# Patient Record
Sex: FEMALE | Race: AMERICAN INDIAN OR ALASKA NATIVE
[De-identification: names, ages, dates, MRNs, and addresses within clinical notes are randomized per-mention and may not be internally consistent; named-entity substitution may affect disease eponyms.]

---

## 2017-09-17 ENCOUNTER — HOSPITAL ENCOUNTER (INPATIENT)
Dept: HOSPITAL 42 - ED | Age: 37
LOS: 8 days | Discharge: HOME | DRG: 430 | End: 2017-09-25
Attending: PSYCHIATRY & NEUROLOGY | Admitting: PSYCHIATRY & NEUROLOGY
Payer: MEDICAID

## 2017-09-17 VITALS — BODY MASS INDEX: 52.9 KG/M2

## 2017-09-17 DIAGNOSIS — E66.01: ICD-10-CM

## 2017-09-17 DIAGNOSIS — I11.9: ICD-10-CM

## 2017-09-17 DIAGNOSIS — D72.820: ICD-10-CM

## 2017-09-17 DIAGNOSIS — J06.9: ICD-10-CM

## 2017-09-17 DIAGNOSIS — R82.71: ICD-10-CM

## 2017-09-17 DIAGNOSIS — I87.8: ICD-10-CM

## 2017-09-17 DIAGNOSIS — Z91.19: ICD-10-CM

## 2017-09-17 DIAGNOSIS — D64.9: ICD-10-CM

## 2017-09-17 DIAGNOSIS — F16.10: ICD-10-CM

## 2017-09-17 DIAGNOSIS — E55.9: ICD-10-CM

## 2017-09-17 DIAGNOSIS — J30.9: ICD-10-CM

## 2017-09-17 DIAGNOSIS — G47.00: ICD-10-CM

## 2017-09-17 DIAGNOSIS — J45.909: ICD-10-CM

## 2017-09-17 DIAGNOSIS — F17.200: ICD-10-CM

## 2017-09-17 DIAGNOSIS — F20.9: Primary | ICD-10-CM

## 2017-09-17 DIAGNOSIS — F31.9: ICD-10-CM

## 2017-09-17 DIAGNOSIS — F41.9: ICD-10-CM

## 2017-09-17 LAB
APPEARANCE UR: (no result)
BACTERIA #/AREA URNS HPF: (no result) /[HPF]
BILIRUB UR-MCNC: (no result) MG/DL
COLOR UR: YELLOW
GLUCOSE UR STRIP-MCNC: NEGATIVE MG/DL
KETONES UR STRIP-MCNC: (no result) MG/DL
LEUKOCYTE ESTERASE UR-ACNC: NEGATIVE LEU/UL
PH UR STRIP: 6 [PH] (ref 4.7–8)
PROT UR STRIP-MCNC: 30 MG/DL
RBC # UR STRIP: NEGATIVE /UL
RBC #/AREA URNS HPF: NEGATIVE /HPF (ref 0–2)
SP GR UR STRIP: >= 1.03 (ref 1–1.03)
UROBILINOGEN UR STRIP-ACNC: 1 E.U./DL

## 2017-09-18 LAB
ALBUMIN/GLOB SERPL: 1.1 {RATIO} (ref 1.1–1.8)
ALBUMIN/GLOB SERPL: 1.1 {RATIO} (ref 1.1–1.8)
ALP SERPL-CCNC: 81 U/L (ref 38–126)
ALP SERPL-CCNC: 94 U/L (ref 38–126)
ALT SERPL-CCNC: 23 U/L (ref 7–56)
ALT SERPL-CCNC: 31 U/L (ref 7–56)
AST SERPL-CCNC: 24 U/L (ref 14–36)
AST SERPL-CCNC: 27 U/L (ref 14–36)
BASOPHILS # BLD AUTO: 0.03 K/MM3 (ref 0–2)
BASOPHILS # BLD AUTO: 0.03 K/MM3 (ref 0–2)
BASOPHILS NFR BLD: 0.5 % (ref 0–3)
BASOPHILS NFR BLD: 0.5 % (ref 0–3)
BILIRUB SERPL-MCNC: 0.2 MG/DL (ref 0.2–1.3)
BILIRUB SERPL-MCNC: 0.2 MG/DL (ref 0.2–1.3)
BUN SERPL-MCNC: 11 MG/DL (ref 7–21)
BUN SERPL-MCNC: 9 MG/DL (ref 7–21)
CALCIUM SERPL-MCNC: 8.7 MG/DL (ref 8.4–10.5)
CALCIUM SERPL-MCNC: 9 MG/DL (ref 8.4–10.5)
CHLORIDE SERPL-SCNC: 106 MMOL/L (ref 98–107)
CHLORIDE SERPL-SCNC: 109 MMOL/L (ref 98–107)
CHOLEST SERPL-MCNC: 155 MG/DL (ref 130–200)
CO2 SERPL-SCNC: 26 MMOL/L (ref 21–33)
CO2 SERPL-SCNC: 26 MMOL/L (ref 21–33)
EOSINOPHIL # BLD: 0.3 10*3/UL (ref 0–0.7)
EOSINOPHIL # BLD: 0.3 10*3/UL (ref 0–0.7)
EOSINOPHIL NFR BLD: 4.8 % (ref 1.5–5)
EOSINOPHIL NFR BLD: 5.3 % (ref 1.5–5)
ERYTHROCYTE [DISTWIDTH] IN BLOOD BY AUTOMATED COUNT: 15 % (ref 11.5–14.5)
ERYTHROCYTE [DISTWIDTH] IN BLOOD BY AUTOMATED COUNT: 15 % (ref 11.5–14.5)
GLOBULIN SER-MCNC: 2.9 GM/DL
GLOBULIN SER-MCNC: 3.3 GM/DL
GLUCOSE SERPL-MCNC: 83 MG/DL (ref 70–110)
GLUCOSE SERPL-MCNC: 93 MG/DL (ref 70–110)
GRANULOCYTES # BLD: 2.51 10*3/UL (ref 1.4–6.5)
GRANULOCYTES # BLD: 3.25 10*3/UL (ref 1.4–6.5)
GRANULOCYTES NFR BLD: 41.4 % (ref 50–68)
GRANULOCYTES NFR BLD: 51.8 % (ref 50–68)
HCT VFR BLD CALC: 36.2 % (ref 36–48)
HCT VFR BLD CALC: 36.5 % (ref 36–48)
LYMPHOCYTES # BLD: 2.2 10*3/UL (ref 1.2–3.4)
LYMPHOCYTES # BLD: 2.8 10*3/UL (ref 1.2–3.4)
LYMPHOCYTES NFR BLD AUTO: 34.3 % (ref 22–35)
LYMPHOCYTES NFR BLD AUTO: 45.5 % (ref 22–35)
MCH RBC QN AUTO: 27.3 PG (ref 25–35)
MCH RBC QN AUTO: 27.9 PG (ref 25–35)
MCHC RBC AUTO-ENTMCNC: 32.6 G/DL (ref 31–37)
MCHC RBC AUTO-ENTMCNC: 33.4 G/DL (ref 31–37)
MCV RBC AUTO: 83.6 FL (ref 80–105)
MCV RBC AUTO: 83.7 FL (ref 80–105)
MONOCYTES # BLD AUTO: 0.4 10*3/UL (ref 0.1–0.6)
MONOCYTES # BLD AUTO: 0.5 10*3/UL (ref 0.1–0.6)
MONOCYTES NFR BLD: 7.3 % (ref 1–6)
MONOCYTES NFR BLD: 8.6 % (ref 1–6)
PLATELET # BLD: 300 10^3/UL (ref 120–450)
PLATELET # BLD: 316 10^3/UL (ref 120–450)
PMV BLD AUTO: 9.6 FL (ref 7–11)
PMV BLD AUTO: 9.8 FL (ref 7–11)
POTASSIUM SERPL-SCNC: 3.6 MMOL/L (ref 3.6–5)
POTASSIUM SERPL-SCNC: 3.8 MMOL/L (ref 3.6–5)
PROT SERPL-MCNC: 6.1 G/DL (ref 5.8–8.3)
PROT SERPL-MCNC: 6.9 G/DL (ref 5.8–8.3)
SODIUM SERPL-SCNC: 140 MMOL/L (ref 132–148)
SODIUM SERPL-SCNC: 140 MMOL/L (ref 132–148)
T4 FREE SERPL-MCNC: 0.92 NG/DL (ref 0.78–2.19)
T4 SERPL-MCNC: 5.1 UG/DL (ref 5.5–11)
TSH SERPL-ACNC: 1.61 MIU/ML (ref 0.46–4.68)
WBC # BLD AUTO: 6.1 10^3/UL (ref 4.5–11)
WBC # BLD AUTO: 6.3 10^3/UL (ref 4.5–11)

## 2017-09-18 RX ADMIN — IPRATROPIUM BROMIDE AND ALBUTEROL SULFATE SCH ML: .5; 3 SOLUTION RESPIRATORY (INHALATION) at 11:30

## 2017-09-18 RX ADMIN — FLUTICASONE PROPIONATE SCH ACTUATION: 50 SPRAY, METERED NASAL at 21:27

## 2017-09-18 RX ADMIN — FUROSEMIDE SCH MG: 40 SOLUTION ORAL at 12:07

## 2017-09-18 RX ADMIN — IPRATROPIUM BROMIDE AND ALBUTEROL SULFATE SCH ML: .5; 3 SOLUTION RESPIRATORY (INHALATION) at 14:30

## 2017-09-18 NOTE — RAD
HISTORY:

 cp 



COMPARISON:

No prior. 



FINDINGS:



LUNGS:

No active pulmonary disease.



PLEURA:

No significant pleural effusion identified, no pneumothorax apparent.



CARDIOVASCULAR:

Normal.



OSSEOUS STRUCTURES:

No significant abnormalities.



VISUALIZED UPPER ABDOMEN:

Normal.



OTHER FINDINGS:

None.



IMPRESSION:

No active disease.

## 2017-09-18 NOTE — PCM.BM
<Flavio Hogan - Last Filed: 09/18/17 04:23>





Treatment Plan Problems





- Problems identified on initial assessmt


  ** Altered Thought Process


Date Initiated: 09/18/17


Time Initiated: 04:23


Assessment reference: NA


Status: Active





  ** Medication nonadherance


Date Initiated: 09/18/17


Time Initiated: 04:23


Assessment reference: NA


Status: Active





  ** Altered Sleep Patterns


Date Initiated: 09/18/17


Time Initiated: 04:24


Assessment reference: NA


Status: Active





  ** Anxiety


Date Initiated: 09/18/17


Time Initiated: 04:24


Assessment reference: NA


Status: Active





Treatment assets and liabiliti


Patient Assests: adapts well, cooperative, ADL independent, physically healthy, 

good interpersonal skills


Patient Liabilities: live alone, poor support system, relationship conflicts





- Milieu Protocol


Maintain good personal hygiene: daily Encourage regular showers, daily Remind 

patient to perform daily oral care, daily Assist patient to perform ADL's


Maintain personal safety: every shift Educate patient to report safety concerns 

to staff, every shift Monitor environment for contraband/sharps


Medication safety: Monitor for expected outcome, potential side effects: every 

shift, Assess barriers to learning: every shift, Assess readiness for 

medication education: every shift





Discharge/Continuing Care





- Education Needs


Education Needs: Patient Medication, Patient Diagnosis/Disease Process, Patient 

Coping Skills, Patient Community resources, Patient Health Practices/Safety





- Discharge


Discharge Criteria: Tolerates medication w/o severe side effects, Free of 

paranoid thoughts, Free of agitation, Normal sleep pattern, Ability to care for 

self





<Eda Calixto - Last Filed: 09/18/17 18:05>





- Diagnosis


(1) Schizophrenia


Status: Acute   


Interventions: 





09/18/17 18:05


Psychoeducation/psychotherapy


Psychopharmacology/adjustment of medications as needed/ monitoring possible 

side effects


Evaluate pt on daily basis


Compliance with medications and follow up appointments


Long acting medication if pt is noncompliant with pill form


Suicide and homicide risk assessment and prevention, coping strategies, safety 

plan


Relapse prevention


Reduction of symptoms


Improve functional status


Possible assertive community treatment


Cognitive behavioral therapy


Family involvement


Possible social skill training as outpatient








(2) PCP (phencyclidine) abuse


Status: Acute   


Interventions: 





09/18/17 18:05


Maintaining sobriety


Relapse prevention


Possible rehabilitation


Motivational interviewing


12-step programs: AA meetings








<Kindra Mohr - Last Filed: 09/19/17 08:52>
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
## 2017-09-18 NOTE — CARD
--------------- APPROVED REPORT --------------





EKG Measurement

Heart [id number]

[id number]

[id number]

[id number]

[id number]



<Conclusion>

Normal sinus rhythm

Minimal voltage criteria for LVH, may be normal variant

Borderline ECG

## 2017-09-19 LAB
ALBUMIN/GLOB SERPL: 1.1 {RATIO} (ref 1.1–1.8)
ALP SERPL-CCNC: 95 U/L (ref 38–126)
ALT SERPL-CCNC: 28 U/L (ref 7–56)
AST SERPL-CCNC: 24 U/L (ref 14–36)
BILIRUB SERPL-MCNC: 0.2 MG/DL (ref 0.2–1.3)
BUN SERPL-MCNC: 12 MG/DL (ref 7–21)
CALCIUM SERPL-MCNC: 8.8 MG/DL (ref 8.4–10.5)
CHLORIDE SERPL-SCNC: 105 MMOL/L (ref 98–107)
CO2 SERPL-SCNC: 27 MMOL/L (ref 21–33)
GLOBULIN SER-MCNC: 3.2 GM/DL
GLUCOSE SERPL-MCNC: 86 MG/DL (ref 70–110)
POTASSIUM SERPL-SCNC: 4.2 MMOL/L (ref 3.6–5)
PROT SERPL-MCNC: 6.8 G/DL (ref 5.8–8.3)
SODIUM SERPL-SCNC: 140 MMOL/L (ref 132–148)

## 2017-09-19 RX ADMIN — FLUTICASONE PROPIONATE SCH ACTUATION: 50 SPRAY, METERED NASAL at 09:15

## 2017-09-19 RX ADMIN — IPRATROPIUM BROMIDE AND ALBUTEROL SULFATE SCH ML: .5; 3 SOLUTION RESPIRATORY (INHALATION) at 15:53

## 2017-09-19 RX ADMIN — FUROSEMIDE SCH MG: 40 SOLUTION ORAL at 09:14

## 2017-09-19 RX ADMIN — IPRATROPIUM BROMIDE AND ALBUTEROL SULFATE SCH ML: .5; 3 SOLUTION RESPIRATORY (INHALATION) at 11:10

## 2017-09-19 RX ADMIN — FLUTICASONE PROPIONATE SCH ACTUATION: 50 SPRAY, METERED NASAL at 17:40

## 2017-09-19 RX ADMIN — IPRATROPIUM BROMIDE AND ALBUTEROL SULFATE SCH ML: .5; 3 SOLUTION RESPIRATORY (INHALATION) at 11:15

## 2017-09-19 RX ADMIN — IPRATROPIUM BROMIDE AND ALBUTEROL SULFATE SCH ML: .5; 3 SOLUTION RESPIRATORY (INHALATION) at 21:41
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
## 2017-09-19 NOTE — US
HISTORY:

Leg pain and swelling. Evaluate for DVT



PHYSICIAN(S):  Segundo Butler MD.



TECHNIQUE:

Duplex sonography and color-flow Doppler with graded compression were 

used to evaluate the deep venous systems of both lower extremities. 



FINDINGS:

The visualized deep venous systems of both lower extremities are 

sonographically normal and compressible. Normal wave forms and 

augmentation are seen. There is no sonographic evidence for deep 

venous thrombosis in the visualized segments of both lower 

extremities.



IMPRESSION:

No sonographic evidence for deep venous thrombosis in the visualized 

segments of both lower extremities.

## 2017-09-19 NOTE — CON
DATE:  09/17/2017



REQUESTING PHYSICIAN:  Eda Calixto MD



HISTORY OF PRESENT ILLNESS:  The patient is a 37-year-old morbidly obese

female who came to the emergency room on September 17th.  The patient was

brought to the emergency room by [facility] for bizarre behavior. 

The patient denied any suicidal or homicidal ideation.  According to the ER

physician evaluation, the patient has past medical history of schizophrenia

and bipolar disorder, was brought to the [facility] Emergency

Room by the EMS for bizarre behavior.  The patient states that she has been

stressed and depressed.



The patient's 13-system review:  Also, complained of cough and nasal

congestion.



Code status:  Full code.



Living Will Advance Directive:  None.



ALLERGIES:  NONE.



Weight is 318 pounds.  Next BMI is 53.



HOME MEDICATIONS:  Topamax 200 mg a.m. and at bedtime, nicotine patch 7 mg

daily, Cozaar 50 mg daily, Haldol 10 mg daily, Haldol Decanoate 200 mg IM,

Cogentin 2 mg daily.



SOCIAL HISTORY:  Positive for active smoker.  Positive for alcohol use. 

Positive for substance abuse.



MENSTRUAL HISTORY:  The patient denies being pregnant.



OCCUPATIONAL HISTORY:  The patient disabled, does not work.



The patient's past medical history significant for hypertension, on Cozaar

50 mg daily; history of morbid obesity; history of nicotine dependence;

history of alcohol and substance abuse.  The patient's past medical history

is significant for insomnia, history of anxiety disorder, bipolar disorder,

schizophrenia.  Past medical history significant for alcohol use and

substance abuse.  The patient's past medical history is also significant

for multiple hospitalization to psychiatry inpatient unit in [facility].  History of psychotic behavior, history of disorganized

thoughts, history of poor hygiene, history of hypertension, history of

obesity.



The patient is seen in the day room in the psychiatry floor.  The patient

is sitting up in the bed complaining of coughing and nasal congestion.



PHYSICAL EXAMINATION:

GENERAL:  The patient is alert, awake, oriented x3.

Vital Signs:  T-max 98; heart rate 88-99; blood pressure 112/76, 134/84,

126/84; respiration 16; O2 sat 96%.

Head Examination:  Normocephalic, atraumatic.

HEENT Examination:  Shows pink conjunctivae.  Anicteric sclerae.  Decreased

oropharynx noted.  Narrow oropharynx noted.  The patient has macroglossia.

NECK:  Short and supple.

CHEST:  Kyphosis.

LUNGS:  Examination shows occasional rhonchi bilaterally.

CARDIOVASCULAR:  S1, S2, regular rhythm.

ABDOMEN:  Morbidly obese.

GENITALIA:  Female.

RECTAL:  Examination is deferred.

EXTREMITIES:  Shows nonpitting edema.  No swelling of the lower extremity.

MUSCULOSKELETAL:  Examination shows a body mass index of 53.  Cranial

nerves II-XII intact.  Ambulation is intact.  Motor strength is 5/5 in

upper and lower extremity.



DIAGNOSTICS

CBC was reviewed.  Hemoglobin/hematocrit 11.9/36.5 to 12.1/36.2.  There is

slight lymphocytosis.  Chemistry is significant for normal LFTs and normal

chemistry.  Cholesterol 155, LDL 71, HDL 58.  Vitamin D 25-hydroxy 22. 

Thyroid panel, TSH is normal.  Urinalysis shows protein 30, trace ketones,

moderate bacteria.  Drug screen is positive for phencyclidine, negative for

alcohol, negative for Tylenol, negative for salicylates.  RPR is

nonreactive.  The patient had a chest x-ray done in the emergency room

which shows no active disease.  EKG done in the emergency room shows sinus

rhythm, hypertensive cardiovascular disease noted.



The patient was seen in the emergency room by Dr. Bladimir Hayward.  The

patient was evaluated.  The patient's case was referred to PDS..  The

patient was cleared for admission to psychiatry unit.



IMPRESSION AND PLAN

1.  Acute exacerbation of schizophrenia with bizarre behavior and

psychosis.

2.  Super morbid obesity with elevated body mass index of 53.

3.  History of hypertension.

4.  Questionable sinusitis.

5.  Nicotine dependence and addiction.

6.  Post nasal drip.

7.  Possible allergic rhinitis.

8.  Mild lymphocytosis.

9.  Hypovitaminosis D.

10.  History of hypertension, proteinuria.

11.  History of substance abuse, nicotine dependence and alcohol use.

12.  Morbid obesity.

13.  Hypertensive cardiovascular disease with left ventricular hypertrophy.

14.  History of PCP marijuana use.

15.  Active nicotine addiction and dependence.

16.  Spectrum disorder, schizophrenia, possible substance abuse, psychosis,

history of PCP abuse.

17.  Bacteriuria.



PLAN:  Plan at this time; the patient has been ordered urine culture.  The

patient's current medications as per psychiatry and as per the MAR.  The

patient is on Cogentin 1 mg twice a day a.m. and at bedtime.  The patient's

Cozaar 50 mg daily has been ordered, Drisdol 50,000 weekly.  The patient is

started on DuoNeb nebulizer every 6 hours, Flonase nasal spray 1 puff each

nostril twice a day, Haldol was started at 5 mg in a.m. and at bedtime. 

The patient is started on Lasix 40 mg daily for venous stasis of the lower

extremity.  The patient is ordered nicotine patch 21 mg daily.  The patient

is ordered Sonata 5 mg at bedtime p.r.n.  The patient is started on

Tessalon Perles 200 three times a day, Topamax 50 mg a.m. and at bedtime,

Tylenol 650 q. 4 p.r.n.  The patient has been ordered RAYMOND stockings.  In

addition, the patient will be ordered venous Doppler of the lower extremity

for bilateral lower extremity nonpitting edema to rule out any underlying

pathology.  The patient's further management will be dependent upon the

patient's clinical condition, hemodynamic status and as per the patient

response with therapeutic intervention as per the patient's diagnostic test

results and as per recommendation by all the physician involved in the care

of the patient.







__________________________________________

George Davis MD





DD:  09/18/2017 20:18:37

DT:  09/18/2017 20:25:02

Job # [id number]

## 2017-09-20 LAB
ALBUMIN/GLOB SERPL: 1.1 {RATIO} (ref 1.1–1.8)
ALP SERPL-CCNC: 91 U/L (ref 38–126)
ALT SERPL-CCNC: 28 U/L (ref 7–56)
AST SERPL-CCNC: 19 U/L (ref 14–36)
BILIRUB SERPL-MCNC: 0.3 MG/DL (ref 0.2–1.3)
BUN SERPL-MCNC: 14 MG/DL (ref 7–21)
CALCIUM SERPL-MCNC: 8.9 MG/DL (ref 8.4–10.5)
CHLORIDE SERPL-SCNC: 104 MMOL/L (ref 98–107)
CO2 SERPL-SCNC: 27 MMOL/L (ref 21–33)
GLOBULIN SER-MCNC: 3.3 GM/DL
GLUCOSE SERPL-MCNC: 85 MG/DL (ref 70–110)
POTASSIUM SERPL-SCNC: 4.2 MMOL/L (ref 3.6–5)
PROT SERPL-MCNC: 7 G/DL (ref 5.8–8.3)
SODIUM SERPL-SCNC: 139 MMOL/L (ref 132–148)

## 2017-09-20 RX ADMIN — FUROSEMIDE SCH MG: 40 SOLUTION ORAL at 09:09

## 2017-09-20 RX ADMIN — FLUTICASONE PROPIONATE SCH ACTUATION: 50 SPRAY, METERED NASAL at 09:09

## 2017-09-20 RX ADMIN — IPRATROPIUM BROMIDE AND ALBUTEROL SULFATE SCH ML: .5; 3 SOLUTION RESPIRATORY (INHALATION) at 10:18

## 2017-09-20 RX ADMIN — IPRATROPIUM BROMIDE AND ALBUTEROL SULFATE SCH: .5; 3 SOLUTION RESPIRATORY (INHALATION) at 01:28

## 2017-09-20 RX ADMIN — FLUTICASONE PROPIONATE SCH ACTUATION: 50 SPRAY, METERED NASAL at 15:37

## 2017-09-20 RX ADMIN — IPRATROPIUM BROMIDE AND ALBUTEROL SULFATE SCH ML: .5; 3 SOLUTION RESPIRATORY (INHALATION) at 22:02

## 2017-09-20 RX ADMIN — IPRATROPIUM BROMIDE AND ALBUTEROL SULFATE SCH ML: .5; 3 SOLUTION RESPIRATORY (INHALATION) at 15:56
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
## 2017-09-20 NOTE — PN
DATE:  09/19/2017



SUBJECTIVE:  The patient is a 37-year-old morbidly obese 

female who was seen and examined in room 518, bed 1.  The patient is out of

bed to chair and bed bathroom privileges.  Overnight nurses notes were

reviewed.  According to the patient's nurses notes, no adverse events were

documented.  The patient states that she slept well.  The patient denies

any suicidal, homicidal ideation.  Reports decreased anxiety and decreased

depression.  The patient also reports decreased coughing, decreased

congestion since the patient was started on bronchodilators, and

expectorant.  The patient's states significant improvement in her symptoms.

The patient appeared very cheerful today.  The patient has been found

ambulating in the room.



OBJECTIVE:

VITAL SIGNS:  T-max afebrile.  Heart rate 84, blood pressure 120/90 and O2

sat is in mid to high 90s.

HEAD:  Normocephalic and atraumatic.

HEENT:  Shows pink conjunctivae.  Anicteric sclerae.  No oropharyngeal

lesion.

NECK:  Short and supple.

CHEST:  Kyphosis.  No wheezing noted.  Decreasing rhonchi noted.

CARDIOVASCULAR:  S1 and S2, regular rhythm.

ABDOMEN:  Morbidly obese.

GENITALIA:  Female.

RECTAL:  Deferred.

EXTREMITIES:  Shows nonpitting edema of the lower extremity.  No calf

tenderness noted.

MUSCULOSKELETAL:  Shows elevated body mass index.  Gait examination is

independent.

PSYCHIATRIC:  As per psychiatrist progress notes.



LABORATORY DATA:  The patient's venous Doppler of the lower extremities

were done which are negative for any DVT.  As per the preliminary report.



The patient's lab data and chemistries were reviewed.  The patient's

imaging studies, EKG were reviewed..  All of the above details the

patient's diagnostic details and therapeutic intervention from medical

perspective was explained to the patient in layman's language which she

acknowledged understood.



IMPRESSION:

1.  Acute exacerbation of anxiety and depression.

2.  Acute exacerbation of schizophrenia with psychosis.

3.  Nicotine addiction and dependence.

4.  Polysubstance abuse and dependence.

5.  Morbid obesity.

6.  Asthmatic bronchitis.

7.  Postnasal drip.

8.  Questionable and possible clinical sinusitis.

9.  Active nicotine addiction.

10.  Hypertension.

11.  Bilateral lower extremity venous stasis.

12.  Hypovitaminosis D.



PLAN:  At this time, the patient will be continued on the medications as

per the MAR.  The patient's further management will be dependent upon the

patient's clinical condition, hemodynamic status and as per the patient

response to therapeutic intervention.  The patient has been counseled about

cessation of smoking and counseled against the use of recreational drug

use.  The patient is also advised weight loss.  The patient has been

advised compliance with recommendation from psychiatrist.  The patient has

been advised cessation of smoking.  The patient has also advised outpatient

to follow up with the medical physician.  The patient states that she has

been following up with _____.  At present, the patient will be continued on

the therapeutic intervention as per the MAR, which was reviewed.  This

morning the patient's case was discussed with the psychiatrist.  I have

informed the psychiatrist that from medical perspective, the patient is

stable and can be managed as an outpatient with continuation of the

medications, which are instituted and ordered in the hospital.  If the

patient wishes to followup upon discharge with me, I have advised the

patient to contact the office to make an appointment for verification of

acceptance of the patient's insurance.  The patient was extensively

explained about the details of her medical condition, diagnosis, diagnostic

test results and therapeutic intervention.  Planning was discussed and

explained to the patient, which she acknowledged and understand.  All

questions concerned answered.



Dictated and electronically signed, not read.





__________________________________________

George Davis MD





DD:  09/19/2017 17:35:46

DT:  09/19/2017 17:41:50

Job # [id number]

## 2017-09-21 LAB
ALBUMIN/GLOB SERPL: 1.1 {RATIO} (ref 1.1–1.8)
ALP SERPL-CCNC: 84 U/L (ref 38–126)
ALT SERPL-CCNC: 28 U/L (ref 7–56)
AST SERPL-CCNC: 19 U/L (ref 14–36)
BILIRUB SERPL-MCNC: 0.2 MG/DL (ref 0.2–1.3)
BUN SERPL-MCNC: 12 MG/DL (ref 7–21)
CALCIUM SERPL-MCNC: 8.7 MG/DL (ref 8.4–10.5)
CHLORIDE SERPL-SCNC: 105 MMOL/L (ref 98–107)
CO2 SERPL-SCNC: 26 MMOL/L (ref 21–33)
GLOBULIN SER-MCNC: 3.1 GM/DL
GLUCOSE SERPL-MCNC: 86 MG/DL (ref 70–110)
POTASSIUM SERPL-SCNC: 4.1 MMOL/L (ref 3.6–5)
PROT SERPL-MCNC: 6.5 G/DL (ref 5.8–8.3)
SODIUM SERPL-SCNC: 139 MMOL/L (ref 132–148)

## 2017-09-21 RX ADMIN — IPRATROPIUM BROMIDE AND ALBUTEROL SULFATE SCH ML: .5; 3 SOLUTION RESPIRATORY (INHALATION) at 20:59

## 2017-09-21 RX ADMIN — FLUTICASONE PROPIONATE SCH ACTUATION: 50 SPRAY, METERED NASAL at 09:24

## 2017-09-21 RX ADMIN — FLUTICASONE PROPIONATE SCH ACTUATION: 50 SPRAY, METERED NASAL at 17:00

## 2017-09-21 RX ADMIN — IPRATROPIUM BROMIDE AND ALBUTEROL SULFATE SCH ML: .5; 3 SOLUTION RESPIRATORY (INHALATION) at 14:22

## 2017-09-21 RX ADMIN — FUROSEMIDE SCH MG: 40 SOLUTION ORAL at 09:08

## 2017-09-21 RX ADMIN — IPRATROPIUM BROMIDE AND ALBUTEROL SULFATE SCH ML: .5; 3 SOLUTION RESPIRATORY (INHALATION) at 09:03

## 2017-09-21 RX ADMIN — IPRATROPIUM BROMIDE AND ALBUTEROL SULFATE SCH: .5; 3 SOLUTION RESPIRATORY (INHALATION) at 01:16
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
## 2017-09-21 NOTE — PN
DATE:  09/20/2017



SUBJECTIVE:  The patient is seen in room 518 bed 1.  The patient is out of

bed-to-chair.  The patient is ambulating.  The patient is a 37-year-old

 female who states that she is feeling extremely well since

the time of arrival.  The patient is seen on her room and also the patient

was examined privately in a dinning room on the floor in front of the

nursing station on psychiatry floor.  The patient's overnight nurse's notes

were reviewed.  No adverse events documented.  The patient denies any

anxiety, depression.  Denies any suicidal, homicidal ideation at present.



PHYSICAL EXAMINATION:

VITAL SIGNS:  T-max 98.5, pulse 86-92, blood pressure 127/83.

HEAD:  Normocephalic, atraumatic.

HEENT:  Shows pink conjunctivae.  Anicteric sclerae.  No oropharyngeal

lesion.  No neck rigidity.

CHEST:  Kyphosis.

LUNG:  Shows no audible wheezing.  No audible rhonchi.  No crackles, no

rales, no wheezing.

CARDIOVASCULAR:  S1, S2, regular rhythm.

ABDOMEN:  Morbidly obese.

GENITALIA:  Female.

RECTAL:  Deferred.

EXTREMITY:  Shows positive chronic nonpitting swelling of the lower

extremity.

MUSCULOSKELETAL:  Shows an elevated body mass index and obesity.

NEUROLOGIC:  The patient is alert, awake, oriented x3.  Cranial nerves

II-XII intact.  Gait examination is independent.

VASCULAR:  Palpable pulses.  Plantars are downward.  DTR is plus.



DIAGNOSTICS:  Sodium 139, potassium 4.2, chloride 104, CO2 of 27, BUN 14,

creatinine 0.8.  LFTs are within normal limits.  Glucose 85.



The patient's psychiatric evaluation was noted.



IMPRESSION:

1.  Acute exacerbation of schizophrenia.

2.  Acute exacerbation of anxiety, depression.

3.  Questionable psychosis.

4.  Morbid obesity.

5.  History of hypertension.

6.  Active nicotine, alcohol and polysubstance abuse and dependence.

7.  History of poor compliance.

8.  Sinusitis, rhinitis and rhinosinusitis.

9.  Possible acute bronchitis.

10.  Possible chronic obstructive pulmonary disease with nicotine

dependence.

11.  Bilateral lower extremity venous stasis.

12.  Obesity.

13.  Hypovitaminosis D



PLAN:  At this time, the patient is to be treated and continued on

medications as per MAR and the patient has to be actively and aggressively

managed as per psychiatrist's recommendation and psychiatry floor treatment

plan.  The patient will be continued on all the medications as per the MAR

which was reviewed.  The patient's electrolytes are stable while the

patient is taking diuretics and antihypertensive.



Dictated and electronically signed, not read.







__________________________________________

George Davis MD





DD:  09/20/2017 21:43:19

DT:  09/20/2017 21:49:44

Job # [id number]

## 2017-09-21 NOTE — PN
DATE:  09/21/2017



SUBJECTIVE:  The patient is seen in the patient's room.  The patient is

being participating in the group therapy.  The patient is ambulating

without assistance.  The patient denies anxiety.  Denies depression. 

Denies suicidal, homicidal ideation.  Denies any chest pain.  Denies any

cough.  The patient states that her cough and congestion and breathing

issue have resolved since the day of admission.  The patient also feels

that the nicotine patch is helping her significantly.  Overnight nurse's

notes were reviewed.  No adverse events documented.



OBJECTIVE:

VITAL SIGNS:  T-max is 98.8, heart rate is 78, 84, 90, blood pressure is

ranging between 120-130 systolic to diastolic in 70s and 80s.  O2 sat is

mid to high 90s.

HEENT:  Head examination is normocephalic, atraumatic.  HEENT examination

shows pink conjunctivae.  Anicteric sclerae.  No oropharyngeal lesion.  No

neck rigidity.

CHEST:  Examination symmetrical.

LUNGS:  Examination shows no rales, no crackles, no wheezing, no rhonchi

today anterior posteriorly.

CARDIOVASCULAR:  Shows S1, S2, regular rhythm.

ABDOMEN:  Morbidly obese.

GENITALIA:  Female.

RECTAL:  Examination is deferred.

EXTREMITIES:  Show chronic nonpitting swelling of the lower extremity.  No

calf tenderness.  No Homans' sign.

MUSCULOSKELETAL:  Examination shows an elevated body mass index which is

consistent with obesity.  Gait examination is independent.

NEUROLOGIC:  The patient is alert, awake, oriented x3.  Cranial nerves II

through XII grossly intact.  Gait examination is independent.  Motor

strength is 5/5 in upper and lower extremity.

PSYCHIATRIC:  Examination is as per the psychiatrist's note.



DIAGNOSTICS:  Chemistries and CMP were reviewed which were within normal

limit.



IMPRESSION AND PLAN

1.  Acute exacerbation of anxiety, depression.

2.  Acute exacerbation of schizophrenia.

3.  Smoker's bronchitis with possible underlying emphysema.

4.  Questionable rhinosinusitis.

5.  Postnasal drip.

6.  Nicotine, polysubstance abuse, and alcohol dependence and abuse.

7.  Morbid obesity.

8.  Hypertension.

9.  Hypovitaminosis D.

10.  History of poor compliance.



PLAN:  At this time, the patient's medications will be continued as per the

MAR, which is reviewed.  At present, the patient is otherwise medically

stable from our perspective.  The patient was advised to follow up medical

physician and psychiatrist upon discharge and continue medication as

prescribed which the patient acknowledged understood.







__________________________________________

George Davis MD





DD:  09/21/2017 18:08:06

DT:  09/21/2017 18:12:38

Job # [id number]

## 2017-09-22 LAB
ALBUMIN/GLOB SERPL: 1.1 {RATIO} (ref 1.1–1.8)
ALP SERPL-CCNC: 78 U/L (ref 38–126)
ALT SERPL-CCNC: 22 U/L (ref 7–56)
AST SERPL-CCNC: 19 U/L (ref 14–36)
BILIRUB SERPL-MCNC: 0.3 MG/DL (ref 0.2–1.3)
BUN SERPL-MCNC: 16 MG/DL (ref 7–21)
CALCIUM SERPL-MCNC: 8.8 MG/DL (ref 8.4–10.5)
CHLORIDE SERPL-SCNC: 105 MMOL/L (ref 98–107)
CO2 SERPL-SCNC: 25 MMOL/L (ref 21–33)
GLOBULIN SER-MCNC: 3.2 GM/DL
GLUCOSE SERPL-MCNC: 85 MG/DL (ref 70–110)
POTASSIUM SERPL-SCNC: 4.5 MMOL/L (ref 3.6–5)
PROT SERPL-MCNC: 6.6 G/DL (ref 5.8–8.3)
SODIUM SERPL-SCNC: 138 MMOL/L (ref 132–148)

## 2017-09-22 RX ADMIN — IPRATROPIUM BROMIDE AND ALBUTEROL SULFATE SCH: .5; 3 SOLUTION RESPIRATORY (INHALATION) at 01:09

## 2017-09-22 RX ADMIN — IPRATROPIUM BROMIDE AND ALBUTEROL SULFATE SCH ML: .5; 3 SOLUTION RESPIRATORY (INHALATION) at 08:47

## 2017-09-22 RX ADMIN — FLUTICASONE PROPIONATE SCH ACTUATION: 50 SPRAY, METERED NASAL at 17:28

## 2017-09-22 RX ADMIN — IPRATROPIUM BROMIDE AND ALBUTEROL SULFATE SCH ML: .5; 3 SOLUTION RESPIRATORY (INHALATION) at 14:19

## 2017-09-22 RX ADMIN — IPRATROPIUM BROMIDE AND ALBUTEROL SULFATE SCH ML: .5; 3 SOLUTION RESPIRATORY (INHALATION) at 21:20

## 2017-09-22 RX ADMIN — FLUTICASONE PROPIONATE SCH ACTUATION: 50 SPRAY, METERED NASAL at 09:05

## 2017-09-22 RX ADMIN — FUROSEMIDE SCH MG: 40 SOLUTION ORAL at 09:05

## 2017-09-22 NOTE — PN
LOCATION:  The patient is in the behavioral care unit, [facility] in [place].



SUBJECTIVE:  The patient is admitted to the [facility],

Behavioral care unit for addiction disorder and also depression, psychosis

and the patient had medical condition hypertension, chronic lung disease,

cough, and headache.



PHYSICAL EXAMINATION:

VITAL SIGNS:  This morning, the pulse is 90, blood pressure 112/73,

respirations are 20, O2 sat is 99% on room air.

HEENT:  The patient's head is normocephalic.

LUNGS:  Clear.

HEART:  Normal sinus rhythm.

ABDOMEN:  Soft.  Liver and spleen not palpable.

CENTRAL NERVOUS SYSTEM:  No focal deficits are noted.



MEDICATIONS:  The patient's list of medication consists of Cogentin,

losartan, vitamin D, albuterol, Flonase, Haldol, Lasix, and nicotine patch

for nicotine withdrawal.  The patient is a smoker.  The patient is on

Sonata 5 mg at bedtime for sleep.



Diet is heart-healthy diet.  The patient will continue the current

management.  The patient's clinical condition is stable.







__________________________________________

Radha Baum MD





DD:  09/22/2017 8:48:38

DT:  09/22/2017 10:44:40

Job # [id number]
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
## 2017-09-23 LAB
ALBUMIN/GLOB SERPL: 1.1 {RATIO} (ref 1.1–1.8)
ALP SERPL-CCNC: 82 U/L (ref 38–126)
ALT SERPL-CCNC: 27 U/L (ref 7–56)
AST SERPL-CCNC: 19 U/L (ref 14–36)
BILIRUB SERPL-MCNC: 0.2 MG/DL (ref 0.2–1.3)
BUN SERPL-MCNC: 16 MG/DL (ref 7–21)
CALCIUM SERPL-MCNC: 8.6 MG/DL (ref 8.4–10.5)
CHLORIDE SERPL-SCNC: 105 MMOL/L (ref 98–107)
CO2 SERPL-SCNC: 26 MMOL/L (ref 21–33)
GLOBULIN SER-MCNC: 3.1 GM/DL
GLUCOSE SERPL-MCNC: 83 MG/DL (ref 70–110)
POTASSIUM SERPL-SCNC: 4.4 MMOL/L (ref 3.6–5)
PROT SERPL-MCNC: 6.5 G/DL (ref 5.8–8.3)
SODIUM SERPL-SCNC: 139 MMOL/L (ref 132–148)

## 2017-09-23 RX ADMIN — FUROSEMIDE SCH MG: 40 SOLUTION ORAL at 09:25

## 2017-09-23 RX ADMIN — IPRATROPIUM BROMIDE AND ALBUTEROL SULFATE SCH: .5; 3 SOLUTION RESPIRATORY (INHALATION) at 02:00

## 2017-09-23 RX ADMIN — IPRATROPIUM BROMIDE AND ALBUTEROL SULFATE SCH ML: .5; 3 SOLUTION RESPIRATORY (INHALATION) at 08:32

## 2017-09-23 RX ADMIN — FLUTICASONE PROPIONATE SCH ACTUATION: 50 SPRAY, METERED NASAL at 17:33

## 2017-09-23 RX ADMIN — IPRATROPIUM BROMIDE AND ALBUTEROL SULFATE SCH ML: .5; 3 SOLUTION RESPIRATORY (INHALATION) at 14:39

## 2017-09-23 RX ADMIN — IPRATROPIUM BROMIDE AND ALBUTEROL SULFATE SCH: .5; 3 SOLUTION RESPIRATORY (INHALATION) at 22:58

## 2017-09-23 RX ADMIN — FLUTICASONE PROPIONATE SCH ACTUATION: 50 SPRAY, METERED NASAL at 09:28

## 2017-09-23 NOTE — PCM.PYCHPN
Psychiatric Progress Note





- Psychiatric Progress Note


Patient seen today, length of contact: 25 min


Patient Chief Complaint: 


"doing okay"


Problems Identified/Issues Discussed: 


I reviewed recent notes and met with patient at bedside. Patient is 

superficially pleasant and cooperative. Grooming is fair and patient is 

oriented to location, month, year and circumstances. Reports that she is 

improving "doing okay" and slept well last night. Affect is a little labile 

however she is in good control. Denies AVH, SI or HI. Patient denies any new 

discomfort or pain. She has been tolerating medications. 


Nursing notes indicate that patient has been cheerful and bright with rapid 

speech. She has been visible and attending groups. There were no behavioral 

issues overnight. 











Diagnostic Results: 


Schizophrenia as per h/o


PCP abuse


r/o substance induced psychosis, better








Medication Change: No


Medical Record Reviewed: Yes





Mental Status Examination





- Cognitive Function


Orientation: Person, Place





- Mood


Mood: Anxious, Other ("doing okay")





- Affect


Affect: Flat





- Speech


Speech: Appropriate





- Formal Thought Process


Formal Thought Process: Hallucinations (denied), Delusions, Paranoia, 

Circumstantial





- Homicidal Ideation


Homicidal Ideation: No





Goal/Treatment Plan





- Goal/Treatment Plan


Need for Continued Stay: Remain at risks for inpatient hospitalization, Severe 

depression anxiety, Severe functional impairment


Progress Toward Problem(s) and Goals/Treatment Plan: 





* c/w current tx and plan


* Vitals reviewed and noted below:


 Selected Entries











  09/22/17 09/22/17 09/22/17





  06:51 09:05 16:00


 


Temperature 98.2 F  


 


Pulse Rate 90  94 H


 


Respiratory 20  





Rate   


 


Blood Pressure 112/73 112/73 109/61


 


O2 Sat by Pulse 99  





Oximetry   





* New weekend labs noted below:





 Laboratory Results - last 24 hr











  09/22/17 09/23/17





  08:18 07:20


 


Sodium  138  139


 


Potassium  4.5  4.4


 


Chloride  105  105


 


Carbon Dioxide  25  26


 


Anion Gap  13  12


 


BUN  16  16


 


Creatinine  0.9  0.9


 


Est GFR ( Amer)  > 60  > 60


 


Est GFR (Non-Af Amer)  > 60  > 60


 


Random Glucose  85  83


 


Calcium  8.8  8.6


 


Total Bilirubin  0.3  0.2


 


AST  19  19


 


ALT  22  27


 


Alkaline Phosphatase  78  82


 


Total Protein  6.6  6.5


 


Albumin  3.4  3.4


 


Globulin  3.2  3.1


 


Albumin/Globulin Ratio  1.1  1.1











Estimated Date of D/C: 09/25/17

## 2017-09-24 VITALS — OXYGEN SATURATION: 98 % | RESPIRATION RATE: 20 BRPM

## 2017-09-24 RX ADMIN — FUROSEMIDE SCH MG: 40 SOLUTION ORAL at 09:20

## 2017-09-24 RX ADMIN — FLUTICASONE PROPIONATE SCH ACTUATION: 50 SPRAY, METERED NASAL at 17:40

## 2017-09-24 RX ADMIN — IPRATROPIUM BROMIDE AND ALBUTEROL SULFATE SCH ML: .5; 3 SOLUTION RESPIRATORY (INHALATION) at 13:32

## 2017-09-24 RX ADMIN — IPRATROPIUM BROMIDE AND ALBUTEROL SULFATE SCH ML: .5; 3 SOLUTION RESPIRATORY (INHALATION) at 21:58

## 2017-09-24 RX ADMIN — IPRATROPIUM BROMIDE AND ALBUTEROL SULFATE SCH ML: .5; 3 SOLUTION RESPIRATORY (INHALATION) at 08:14

## 2017-09-24 RX ADMIN — FLUTICASONE PROPIONATE SCH ACTUATION: 50 SPRAY, METERED NASAL at 09:18

## 2017-09-24 RX ADMIN — IPRATROPIUM BROMIDE AND ALBUTEROL SULFATE SCH: .5; 3 SOLUTION RESPIRATORY (INHALATION) at 01:35

## 2017-09-24 NOTE — PN
DATE:  09/24/2017



LOCATION:  The patient is seen in room 518 bed 1.



SUBJECTIVE:  The patient has been ambulating out of bed to chair.  The

patient is alert, awake, oriented x3.  The patient denies any suicidal,

homicidal ideation.  Denies any auditory hallucinations.  Denies any chest

pain.  Denies coughing, denies congestion.  Denies fever, denies chills. 

Denies nausea, denies vomiting.



PHYSICAL EXAMINATION:

VITAL SIGNS:  In the last 24 hours were reviewed.  The patient's T-max is

98.4, heart rate 84 to 92 to 96, blood pressure 123/81, 112/63, 102/61,

112/73, respirations 20, O2 sat 98%.

HEAD:  Normocephalic, atraumatic.

EENT:  Shows pink conjunctivae.  Anicteric sclerae.  No oropharyngeal

lesion.

NECK:  No neck rigidity.

CHEST:  Kyphosis.

LUNGS:  Shows no rhonchi.  No wheezing, no crackles, no rales.

CARDIOVASCULAR:  S1, S2, regular rhythm.  Questionable soft systolic

murmur, left sternal border, right second intercostal space.

ABDOMEN:  Obese.  Positive bowel sounds.

GENITALIA:  Female.

RECTAL:  Deferred.

EXTREMITIES:  Shows nonpitting swelling of the lower extremities.  No calf

tenderness.  No Homans' sign.

NEUROLOGIC:  The patient is alert, awake and oriented x3.  Ambulating

independently.

MUSCULOSKELETAL:  Shows a body mass index of 50.



DIAGNOSTICS:  CBC none.  Chemistry from 09/23/2017, sodium 139, potassium

4.4, chloride 105, CO2 of 26, anion gap 12, BUN 16, creatinine 0.9, GFR has

been steady with greater than 60, glucose 83, calcium 8.6.  LFTs are

normal.  RPR is nonreactive.  Venous Doppler of the lower extremity was

negative.



IMPRESSION:

1.  Acute exacerbation of anxiety, depression.

2.  History of longstanding multiple illness and multiple inpatient

psychiatric hospitalizations.

3.  History of nicotine, alcohol and substance abuse.

4.  Psychotic behavior with disorganized thought process.

5.  Morbid obesity.

6.  History of hypertension.

7.  History of schizophrenia.

8.  History of PCP use.

9.  Bilateral lower extremity, nonpitting venous stasis.

10.  Normocytic anemia.

11.  Hypovitaminosis D.

12.  Proteinuria.

13.  Trace bacteriuria, asymptomatic.



CURRENT MEDICATIONS:

1.  Cogentin 1 mg a.m. and at bedtime.

2.  Cozaar 50 mg daily.

3.  Drisdol 50,000 units weekly.

4.  DuoNeb nebulizer every 6 hours.

5.  Flonase 1 puff each nostril twice a day.

6.  Haldol 5 mg a.m. at bedtime and Haldol 5 mg at 4:00 p.m.

7.  Lasix 40 mg daily.

8.  Nicotine patch 21 mg daily.

9.  Sonata 5 mg at bedtime p.r.n.

10.  Tessalon Perles 200 three times a day.

11.  Topamax 50 mg a.m. and at bedtime.

12.  The patient is being given Zithromax 500 mg p.o. daily by Dr. Park.



PLAN:  At this time, the patient has been seen by psychiatrist.  The

patient's echocardiogram report is pending.  The patient was seen by Dr. Park on Friday on 09/22/2017.  Dr. Park has been covering me on

09/22/2017 and 09/23/2017.





__________________________________________

George Davis MD





DD:  09/24/2017 14:52:31

DT:  09/24/2017 14:59:15

Job # [id number]

## 2017-09-24 NOTE — PCM.PYCHPN
Psychiatric Progress Note





- Psychiatric Progress Note


Patient seen today, length of contact: 25 min


Patient Chief Complaint: 


"everything is good"


Problems Identified/Issues Discussed: 


 I reviewed recent notes and met with patient at bedside. Patient is 

superficially pleasant and cooperative. Grooming is fair and patient is 

oriented to location, month, year and circumstances. Reports that "everything 

is good" and slept well again last night. Affect is a little labile however she 

is in good control and pleasant. Denies AVH, SI or HI. Complains of a little 

cough otherwise she denies any new discomfort, pain or side effects. 


Nursing notes indicate that patient has generally been friendly and bright. 

Demonstrates rapid speech. She has been visible and attending groups. There 

were no behavioral issues over the weekend. 

















Diagnostic Results: 


Schizophrenia as per h/o


PCP abuse


r/o substance induced psychosis, better








Medication Change: No


Medical Record Reviewed: Yes





Mental Status Examination





- Cognitive Function


Orientation: Person, Place





- Mood


Mood: Anxious ("everything is good"), Other ( )





- Affect


Affect: Other (a little labile)





- Speech


Speech: Appropriate





- Formal Thought Process


Formal Thought Process: Hallucinations (denied all weekend), Delusions, Paranoia

, Circumstantial





- Suicidal Ideation


Suicidal Ideation: No





- Homicidal Ideation


Homicidal Ideation: No





Goal/Treatment Plan





- Goal/Treatment Plan


Need for Continued Stay: Remain at risks for inpatient hospitalization, Severe 

depression anxiety, Severe functional impairment


Progress Toward Problem(s) and Goals/Treatment Plan: 





* c/w current tx and plan


* Vitals reviewed and noted below:


 Selected Entries











  09/23/17 09/23/17 09/23/17





  07:10 09:25 09:27


 


Temperature 98.4 F  


 


Pulse Rate 84  84


 


Respiratory 18  





Rate   


 


Blood Pressure 102/61 102/61 102/61














  09/23/17





  16:28


 


Temperature 


 


Pulse Rate 84


 


Respiratory 





Rate 


 


Blood Pressure 112/73








 


* New weekend labs noted below:





 Laboratory Results - last 24 hr











  09/22/17 09/23/17





  08:18 07:20


 


Sodium  138  139


 


Potassium  4.5  4.4


 


Chloride  105  105


 


Carbon Dioxide  25  26


 


Anion Gap  13  12


 


BUN  16  16


 


Creatinine  0.9  0.9


 


Est GFR ( Amer)  > 60  > 60


 


Est GFR (Non-Af Amer)  > 60  > 60


 


Random Glucose  85  83


 


Calcium  8.8  8.6


 


Total Bilirubin  0.3  0.2


 


AST  19  19


 


ALT  22  27


 


Alkaline Phosphatase  78  82


 


Total Protein  6.6  6.5


 


Albumin  3.4  3.4


 


Globulin  3.2  3.1


 


Albumin/Globulin Ratio  1.1  1.1











Estimated Date of D/C: 09/25/17

## 2017-09-25 VITALS — DIASTOLIC BLOOD PRESSURE: 67 MMHG | SYSTOLIC BLOOD PRESSURE: 117 MMHG

## 2017-09-25 VITALS — HEART RATE: 91 BPM | TEMPERATURE: 98.7 F

## 2017-09-25 RX ADMIN — FUROSEMIDE SCH MG: 40 SOLUTION ORAL at 09:49

## 2017-09-25 RX ADMIN — IPRATROPIUM BROMIDE AND ALBUTEROL SULFATE SCH ML: .5; 3 SOLUTION RESPIRATORY (INHALATION) at 14:05

## 2017-09-25 RX ADMIN — FLUTICASONE PROPIONATE SCH ACTUATION: 50 SPRAY, METERED NASAL at 09:49

## 2017-09-25 RX ADMIN — IPRATROPIUM BROMIDE AND ALBUTEROL SULFATE SCH: .5; 3 SOLUTION RESPIRATORY (INHALATION) at 01:56

## 2017-09-25 RX ADMIN — IPRATROPIUM BROMIDE AND ALBUTEROL SULFATE SCH ML: .5; 3 SOLUTION RESPIRATORY (INHALATION) at 10:28
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
## 2017-09-25 NOTE — PCM.BM
Treatment Plan Problems





- Problems identified on initial assessmt


  ** Altered Thought Process


Date Initiated: 09/18/17


Time Initiated: 04:23


Date resolved: 09/25/17


Assessment reference: NA


Status: Active





  ** Medication nonadherance


Date Initiated: 09/18/17


Time Initiated: 04:23


Date resolved: 09/25/17


Assessment reference: NA


Status: Active





  ** Altered Sleep Patterns


Date Initiated: 09/18/17


Time Initiated: 04:24


Date resolved: 09/25/17


Assessment reference: NA


Status: Active





  ** Anxiety


Date Initiated: 09/18/17


Time Initiated: 04:24


Date resolved: 09/25/17


Assessment reference: NA


Status: Active





Treatment assets and liabiliti


Patient Assests: adapts well, cooperative, ADL independent, physically healthy, 

good interpersonal skills


Patient Liabilities: live alone, poor support system, relationship conflicts





- Milieu Protocol


Maintain good personal hygiene: daily Encourage regular showers, daily Remind 

patient to perform daily oral care, daily Assist patient to perform ADL's


Maintain personal safety: every shift Educate patient to report safety concerns 

to staff, every shift Monitor environment for contraband/sharps


Medication safety: Monitor for expected outcome, potential side effects: every 

shift, Assess barriers to learning: every shift, Assess readiness for 

medication education: every shift


Milieu Narrative: 





* c/w current tx and plan


* Vitals reviewed and noted below:


 Selected Entries











  09/23/17 09/23/17 09/23/17





  07:10 09:25 09:27


 


Temperature 98.4 F  


 


Pulse Rate 84  84


 


Respiratory 18  





Rate   


 


Blood Pressure 102/61 102/61 102/61














  09/23/17





  16:28


 


Temperature 


 


Pulse Rate 84


 


Respiratory 





Rate 


 


Blood Pressure 112/73








 


* New weekend labs noted below:





 Laboratory Results - last 24 hr











  09/22/17 09/23/17





  08:18 07:20


 


Sodium  138  139


 


Potassium  4.5  4.4


 


Chloride  105  105


 


Carbon Dioxide  25  26


 


Anion Gap  13  12


 


BUN  16  16


 


Creatinine  0.9  0.9


 


Est GFR ( Amer)  > 60  > 60


 


Est GFR (Non-Af Amer)  > 60  > 60


 


Random Glucose  85  83


 


Calcium  8.8  8.6


 


Total Bilirubin  0.3  0.2


 


AST  19  19


 


ALT  22  27


 


Alkaline Phosphatase  78  82


 


Total Protein  6.6  6.5


 


Albumin  3.4  3.4


 


Globulin  3.2  3.1


 


Albumin/Globulin Ratio  1.1  1.1














Family Contact


Family involvement: Family/SO is involved





Discharge/Continuing Care





- Education Needs


Education Needs: Patient Medication, Patient Diagnosis/Disease Process, Patient 

Coping Skills, Patient Community resources, Patient Health Practices/Safety





- Discharge


Discharge Criteria: Tolerates medication w/o severe side effects, Free of 

paranoid thoughts, Free of agitation, Normal sleep pattern, Ability to care for 

self





- Treatment Team Participation


Patient/Family/SO Statement: 





* c/w current tx and plan


* Vitals reviewed and noted below:


 Selected Entries











  09/23/17 09/23/17 09/23/17





  07:10 09:25 09:27


 


Temperature 98.4 F  


 


Pulse Rate 84  84


 


Respiratory 18  





Rate   


 


Blood Pressure 102/61 102/61 102/61














  09/23/17





  16:28


 


Temperature 


 


Pulse Rate 84


 


Respiratory 





Rate 


 


Blood Pressure 112/73








 


* New weekend labs noted below:





 Laboratory Results - last 24 hr











  09/22/17 09/23/17





  08:18 07:20


 


Sodium  138  139


 


Potassium  4.5  4.4


 


Chloride  105  105


 


Carbon Dioxide  25  26


 


Anion Gap  13  12


 


BUN  16  16


 


Creatinine  0.9  0.9


 


Est GFR ( Amer)  > 60  > 60


 


Est GFR (Non-Af Amer)  > 60  > 60


 


Random Glucose  85  83


 


Calcium  8.8  8.6


 


Total Bilirubin  0.3  0.2


 


AST  19  19


 


ALT  22  27


 


Alkaline Phosphatase  78  82


 


Total Protein  6.6  6.5


 


Albumin  3.4  3.4


 


Globulin  3.2  3.1


 


Albumin/Globulin Ratio  1.1  1.1














Treatment Plan Review





- Problem


  ** Altered Thought Process


Time Initiated: 04:23





  ** Medication nonadherance


Time Initiated: 04:23





  ** Altered Sleep Patterns


Time Initiated: 04:24





  ** Anxiety


Time Initiated: 04:24

## 2017-09-26 NOTE — CARD
--------------- APPROVED REPORT --------------





EXAM: Two-dimensional and M-mode echocardiogram with Doppler and 

color Doppler.



Other Information 

Quality : AverageRhythm : 



INDICATION

Hypertension/HCVD



2D DIMENSIONS 

Left Atrium (2D)4.0   (1.6-4.0cm)IVSd1.1   (0.7-1.1cm)

LVDd5.0   (3.9-5.9cm)PWd1.2   (0.7-1.1cm)

LVDs3.5   (2.5-4.0cm)FS (%) 30.0   %

LVEF (%)57.0   (>50%)



M-Mode DIMENSIONS 

Aortic Root3.40   (2.2-3.7cm)Aortic Cusp Exc.2.00   (1.5-2.0cm)



Aortic Valve

AoV Peak Qybwtdif532.0cm/s



Mitral Valve

MV E Uqwcjthb62.6cm/sMV A Cesrchzi35.6cm/sE/A ratio0.9



TDI

Lateral E' Peak V9.36cm/sMedial E' Peak V6.53cm/sE/Lateral E'7.2

E/Medial E'10.4



Pulmonary Valve

PV Peak Ovfgipbv52.3cm/sPV Peak Grad.1mmHg



Tricuspid Valve

TR Peak Twqsimlo840er/sRAP [id number] Peak Gr.23mmHg

[id number]



 LEFT VENTRICLE 

The left ventricle is normal size. There is normal left ventricular 

wall thickness. The left ventricular function is normal. The left 

ventricular ejection fraction is within the normal range. There is 

normal LV segmental wall motion.



 RIGHT VENTRICLE 

The right ventricle is normal size.



 ATRIA 

The left atrium size is normal. The right atrium size is normal. The 

interatrial septum is intact with no evidence for an atrial septal 

defect.



 AORTIC VALVE 

The aortic valve is normal in structure.



 MITRAL VALVE 

The mitral valve is normal in structure.



 TRICUSPID VALVE 

The tricuspid valve is normal in structure. There is trace tricuspid 

regurgitation.



 PULMONIC VALVE 

The pulmonic valve is not well visualized.



<Conclusion>

The left ventricle is normal size.

There is normal left ventricular wall thickness.

The left ventricular function is normal.

## 2017-09-26 NOTE — PN
DATE:  09/25/2017



SUBJECTIVE:  The patient is seen in the day room today.  The patient is out

of bed to chair.  The patient is alert, awake, oriented x3.  Overnight

nurse's notes were reviewed.  The patient has improved insight and

judgment.  The patient participated in the group therapy.  The patient was

seen by the  today.  The patient is for discharge today.  The

patient is to be discharged home to be picked up by the patient's mother. 

The patient is referred for appointment with psychiatrist at the adult

psychiatric outpatient program at [facility].



OBJECTIVE:

VITAL SIGNS:  T-max afebrile 98.7; heart rate 91, 96, 92, 84; blood

pressure 117/67; 123/81; respirations 20; O2 sat 98%.

GENERAL:  The patient is seen sitting up in the chair.

HEAD:  Examination normocephalic, atraumatic.

EENT:  Examination shows pinkish conjunctivae.  Anicteric sclerae.  No

oropharyngeal lesion.  No neck rigidity.

CHEST:  Examination kyphosis.

LUNGS:  Examination shows no rales, crackles or wheezing.

CARDIOVASCULAR:  S1, S2, regular rhythm.  Questionable soft systolic

murmur, right second intercostal space, left second intercostal space, left

sternal border.

ABDOMEN:  Morbidly obese.

GENITALIS:  Female.

RECTAL:  Examination is deferred.

EXTREMITIES:  Nonpitting swelling and edema of the lower extremity which is

significantly decreased since hospitalization.

MUSCULOSKELETAL:  Examination shows a body mass index of greater than 50.

NEUROLOGIC:  Cranial nerves II-XII intact.  Gait examination is

independent.

VASCULAR:  Examination palpable pulses.

PSYCHIATRIC:  Examination as per psychiatrist evaluation.



DIAGNOSTIC DATA:  From 09/25/2017, none.



The patient was evaluated by the psychiatrist, Dr. Calixto.





IMPRESSION AND PLAN

1.  Acute exacerbation of schizophrenia with psychosis and disorganized and

psychotic behavior.

2.  History of noncompliance.

3.  History of multiple inpatient psychiatric hospitalization and

treatment.

4.  History of nicotine, alcohol and PCP abuse.

5.  Schizophrenia.

6.  PCP phencyclidine use.

7.  Morbid obesity.

8.  Hypertension.

9.  Hypovitaminosis D.

10.  Nicotine addiction.

11.  Insomnia.

12.  Bilateral lower extremity venous stasis of the lower extremity.

13.  Hypertensive cardiovascular disease.



PLAN:  At this time, the patient has been ordered an echocardiogram, the

results of which are pending.  The patient has been discharged on Cogentin

1 mg a.m. and at bedtime, Haldol 10 mg a.m. and at bedtime, nicotine patch

21 mg daily, Topamax 50 mg a.m. and at bedtime.  Today, the patient was

discharged.  The patient was taking following medications in the hospital;

Cogentin 1 mg a.m. and at bedtime, Cozaar 50 mg daily, Drisdol 50,000

weekly, Flonase nasal spray twice a day, Haldol 5 mg a.m. and at bedtime

and 5 mg at 04:00 p.m., Lasix 40 mg daily, nicotine patch 21 mg daily,

Sonata 5 mg at bedtime p.r.n., Tessalon Perles 200 three times a day,

Topamax 50 mg a.m. and at bedtime.  The patient was offered followup in the

office if the patient prefers to.  The patient was counseled about

cessation of smoking, weight loss, exercise and compliance with medication

and diet and cessation of smoking, alcohol and PCP use.





__________________________________________

George Davis MD





DD:  09/25/2017 21:35:32

DT:  09/25/2017 21:39:51

Job # [id number]

## 2017-10-05 ENCOUNTER — HOSPITAL ENCOUNTER (EMERGENCY)
Dept: HOSPITAL 42 - ED | Age: 37
Discharge: HOME | End: 2017-10-05
Payer: COMMERCIAL

## 2017-10-05 VITALS
DIASTOLIC BLOOD PRESSURE: 93 MMHG | HEART RATE: 91 BPM | RESPIRATION RATE: 16 BRPM | OXYGEN SATURATION: 99 % | SYSTOLIC BLOOD PRESSURE: 153 MMHG

## 2017-10-05 VITALS — TEMPERATURE: 98.7 F

## 2017-10-05 VITALS — BODY MASS INDEX: 52.9 KG/M2

## 2017-10-05 DIAGNOSIS — F16.10: Primary | ICD-10-CM

## 2017-10-05 DIAGNOSIS — L03.115: ICD-10-CM

## 2017-10-05 DIAGNOSIS — K08.89: ICD-10-CM

## 2017-10-05 DIAGNOSIS — F17.210: ICD-10-CM

## 2017-10-05 DIAGNOSIS — I10: ICD-10-CM

## 2017-10-05 LAB
ALBUMIN/GLOB SERPL: 1.1 {RATIO} (ref 1.1–1.8)
ALP SERPL-CCNC: 121 U/L (ref 38–126)
ALT SERPL-CCNC: 30 U/L (ref 7–56)
APPEARANCE UR: (no result)
AST SERPL-CCNC: 24 U/L (ref 14–36)
BACTERIA #/AREA URNS HPF: (no result) /[HPF]
BASOPHILS # BLD AUTO: 0.02 K/MM3 (ref 0–2)
BASOPHILS NFR BLD: 0.3 % (ref 0–3)
BILIRUB SERPL-MCNC: 0.3 MG/DL (ref 0.2–1.3)
BILIRUB UR-MCNC: NEGATIVE MG/DL
BUN SERPL-MCNC: 8 MG/DL (ref 7–21)
CALCIUM SERPL-MCNC: 9 MG/DL (ref 8.4–10.5)
CHLORIDE SERPL-SCNC: 107 MMOL/L (ref 98–107)
CO2 SERPL-SCNC: 25 MMOL/L (ref 21–33)
COLOR UR: YELLOW
EOSINOPHIL # BLD: 0.3 10*3/UL (ref 0–0.7)
EOSINOPHIL NFR BLD: 3.6 % (ref 1.5–5)
EPI CELLS #/AREA URNS HPF: (no result) /HPF (ref 0–5)
ERYTHROCYTE [DISTWIDTH] IN BLOOD BY AUTOMATED COUNT: 14.7 % (ref 11.5–14.5)
GLOBULIN SER-MCNC: 3.5 GM/DL
GLUCOSE SERPL-MCNC: 101 MG/DL (ref 70–110)
GLUCOSE UR STRIP-MCNC: NEGATIVE MG/DL
GRANULOCYTES # BLD: 4.29 10*3/UL (ref 1.4–6.5)
GRANULOCYTES NFR BLD: 57.9 % (ref 50–68)
HCT VFR BLD CALC: 36.5 % (ref 36–48)
KETONES UR STRIP-MCNC: NEGATIVE MG/DL
LEUKOCYTE ESTERASE UR-ACNC: NEGATIVE LEU/UL
LYMPHOCYTES # BLD: 2.3 10*3/UL (ref 1.2–3.4)
LYMPHOCYTES NFR BLD AUTO: 31.5 % (ref 22–35)
MCH RBC QN AUTO: 27.9 PG (ref 25–35)
MCHC RBC AUTO-ENTMCNC: 33.7 G/DL (ref 31–37)
MCV RBC AUTO: 82.8 FL (ref 80–105)
MONOCYTES # BLD AUTO: 0.5 10*3/UL (ref 0.1–0.6)
MONOCYTES NFR BLD: 6.7 % (ref 1–6)
PH UR STRIP: 8.5 [PH] (ref 4.7–8)
PLATELET # BLD: 323 10^3/UL (ref 120–450)
PMV BLD AUTO: 9.6 FL (ref 7–11)
POTASSIUM SERPL-SCNC: 3.9 MMOL/L (ref 3.6–5)
PROT SERPL-MCNC: 7.4 G/DL (ref 5.8–8.3)
PROT UR STRIP-MCNC: (no result) MG/DL
RBC # UR STRIP: NEGATIVE /UL
RBC #/AREA URNS HPF: (no result) /HPF (ref 0–2)
SODIUM SERPL-SCNC: 141 MMOL/L (ref 132–148)
SP GR UR STRIP: 1.01 (ref 1–1.03)
UROBILINOGEN UR STRIP-ACNC: 1 E.U./DL
WBC # BLD AUTO: 7.4 10^3/UL (ref 4.5–11)
WBC #/AREA URNS HPF: NEGATIVE /HPF (ref 0–6)

## 2017-10-05 PROCEDURE — 93971 EXTREMITY STUDY: CPT

## 2017-10-05 PROCEDURE — 80346 BENZODIAZEPINES1-12: CPT

## 2017-10-05 PROCEDURE — 80349 CANNABINOIDS NATURAL: CPT

## 2017-10-05 PROCEDURE — 80353 DRUG SCREENING COCAINE: CPT

## 2017-10-05 PROCEDURE — 80329 ANALGESICS NON-OPIOID 1 OR 2: CPT

## 2017-10-05 PROCEDURE — 83992 ASSAY FOR PHENCYCLIDINE: CPT

## 2017-10-05 PROCEDURE — 81001 URINALYSIS AUTO W/SCOPE: CPT

## 2017-10-05 PROCEDURE — 80358 DRUG SCREENING METHADONE: CPT

## 2017-10-05 PROCEDURE — 80361 OPIATES 1 OR MORE: CPT

## 2017-10-05 PROCEDURE — 82550 ASSAY OF CK (CPK): CPT

## 2017-10-05 PROCEDURE — 90791 PSYCH DIAGNOSTIC EVALUATION: CPT

## 2017-10-05 PROCEDURE — 80320 DRUG SCREEN QUANTALCOHOLS: CPT

## 2017-10-05 PROCEDURE — 93005 ELECTROCARDIOGRAM TRACING: CPT

## 2017-10-05 PROCEDURE — 82553 CREATINE MB FRACTION: CPT

## 2017-10-05 PROCEDURE — 80053 COMPREHEN METABOLIC PANEL: CPT

## 2017-10-05 PROCEDURE — 96360 HYDRATION IV INFUSION INIT: CPT

## 2017-10-05 PROCEDURE — 80345 DRUG SCREENING BARBITURATES: CPT

## 2017-10-05 PROCEDURE — 99283 EMERGENCY DEPT VISIT LOW MDM: CPT

## 2017-10-05 PROCEDURE — 85025 COMPLETE CBC W/AUTO DIFF WBC: CPT

## 2017-10-05 PROCEDURE — 80324 DRUG SCREEN AMPHETAMINES 1/2: CPT

## 2017-10-05 PROCEDURE — 71010: CPT

## 2017-10-05 NOTE — CARD
--------------- APPROVED REPORT --------------





EKG Measurement

Heart [id number]

TN [id number]

[id number]

[id number]

[id number]



<Conclusion>

Normal sinus rhythm

Normal ECG

## 2017-10-05 NOTE — US
PROCEDURE:  Right lower extremity venous US 



HISTORY:

Leg pain and swelling. Evaluate for DVT.



PHYSICIAN(S):  Segundo Bulter M.D.



TECHNIQUE:

Duplex sonography and color-flow Doppler with graded compression were 

used to evaluate the deep venous system of the right lower extremity. 



FINDINGS:

The visualized deep venous system of the right lower extremity is 

sonographically normal and compressible. Normal waveforms and 

augmentation are seen. There is no sonographic evidence for deep 

venous thrombosis in the visualized segments of the right lower 

extremity.



IMPRESSION:

1. No sonographic evidence for deep venous thrombosis in the 

visualized segments of the right lower extremity.

## 2017-10-05 NOTE — ED PDOC
Arrival/HPI





- General


Historian: Patient, Family





- History of Present Illness


Symptom Onset: Sudden


Symptom Course: Worsening





- General


Chief Complaint: Psychiatric Evaluation


Time Seen by Provider: 10/05/17 16:21





- History of Present Illness


Narrative History of Present Illness (Text): 





10/05/17 16:50


37-year-old female with a history of bipolar disorder and schizophrenia PCP 

usage presents today brought in by family for depression. Patient states she's 

been feeling very depressed lately states she hasn't been sleeping. Patient 

admits to using PCP 2 nights ago. Patient denies chest pain or shortness of 

breath. Denies fevers or chills. She is complaining of rash to the right leg. 

Patient complaining of cough. Admits to drinking alcohol. Denies abdominal 

pain. No vomiting or diarrhea. Patient has not been taking her medications per 

family. Family states that the patient has been out wandering around town and 

they have had difficulty locating her. Patient has a history of doing this in 

the past. (Jennifer Tyler)





Past Medical History





- Provider Review


Nursing Documentation Reviewed: Yes





- Travel History


Have you recently traveled outside [place] w/in the past 3 mons?: No





- Infectious Disease


Hx of Infectious Diseases: None





- Tetanus Immunization


Tetanus Immunization: Unknown





- Cardiac


Hx Cardiac Disorders: Yes


Hx Hypertension: Yes





- Pulmonary


Hx Respiratory Disorders: No


Hx Tuberculosis: No





- Neurological


Hx Neurological Disorder: Yes


HX Cerebrovascular Accident: No


Hx Seizures: No


Other/Comment: Insomnia





- HEENT


Hx HEENT Disorder: No





- Renal


Hx Renal Disorder: No





- Endocrine/Metabolic


Hx Endocrine Disorders: No





- Hematological/Oncological


Hx Blood Disorders: No


Hx Cancer: No





- Integumentary


Hx Dermatological Disorder: No





- Musculoskeletal/Rheumatological


Hx Musculoskeletal Disorders: No





- Gastrointestinal


Hx Gastrointestinal Disorders: No





- Genitourinary/Gynecological


Hx Genitourinary Disorders: No


Hx Sexually Transmitted Diseases: No





- Psychiatric


Hx Psychophysiologic Disorder: Yes


Hx Anxiety: Yes


Hx Bipolar Disorder: Yes


Hx Schizophrenia: Yes


Hx Substance Use: Yes





- Anesthesia


Hx Anesthesia: No





Family/Social History





- Physician Review


Nursing Documentation Reviewed: Yes


Family/Social History: Unknown Family HX


Smoking Status: Light Smoker < 10 Cigarettes Daily


Hx Alcohol Use: Yes


Hx Substance Use: Yes


Substance used: PCP; Marijuana





Allergies/Home Meds


Allergies/Adverse Reactions: 


Allergies





No Known Allergies Allergy (Verified 10/05/17 16:06)


 








Home Medications: 


 Home Meds











 Medication  Instructions  Recorded  Confirmed


 


Losartan [Cozaar] 0 mg PO DAILY 10/05/17 10/05/17


 


carBAMazepine [Tegretol] 0 mg PO BID 10/05/17 10/05/17














Review of Systems





- Review of Systems


Constitutional: absent: Fatigue, Fevers


ENT: absent: Sinus Congestion


Respiratory: Cough.  absent: SOB


Cardiovascular: absent: Chest Pain, Palpitations


Gastrointestinal: absent: Abdominal Pain, Nausea, Vomiting


Genitourinary Female: absent: Dysuria, Frequency


Musculoskeletal: Arthralgias (right calf pain).  absent: Back Pain, Neck Pain


Skin: Rash


Neurological: absent: Headache, Dizziness


Psychiatric: Depression.  absent: Anxiety, Suicidal Ideation





Physical Exam


Vital Signs Reviewed: Yes


Temperature: Afebrile


Blood Pressure: Normal


Pulse: Tachycardic


Respiratory Rate: Normal


Appearance: Positive for: Well-Appearing, Non-Toxic, Comfortable


Pain Distress: None


Mental Status: Positive for: Alert and Oriented X 3





- Systems Exam


Head: Present: Atraumatic


Pupils: Present: PERRL


Extroacular Muscles: Present: EOMI


Conjunctiva: Present: Normal


Mouth: Present: Moist Mucous Membranes.  No: Normal Teeth (poor dentition; + 

dental fracture, + left lower molar tenderness)


Neck: Present: Normal Range of Motion


Respiratory/Chest: Present: Clear to Auscultation, Good Air Exchange.  No: 

Respiratory Distress, Accessory Muscle Use


Cardiovascular: Present: Peripheal Pulses Present, Tachycardic


Abdomen: No: Tenderness, Distention, Rebound, Guarding


Back: Present: Normal Inspection


Upper Extremity: Present: Normal ROM.  No: Tenderness


Lower Extremity: Present: CALF TENDERNESS, Normal ROM, Tenderness, Erythema (

right leg; there is a small area of erythema noted to right posterior calf; ), 

Neurovascularly Intact, Capillary Refill < 2 s, Other (maceration noted 

betweentoes; )


Neurological: Present: GCS=15, Speech Normal


Skin: Present: Warm, Dry


Psychiatric: Present: Alert, Oriented x 3, Depressed Mood.  No: Normal Affect, 

Suicidal Ideation


Vital Signs











  Temp Pulse Resp BP Pulse Ox


 


 10/05/17 20:00   91 H  16  153/93 H  99


 


 10/05/17 16:12  98.7 F  102 H  18  123/89  95














Medical Decision Making


ED Course and Treatment: 








10/05/17 17:19


I was available for consultation during PA evaluation. The chart reviewed by me

, and I agree with disposition. The documented history was done by the 

physician extender. The documented physical exam was done by physician 

extender. The documented procedures were done by physician extender.  (Aric Green)





10/05/17 16:57


Patient is nontoxic well-appearing in no distress vital signs are stable.





CBC WNL


CMP WNL





Tylenol WNL


Salicylate WNL


Alcohol level WNL





Urine drug screen : +PCP





UA; wnl





cxr: wnl








duplex right leg; no dvt 








ekg NSR at 84b/m no st elevations normal axis, normal intervals. 








pt is medically cleared for PES evaluation


Patient was seen and evaluated by PES screener:  Talon





pt cleared psychiatrically for discharge





Patient reassessment: Patient alert oriented in no distress stable vital signs.








I will place the patient on Keflex for cellulitis and clindamycin for dental 

infection. Advised follow-up with a PMD and dentist within the next 2 days. 

Advised me to return if symptoms worsen persist or if new concerning symptoms 

develop





Patient verbalizes understanding of discharge instructions and need for 

immediate followup.








Impression; toothache, PCP USe, Cellulitis, leg, fungal infection,foot


keflex; 1 capsule 4 times daily x 7 days


clindamycin; 3 times daily x 7 days


lotrimin twice daily


follow up with the Primary care physician tomorrow


Follow up with the dentist within the next 2 days. 


Return immediately if symptoms worsen,persist or if new symptoms develop. 





10/05/17 20:46


 (Jennifer Tyler)





- Lab Interpretations


Lab Results: 








 10/05/17 16:34 





 10/05/17 16:34 





 Lab Results





10/05/17 18:55: Urine Opiates Screen Negative, Urine Methadone Screen Negative, 

Ur Barbiturates Screen Negative, Ur Phencyclidine Scrn Positive H, Ur 

Amphetamines Screen Negative, U Benzodiazepines Scrn Negative, U Oth Cocaine 

Metabols Negative, U Cannabinoids Screen Negative


10/05/17 18:55: Urine Color Yellow, Urine Appearance Cloudy, Urine pH 8.5, Ur 

Specific Gravity 1.010, Urine Protein Trace H, Urine Glucose (UA) Negative, 

Urine Ketones Negative, Urine Blood Negative, Urine Nitrate Negative, Urine 

Bilirubin Negative, Urine Urobilinogen 1.0 H, Ur Leukocyte Esterase Negative, 

Urine RBC 0 - 2, Urine WBC Negative, Ur Epithelial Cells 0 - 2, Urine Bacteria 

Few


10/05/17 16:34: Alcohol, Quantitative < 10


10/05/17 16:34: Salicylates < 1 L, Acetaminophen < 10.0 L


10/05/17 16:34: Sodium 141, Potassium 3.9, Chloride 107, Carbon Dioxide 25, 

Anion Gap 13, BUN 8, Creatinine 1.0, Est GFR (African Amer) > 60, Est GFR (Non-

Af Amer) > 60, Random Glucose 101, Calcium 9.0, Total Bilirubin 0.3, AST 24, 

ALT 30, Alkaline Phosphatase 121, Total Creatine Kinase 268 H, CK-MB (CK-2) 0.5

, CK-MB (CK-2) % Cancelled, Total Protein 7.4, Albumin 3.9, Globulin 3.5, 

Albumin/Globulin Ratio 1.1


10/05/17 16:34: WBC 7.4  D, RBC 4.41, Hgb 12.3, Hct 36.5, MCV 82.8, MCH 27.9, 

MCHC 33.7, RDW 14.7 H, Plt Count 323, MPV 9.6, Gran % 57.9, Lymph % (Auto) 31.5

, Mono % (Auto) 6.7 H, Eos % (Auto) 3.6, Baso % (Auto) 0.3, Gran # 4.29, Lymph 

# 2.3, Mono # 0.5, Eos # 0.3, Baso # 0.02











- RAD Interpretation


Radiology Orders: 








10/05/17 16:23


CHEST PORTABLE [RAD] Stat 





10/05/17 16:56


DUPLEX LOWER EXTRM VEIN RIGHT [US] Stat 














- Medication Orders


Current Medication Orders: 











Discontinued Medications





Cephalexin Monohydrate (Keflex)  500 mg PO STAT STA


   PRN Reason: Protocol


   Stop: 10/05/17 20:10


Clindamycin HCl (Cleocin)  150 mg PO STAT STA


   PRN Reason: Protocol


   Stop: 10/05/17 20:13


Sodium Chloride (Sodium Chloride 0.9%)  1,000 mls @ 999 mls/hr IV .Q1H1M STA


   Stop: 10/05/17 17:34


   Last Admin: 10/05/17 16:38  Dose: 999 mls/hr





eMAR Start Stop


 Document     10/05/17 16:38  SE  (Rec: 10/05/17 16:38  SE  [facility])


     Intravenous Solution


      Start Date                                 10/05/17


      Start Time                                 16:38


      End Date                                   10/05/17


      End time                                   17:38


      Total Infusion Time                        60














Disposition/Present on Arrival





- Present on Arrival


Any Indicators Present on Arrival: No


History of DVT/PE: No


History of Uncontrolled Diabetes: No


Urinary Catheter: No


History of Decub. Ulcer: No


History Surgical Site Infection Following: None





- Disposition


Have Diagnosis and Disposition been Completed?: Yes


Disposition Time: 20:16


Patient Plan: Discharge





- Disposition


Diagnosis: 


 PCP (phencyclidine) abuse, Cellulitis, leg, Toothache





Disposition: HOME/ ROUTINE


Patient Problems: 


 Current Active Problems











Problem Status Onset


 


Cellulitis, leg Acute  


 


PCP (phencyclidine) abuse Acute  


 


Toothache Acute  











Condition: GOOD


Discharge Instructions (ExitCare):  Cellulitis (ED), Toothache (ED)


Additional Instructions: 


keflex; 1 capsule 4 times daily x 7 days


clindamycin; 3 times daily x 7 days


follow up with the Primary care physician tomorrow


Follow up with the dentist within the next 2 days. 


Return immediately if symptoms worsen,persist or if new symptoms develop. 


Prescriptions: 


Cephalexin [Keflex] 500 mg PO QID #28 capsule


Clindamycin [Cleocin] 150 mg PO TID #21 cap


Clotrimazole 1% Cream [Lotrimin 1%] 1 appl TP BID #1 tube


Referrals: 


[facility] [place] - Follow up with primary


Ryan Candelario DMD [Non-Staff] - Follow up with primary


Nafisa Ybarra MD [Staff Provider] - Follow up with primary


Forms:  [employer] (English)

## 2017-10-06 NOTE — RAD
HISTORY:

PES eval  



COMPARISON:

Portable chest 09/17/2017. 



FINDINGS:



LUNGS:

No active pulmonary disease.



PLEURA:

No significant pleural effusion identified, no pneumothorax apparent.



CARDIOVASCULAR:

Normal.



OSSEOUS STRUCTURES:

No significant abnormalities.



VISUALIZED UPPER ABDOMEN:

Normal.



OTHER FINDINGS:

None.



IMPRESSION:

No interval acute cardiopulmonary disease appreciated.

## 2017-10-07 ENCOUNTER — HOSPITAL ENCOUNTER (INPATIENT)
Dept: HOSPITAL 42 - ED | Age: 37
LOS: 17 days | Discharge: HOME | DRG: 430 | End: 2017-10-24
Attending: PSYCHIATRY & NEUROLOGY | Admitting: PSYCHIATRY & NEUROLOGY
Payer: COMMERCIAL

## 2017-10-07 VITALS — BODY MASS INDEX: 41.9 KG/M2

## 2017-10-07 DIAGNOSIS — J45.909: ICD-10-CM

## 2017-10-07 DIAGNOSIS — E66.01: ICD-10-CM

## 2017-10-07 DIAGNOSIS — F25.9: Primary | ICD-10-CM

## 2017-10-07 DIAGNOSIS — Z91.14: ICD-10-CM

## 2017-10-07 DIAGNOSIS — K02.9: ICD-10-CM

## 2017-10-07 DIAGNOSIS — F16.14: ICD-10-CM

## 2017-10-07 DIAGNOSIS — F41.9: ICD-10-CM

## 2017-10-07 DIAGNOSIS — E55.9: ICD-10-CM

## 2017-10-07 DIAGNOSIS — I87.2: ICD-10-CM

## 2017-10-07 DIAGNOSIS — I11.9: ICD-10-CM

## 2017-10-07 DIAGNOSIS — Z91.19: ICD-10-CM

## 2017-10-07 DIAGNOSIS — F16.159: ICD-10-CM

## 2017-10-07 DIAGNOSIS — F17.210: ICD-10-CM

## 2017-10-07 DIAGNOSIS — G47.00: ICD-10-CM

## 2017-10-07 DIAGNOSIS — F10.10: ICD-10-CM

## 2017-10-07 LAB
ALBUMIN/GLOB SERPL: 1.1 {RATIO} (ref 1.1–1.8)
ALP SERPL-CCNC: 106 U/L (ref 38–126)
ALT SERPL-CCNC: 23 U/L (ref 7–56)
APPEARANCE UR: (no result)
AST SERPL-CCNC: 25 U/L (ref 14–36)
BASOPHILS # BLD AUTO: 0.02 K/MM3 (ref 0–2)
BASOPHILS NFR BLD: 0.3 % (ref 0–3)
BILIRUB SERPL-MCNC: 0.3 MG/DL (ref 0.2–1.3)
BILIRUB UR-MCNC: NEGATIVE MG/DL
BUN SERPL-MCNC: 8 MG/DL (ref 7–21)
CALCIUM SERPL-MCNC: 9 MG/DL (ref 8.4–10.5)
CHLORIDE SERPL-SCNC: 108 MMOL/L (ref 98–107)
CO2 SERPL-SCNC: 26 MMOL/L (ref 21–33)
COLOR UR: (no result)
EOSINOPHIL # BLD: 0.2 10*3/UL (ref 0–0.7)
EOSINOPHIL NFR BLD: 3.3 % (ref 1.5–5)
ERYTHROCYTE [DISTWIDTH] IN BLOOD BY AUTOMATED COUNT: 14.6 % (ref 11.5–14.5)
GLOBULIN SER-MCNC: 3.4 GM/DL
GLUCOSE SERPL-MCNC: 102 MG/DL (ref 70–110)
GLUCOSE UR STRIP-MCNC: NEGATIVE MG/DL
GRANULOCYTES # BLD: 4.17 10*3/UL (ref 1.4–6.5)
GRANULOCYTES NFR BLD: 60.6 % (ref 50–68)
HCT VFR BLD CALC: 37 % (ref 36–48)
KETONES UR STRIP-MCNC: NEGATIVE MG/DL
LEUKOCYTE ESTERASE UR-ACNC: NEGATIVE LEU/UL
LYMPHOCYTES # BLD: 2 10*3/UL (ref 1.2–3.4)
LYMPHOCYTES NFR BLD AUTO: 29.5 % (ref 22–35)
MCH RBC QN AUTO: 28 PG (ref 25–35)
MCHC RBC AUTO-ENTMCNC: 33.8 G/DL (ref 31–37)
MCV RBC AUTO: 83 FL (ref 80–105)
MONOCYTES # BLD AUTO: 0.4 10*3/UL (ref 0.1–0.6)
MONOCYTES NFR BLD: 6.3 % (ref 1–6)
PH UR STRIP: 7 [PH] (ref 4.7–8)
PLATELET # BLD: 310 10^3/UL (ref 120–450)
PMV BLD AUTO: 9.2 FL (ref 7–11)
POTASSIUM SERPL-SCNC: 3.8 MMOL/L (ref 3.6–5)
PROT SERPL-MCNC: 7.2 G/DL (ref 5.8–8.3)
PROT UR STRIP-MCNC: NEGATIVE MG/DL
RBC # UR STRIP: NEGATIVE /UL
SODIUM SERPL-SCNC: 142 MMOL/L (ref 132–148)
SP GR UR STRIP: <= 1.005 (ref 1–1.03)
UROBILINOGEN UR STRIP-ACNC: 0.2 E.U./DL
WBC # BLD AUTO: 6.9 10^3/UL (ref 4.5–11)

## 2017-10-07 NOTE — CARD
--------------- APPROVED REPORT --------------





EKG Measurement

Heart [id number]

AR [id number]

[id number]

[id number]

[id number]



<Conclusion>

Normal sinus rhythm

Normal ECG

## 2017-10-07 NOTE — RAD
HISTORY:

psych  



COMPARISON:

Chest x-ray performed 10/5/17 



TECHNIQUE:

Chest, one view.



FINDINGS:

The patient's chin obscures evaluation of the lung apices. 

Examination limited by habitus.



LUNGS:

No focal consolidation.



PLEURA:

No significant pleural effusion identified. No definite pneumothorax .



CARDIOVASCULAR:

Heart size appears top normal.



OSSEOUS STRUCTURES:

 Partially imaged fixation of the mid right humerus.



VISUALIZED UPPER ABDOMEN:

Unremarkable.



OTHER FINDINGS:

None.



IMPRESSION:

No focal consolidation, significant pleural effusion, or definite 

pneumothorax identified.
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
## 2017-10-08 PROCEDURE — GZ3ZZZZ MEDICATION MANAGEMENT: ICD-10-PCS | Performed by: PSYCHIATRY & NEUROLOGY

## 2017-10-08 RX ADMIN — DIPHENHYDRAMINE HYDROCHLORIDE PRN MG: 50 INJECTION INTRAMUSCULAR; INTRAVENOUS at 17:37

## 2017-10-08 RX ADMIN — DIPHENHYDRAMINE HYDROCHLORIDE PRN MG: 50 INJECTION INTRAMUSCULAR; INTRAVENOUS at 06:37

## 2017-10-08 RX ADMIN — ERGOCALCIFEROL SCH CAP: 1.25 CAPSULE ORAL at 11:36
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
## 2017-10-08 NOTE — CT
PROCEDURE:  CT HEAD WITHOUT CONTRAST.



HISTORY:

AMS



COMPARISON:

05/12/2016 



TECHNIQUE:

Axial computed tomography images were obtained through the head/brain 

without intravenous contrast.  



Radiation dose:



Total exam DLP = 775.01 mGy-cm.



This CT exam was performed using one or more of the following dose 

reduction techniques: Automated exposure control, adjustment of the 

mA and/or kV according to patient size, and/or use of iterative 

reconstruction technique.



FINDINGS:



HEMORRHAGE:

No intracranial hemorrhage. 



BRAIN:

No mass effect or edema.  No atrophy or chronic microvascular 

ischemic changes.



VENTRICLES:

Unremarkable. No hydrocephalus. 



CALVARIUM:

Unremarkable.



PARANASAL SINUSES:

Unremarkable as visualized. No significant inflammatory changes.



MASTOID AIR CELLS:

Unremarkable as visualized. No inflammatory changes.



OTHER FINDINGS:

None.



IMPRESSION:

No acute intracranial abnormalities. No significant findings to 

account for the clinical presentation.  No significant interval 

change compared to the prior examination(s).

## 2017-10-09 LAB
ALBUMIN/GLOB SERPL: 1.1 {RATIO} (ref 1.1–1.8)
ALP SERPL-CCNC: 79 U/L (ref 38–126)
ALT SERPL-CCNC: 25 U/L (ref 7–56)
AST SERPL-CCNC: 20 U/L (ref 14–36)
BASOPHILS # BLD AUTO: 0.01 K/MM3 (ref 0–2)
BASOPHILS NFR BLD: 0.2 % (ref 0–3)
BILIRUB DIRECT SERPL-MCNC: 0.3 MG/DL (ref 0–0.4)
BILIRUB SERPL-MCNC: 0.3 MG/DL (ref 0.2–1.3)
BUN SERPL-MCNC: 12 MG/DL (ref 7–21)
CALCIUM SERPL-MCNC: 9.2 MG/DL (ref 8.4–10.5)
CHLORIDE SERPL-SCNC: 106 MMOL/L (ref 98–107)
CO2 SERPL-SCNC: 24 MMOL/L (ref 21–33)
EOSINOPHIL # BLD: 0.3 10*3/UL (ref 0–0.7)
EOSINOPHIL NFR BLD: 4.5 % (ref 1.5–5)
ERYTHROCYTE [DISTWIDTH] IN BLOOD BY AUTOMATED COUNT: 14.6 % (ref 11.5–14.5)
GLOBULIN SER-MCNC: 3.4 GM/DL
GLUCOSE SERPL-MCNC: 117 MG/DL (ref 70–110)
GRANULOCYTES # BLD: 3.19 10*3/UL (ref 1.4–6.5)
GRANULOCYTES NFR BLD: 51.1 % (ref 50–68)
HCT VFR BLD CALC: 39.9 % (ref 36–48)
LYMPHOCYTES # BLD: 2.3 10*3/UL (ref 1.2–3.4)
LYMPHOCYTES NFR BLD AUTO: 36.3 % (ref 22–35)
MAGNESIUM SERPL-MCNC: 2 MG/DL (ref 1.7–2.2)
MCH RBC QN AUTO: 27.8 PG (ref 25–35)
MCHC RBC AUTO-ENTMCNC: 33.6 G/DL (ref 31–37)
MCV RBC AUTO: 82.8 FL (ref 80–105)
MONOCYTES # BLD AUTO: 0.5 10*3/UL (ref 0.1–0.6)
MONOCYTES NFR BLD: 7.9 % (ref 1–6)
PLATELET # BLD: 315 10^3/UL (ref 120–450)
PMV BLD AUTO: 9.5 FL (ref 7–11)
POTASSIUM SERPL-SCNC: 4 MMOL/L (ref 3.6–5)
PROT SERPL-MCNC: 7.3 G/DL (ref 5.8–8.3)
SODIUM SERPL-SCNC: 140 MMOL/L (ref 132–148)
WBC # BLD AUTO: 6.2 10^3/UL (ref 4.5–11)

## 2017-10-09 RX ADMIN — PANTOPRAZOLE SODIUM SCH MG: 40 TABLET, DELAYED RELEASE ORAL at 09:36
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
## 2017-10-09 NOTE — PCM.BM
Treatment assets and liabiliti


Patient Assests: adapts well, cooperative, ADL independent, physically healthy, 

good interpersonal skills





- Diagnosis


(1) Schizophrenia


Status: Chronic   


Interventions: 





10/08/17 08:34


Psychoeducation/psychotherapy


Psychopharmacology/adjustment of medications as needed/ monitoring possible 

side effects


Evaluate pt on daily basis


Compliance with medications and follow up appointments


Long acting medication Haldol if pt is noncompliant with pill form


Suicide and homicide risk assessment and prevention, coping strategies, safety 

plan


Relapse prevention


Reduction of symptoms


Improve functional status


Possible assertive community treatment


Cognitive behavioral therapy


Family involvement


Possible social skill training as outpatient








(2) Phencyclidine-induced psychosis


Status: Acute   


Interventions: 





10/08/17 08:35





Maintaining sobriety


Relapse prevention


Possible rehabilitation


Motivational interviewing


12-step programs: AA meetings


10/08/17 08:35
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
## 2017-10-09 NOTE — PN
DATE:  10/09/2017



SUBJECTIVE:  The patient is seen lying in the bed in room 519, bed 1.  The

patient appears to be awake and responsive.  The patient has been sedated

with Benadryl, Haldol, and Ativan.  The patient according to the nurses'

note remained actively psychotic, mumbling, and ranting and bizarre voices.

The patient is on 1:1 at present.  Overnight nurses' notes were noted.  The

patient had flat affect, disorganized speech, and unclear mumbled speech. 

Responses were tangential, irrelevant to question, and disoriented.  The

patient slept well.  The patient was found to be psychotic.  The patient

was hallucinating.  The patient was placed on placed on 1:1.



OBJECTIVE:

VITAL SIGNS:  T-max 97.7, pulse 78 to 85 to 95, blood pressure ranging from

154/94, 149/85, 156/99, respirations 19, and O2 sat 96%.

HEENT:  Head examination normocephalic and atraumatic.  HEENT examination

shows pinkish conjunctivae.  Anicteric sclerae.  No oropharyngeal lesion.

NECK:  No neck rigidity.

CHEST:  Kyphosis.

CARDIOVASCULAR:  S1 and S2, regular rhythm.

ABDOMEN:  Soft and obese.  Positive bowel sounds.

GENITALIA:  Female.

RECTAL:  Deferred.

EXTREMITIES:  Shows no pitting edema.  No calf tenderness.  No Homans'

sign.

MUSCULOSKELETAL:  Shows an elevated body mass index of 42.

NEUROLOGICAL:  The patient is alert, awake, responsive, and sluggish.  The

patient is seen lying in the bed.  Motor strength is 5/5.  The patient is

able to move upper and lower extremity without assistance.



DIAGNOSTIC DATA:  None from today.  The patient's CT of the head, which was

done yesterday is reviewed for evaluation of the patient's mental status. 

CT of the head was negative.



IMPRESSION:

1.  Active psychosis with behavioral disorder.

2.  Active psychosis with behavioral disorder with flat affect,

disorganized speech, and tangential responses.

3.  Morbid obesity.

4.  Hypertension.

5.  Tachycardia.

6.  History of chronic noncompliance.

7.  Active PCP use.

8.  Active alcohol, tobacco abuse, and dependence.

9.  Insomnia.

1.  Questionable acute exacerbation of schizoaffective disorder with

bizarre behavior and behavioral disorder.

2.  Chronic noncompliance and poor compliance with medication.

3.  Active PCP use.

4.  Substance-induced mood disorder.

5.  Alcohol and tobacco use and abuse disorder.

6.  Morbid obesity.

7.  Transient uncontrolled hypertension.

8.  Questionable altered mental status secondary to underlying psychiatric

problem versus medications.

9.  Active nicotine, alcohol, and substance abuse disorder.

10.  Insomnia.

11.  Hypertension.

12.  Hypovitaminosis D.

13.  Active nicotine addiction.



PLAN:  At this time, the patient has been ordered following medications;

Ativan 2 mg IV q.6 hours p.r.n., Benadryl 50 IM q.6 hours p.r.n., Cogentin

2 mg a.m. and at bedtime, Cozaar 25 mg daily, which will be considered for

increasing if the patient's blood pressure remains elevated, Drisdol 50,000

weekly, Haldol 5 mg IM q.6 hours p.r.n. and Haldol 10 mg a.m. and at

bedtime, heparin 5000 subcutaneously q.8 hours, nicotine patch 7 mg daily,

Protonix 40 mg daily, Tylenol p.r.n., and 1:1 observation.  At present, the

patient is to be continued with above therapeutic intervention.  The

patient will be ordered repeat labs today for monitoring of the patient's

clinical condition and objective data.  The patient at present is to be

continued on above therapeutic intervention as psychiatry.  At present, the

patient is otherwise medically and relatively stable, but the patient needs

acute psychiatric intervention for her management of acute psychosis and

psychiatric status.



Dictated and electronically signed, not read.





__________________________________________

George Davis MD





DD:  10/09/2017 9:05:29

DT:  10/09/2017 9:12:30

Job # [id number]





MTDD

## 2017-10-09 NOTE — PCM.BM
Treatment Plan Problems





- Problems identified on initial assessmt


  ** Delusions


Date Initiated: 10/09/17


Time Initiated: 11:24


Assessment reference: NA


Priority: 1





  ** Thought Process


Date Initiated: 10/09/17


Time Initiated: 11:25


Assessment reference: NA


Status: Active


Priority: 2





  ** Agitated Behavior


Date Initiated: 10/09/17


Time Initiated: 11:25


Assessment reference: NA


Status: Active


Priority: 3





Treatment assets and liabiliti


Patient Assests: adapts well, cooperative, ADL independent, physically healthy, 

good interpersonal skills


Patient Liabilities: relationship conflicts, substance abuse





- Milieu Protocol


Maintain good personal hygiene: daily Encourage regular showers, daily Remind 

patient to perform daily oral care, daily Assist patient to perform ADL's


Maintain personal safety: every shift Educate patient to report safety concerns 

to staff, every shift Monitor environment for contraband/sharps


Medication safety: Monitor for expected outcome, potential side effects: every 

shift, Assess barriers to learning: every shift, Assess readiness for 

medication education: every shift





Family Contact





- Goals for Treatment


Patient goals for treatment: "To watch [employer]"





Discharge/Continuing Care





- Education Needs


Education Needs: Patient Medication, Patient Diagnosis/Disease Process, Patient 

Coping Skills, Patient Health Practices/Safety, Patient Personal Hygiene/

Grooming, Patient Aftercare Safety Plan





- Discharge


Discharge Criteria: Tolerates medication w/o severe side effects, Reduction of 

target symptoms


Discharge to:: Home

## 2017-10-10 RX ADMIN — DIPHENHYDRAMINE HYDROCHLORIDE PRN MG: 50 INJECTION INTRAMUSCULAR; INTRAVENOUS at 23:46

## 2017-10-10 RX ADMIN — DIPHENHYDRAMINE HYDROCHLORIDE PRN MG: 50 INJECTION INTRAMUSCULAR; INTRAVENOUS at 01:28

## 2017-10-10 RX ADMIN — PANTOPRAZOLE SODIUM SCH MG: 40 TABLET, DELAYED RELEASE ORAL at 06:20

## 2017-10-10 RX ADMIN — LEVALBUTEROL SCH MG: 0.63 SOLUTION RESPIRATORY (INHALATION) at 22:30

## 2017-10-10 RX ADMIN — LEVALBUTEROL SCH: 0.63 SOLUTION RESPIRATORY (INHALATION) at 19:41

## 2017-10-10 NOTE — PN
DATE:  10/10/2017



SUBJECTIVE:  The patient is seen in day room.  The patient is today sitting

up in the wheelchair.  The patient is much more alert, awake, responsive,

cheerful, but the patient's speech is still the patient's affect is still

constricted.  The patient complaining of insomnia and poor sleep.  The

patient has been found to be anxious, restless.  The patient has been on

1:1.  According to the his psychiatric nursing note, the patient had

blunted affect and psychotic, disorganized speech and thoughts were noted. 

The patient had mumbled speech.  The patient was sitting up in the

wheelchair.  The patient today is much calmer and cooperative in the

morning.  Smiling.



OBJECTIVE:

VITAL SIGNS:  T-max 98.4.  Pulse 80.  Blood pressure 113/72, 139/77,

154/95, 149/85.  Respiration 16-19.  O2 sat 96%.

HEAD:  Examination normocephalic, atraumatic.

HEENT:  Examination shows pink conjunctivae.  Anicteric sclerae.  No

oropharyngeal lesion.  No neck rigidity.

CHEST:  Examination kyphosis.

LUNGS:  Examination shows no rales, crackles or wheezing.

CARDIOVASCULAR:  Examination S1, S2, regular rhythm.

ABDOMEN:  Obese.  Positive bowel sound.

GENITALIA:  Female.

RECTAL:  Examination is deferred.

EXTREMITY:  Shows chronic nonpitting swelling of the lower extremity.

MUSCULOSKELETAL:  Examination shows an elevated body mass index of 42. 

Cranial nerve II through XII limited.  Gait examination is not tested.



IMPRESSION:

1.  Acute psychosis with disorganized, tangential speech and thoughts with

gait dysfunction.

2.  Morbid obesity.

3.  Insomnia.

4.  Anxious, restless and agitated state with anxiety.

5.  History of hypertension.

6.  Morbid obesity with elevated body mass index of 42.

7.  Urine drug screen positive for PCP.

8.  Active nicotine, alcohol and drug abuse and dependence.

9.  Paranoia and auditory, visual hallucination.

10.  Poor personal hygiene.

11.  Psychomotor agitation and retardation.

12.  Substance-induced mood disorder.

13.  Schizoaffective disorder.

14.  Nicotine, alcohol PCP use abuse disorder.

15.  History of questionable asthma.

1.  Active psychosis with behavioral disorder.

2.  Active psychosis with behavioral disorder with flat affect,

disorganized speech, and tangential responses.

3.  Morbid obesity.

4.  Hypertension.

5.  Tachycardia.

6.  History of chronic noncompliance.

7.  Active PCP use.

8.  Active alcohol, tobacco abuse, and dependence.

9.  Insomnia.

1.  Questionable acute exacerbation of schizoaffective disorder with

bizarre behavior and behavioral disorder.

2.  Chronic noncompliance and poor compliance with medication.

3.  Active PCP use.

4.  Substance-induced mood disorder.

5.  Alcohol and tobacco use and abuse disorder.

6.  Morbid obesity.

7.  Transient uncontrolled hypertension.

8.  Questionable altered mental status secondary to underlying psychiatric

problem versus medications.

9.  Active nicotine, alcohol, and substance abuse disorder.

10.  Insomnia.

11.  Hypertension.

12.  Hypovitaminosis D.

13.  Active nicotine addiction.





PLAN:  At this time, the patient is on following medications; Ativan 2 mg

IM q. 6 hours p.r.n., Benadryl 50 mg IM q. 6 hours p.r.n., Cogentin 2 mg

a.m. and at bedtime, Cozaar 25 mg daily, Drisdol 50,000 weekly, Haldol 5 mg

IM q. 6 hours p.r.n., Haldol 10 mg a.m. and at bedtime, heparin 5000 subcu

q. 8 for DVT prophylaxis, nicotine patch 7 mg daily, Protonix 40 mg daily,

Tylenol 650 q. 4 p.r.n., Xopenex nebulizer 0.63 mg every 6 hours.  Regular

diet ordered, 1:1 observation ordered.  At present, the patient is to be

continued on above therapeutic and diagnostic intervention as per

psychiatry.  The patient's diagnostic data was reviewed which is relatively

stable at this time.  The patient has been advised about continuation of

the medical therapy.  The patient was advised strict compliance.  The

patient is advised to be out of bed to chair.  The patient will be

continued on above therapeutic intervention.  The patient will be ordered

physical therapy, occupational therapy, ambulation therapy for gait

training, ambulation.



Dictated and electronically signed, not read.



__________________________________________

George Davis MD





DD:  10/10/2017 10:30:55

DT:  10/10/2017 10:41:22

Job # [id number]



MTDD

## 2017-10-10 NOTE — PCM.PYCHPN
Psychiatric Progress Note





- Psychiatric Progress Note


Patient seen today, length of contact: 30min


Patient Chief Complaint: 





"I am better, I am better, I am better, do you want to see me? do you want to 

see me? how do I feel? how do I feel? how do I feel?"


Medical Problems: 





asthma, obesity








Diagnostic Results: 














 10/09/17 09:40 





 10/09/17 09:40 





 Lab Results





10/09/17 09:40: Sodium 140, Potassium 4.0, Chloride 106, Carbon Dioxide 24, 

Anion Gap 14, BUN 12, Creatinine 1.0, Est GFR (African Amer) > 60, Est GFR (Non-

Af Amer) > 60, Random Glucose 117 H, Calcium 9.2, Magnesium 2.0, Total 

Bilirubin 0.3, Direct Bilirubin 0.3, AST 20, ALT 25, Alkaline Phosphatase 79, 

Total Protein 7.3, Albumin 3.9, Globulin 3.4, Albumin/Globulin Ratio 1.1


10/09/17 09:40: WBC 6.2, RBC 4.82, Hgb 13.4, Hct 39.9, MCV 82.8, MCH 27.8, MCHC 

33.6, RDW 14.6 H, Plt Count 315, MPV 9.5, Gran % 51.1, Lymph % (Auto) 36.3 H, 

Mono % (Auto) 7.9 H, Eos % (Auto) 4.5, Baso % (Auto) 0.2, Gran # 3.19, Lymph # 

2.3, Mono # 0.5, Eos # 0.3, Baso # 0.01


10/07/17 11:05: Urine Opiates Screen Negative, Urine Methadone Screen Negative, 

Ur Barbiturates Screen Negative, Ur Phencyclidine Scrn Positive H, Ur 

Amphetamines Screen Negative, U Benzodiazepines Scrn Negative, U Oth Cocaine 

Metabols Negative, U Cannabinoids Screen Negative


10/07/17 11:05: Urine Color Straw, Urine Appearance Slight-cloudy, Urine pH 7.0

, Ur Specific Gravity <= 1.005, Urine Protein Negative, Urine Glucose (UA) 

Negative, Urine Ketones Negative, Urine Blood Negative, Urine Nitrate Negative, 

Urine Bilirubin Negative, Urine Urobilinogen 0.2, Ur Leukocyte Esterase Negative


10/07/17 10:53: Alcohol, Quantitative < 10


10/07/17 10:53: Salicylates < 1 L, Acetaminophen < 10.0 L


10/07/17 10:53: Sodium 142, Potassium 3.8, Chloride 108 H, Carbon Dioxide 26, 

Anion Gap 12, BUN 8, Creatinine 1.0, Est GFR (African Amer) > 60, Est GFR (Non-

Af Amer) > 60, Random Glucose 102, Calcium 9.0, Total Bilirubin 0.3, AST 25, 

ALT 23, Alkaline Phosphatase 106, Total Creatine Kinase 289 H, CK-MB (CK-2) 0.9

, CK-MB (CK-2) % Cancelled, Total Protein 7.2, Albumin 3.8, Globulin 3.4, 

Albumin/Globulin Ratio 1.1


10/07/17 10:53: WBC 6.9, RBC 4.46, Hgb 12.5, Hct 37.0, MCV 83.0, MCH 28.0, MCHC 

33.8, RDW 14.6 H, Plt Count 310, MPV 9.2, Gran % 60.6, Lymph % (Auto) 29.5, 

Mono % (Auto) 6.3 H, Eos % (Auto) 3.3, Baso % (Auto) 0.3, Gran # 4.17, Lymph # 

2.0, Mono # 0.4, Eos # 0.2, Baso # 0.02











Vital Signs











  Temp Pulse Resp BP Pulse Ox


 


 10/09/17 07:00  97.6 F  95 H  19  154/95 H  96


 


 10/08/17 03:00   85   149/85 


 


 10/07/17 22:00  97.7 F  78  20  148/68  99


 


 10/07/17 17:48   78  14  156/99 H  100


 


 10/07/17 16:41   80   174/113 H 


 


 10/07/17 15:59   83  16  174/113 H  98


 


 10/07/17 14:00   83  18  151/101 H  98


 


 10/07/17 12:52   64  18  128/73  95


 


 10/07/17 10:05  98.2 F  86  18  142/87  100














 











Temp Pulse Resp BP Pulse Ox


 


 98.4 F   80   16   113/72   96 


 


 10/10/17 07:03  10/10/17 07:03  10/10/17 07:03  10/10/17 07:03  10/09/17 07:00








DSM 5 Symptoms Update: 





Shortly pt is 38yo AAF with long h/o mental illness, h/o multiple admissions in 

the psychiatric inpatient unit (mostly in [facility] [place], recent admission to [facility] [place] less 

than two weeks ago), pt also has h/o substance use disorder, pt was admitted 

for evaluation and stabilization of disorganized, psychotic behavior, pt was 

found wondering on the streets with no shoes and pants on, pt was disorganized 

and psychotic, pt needed to be medicated in ED as well code Donaldo was called 

over night in the psych inpatient unit, pt has chronic noncompliance with 

medications and follow up appointments. 





pt is still on 1:1 for unsteady gait, PT evaluated pt, PT five times a week. 





pt is disorganized, psychotic, keep repeating the same question all over again 

and again, very low volume, monotonic, pressured speech. d/w pt's mother, pt 

gave verbal consent, as per mother, pt lost twin boys during the delivery in 

2000, then "everything started...," pt was very depressed after that. pt's 

mother asked about long term placement.  





pt seen next to the nursing station, pt was disorganized keep repeating 

herself... "I am better, I am better, I am better, do you want to see me? do 

you want to see me? how do I feel? how do I feel? how do I feel?" tried to 

shake this writer hand, then did not want to let her go. there is no meaningful 

conversation possible.





Review of Systems: see Medical consult 





MSE:


Pt deemed to be unreliable historian, pt is disorganized, poor personal hygiene

, good ADLs, psychomotor agitation/retardation, speech was: mumbling, eye 

contact: no eye contact , mood described: "I am better, I am better, I am better

, do you want to see me? do you want to see me? how do I feel? how do I feel? 

how do I feel?", affect: labile, mood incongruent, thought process:disorganized

, thought content: pt denied SI/ HI, pt obviously paranoid, psychotic, 

disorganized, internally preoccupied, insight/judgment: impaired, impulses are 

unpredictable. 





Impression:


schizoaffective disorder


PCP abuse


substance induced mood disorder


alcohol use disorder


tobacco abuse





Treatment plan: 


Milieu/structure/supportive therapy


Medical consult appreciated, see medical team note for more detailed info


SW consultation for discharge plan and social issues


Med management


patient currently is on one-to-one observation for safety, risk of falls, 

disorganized behavior.


Benztropine 2mg po amhs for EPS


Haldol 10mg po amhs for psychosis


would consider to start Haldol Dec, pt is noncompliant with meds and f/u appts


Losartan [Cozaar] 50 mg PO DAILY 


Nicotine 7 mg/24 hr [Nicoderm CQ] 1 patch TD DAILY 


Family involvement


Follow up on labs


Will monitor closely


SW evaluation for d/c planning


Pt was educated about risk/benefits and alternatives of medications, coping 

strategies (safety plan, suicide prevention), relapse prevention, importance of 

follow up with psychiatrist and therapist, stay away from drugs/alcohol/smoking


Medication Change: No (Haldol and Cogentin were increased yesterday)


Medical Record Reviewed: Yes





Goal/Treatment Plan





- Goal/Treatment Plan


Need for Continued Stay: Remain at risks for inpatient hospitalization, Severe 

depression anxiety, Discharge may exacerbated symptoms, Failed transitioning, 

Severe functional impairment


Estimated Date of D/C: 10/17/17

## 2017-10-11 PROCEDURE — 3E0F7GC INTRODUCTION OF OTHER THERAPEUTIC SUBSTANCE INTO RESPIRATORY TRACT, VIA NATURAL OR ARTIFICIAL OPENING: ICD-10-PCS | Performed by: INTERNAL MEDICINE

## 2017-10-11 RX ADMIN — LEVALBUTEROL SCH MG: 0.63 SOLUTION RESPIRATORY (INHALATION) at 14:35

## 2017-10-11 RX ADMIN — PANTOPRAZOLE SODIUM SCH MG: 40 TABLET, DELAYED RELEASE ORAL at 09:22

## 2017-10-11 RX ADMIN — DIPHENHYDRAMINE HYDROCHLORIDE PRN MG: 50 INJECTION INTRAMUSCULAR; INTRAVENOUS at 17:20

## 2017-10-11 RX ADMIN — LEVALBUTEROL SCH MG: 0.63 SOLUTION RESPIRATORY (INHALATION) at 10:27

## 2017-10-11 RX ADMIN — LEVALBUTEROL SCH: 0.63 SOLUTION RESPIRATORY (INHALATION) at 22:00

## 2017-10-11 NOTE — PCM.PYCHPN
Psychiatric Progress Note





- Psychiatric Progress Note


Patient seen today, length of contact: 30min


Patient Chief Complaint: 





""Doctor Eda, Doctor Eda,Doctor Eda,Doctor Eda,Doctor Eda,Doctor Eda"


Medical Problems: 





asthma, obesity








Diagnostic Results: 














 10/09/17 09:40 





 10/09/17 09:40 





 Lab Results





10/09/17 09:40: Sodium 140, Potassium 4.0, Chloride 106, Carbon Dioxide 24, 

Anion Gap 14, BUN 12, Creatinine 1.0, Est GFR (African Amer) > 60, Est GFR (Non-

Af Amer) > 60, Random Glucose 117 H, Calcium 9.2, Magnesium 2.0, Total 

Bilirubin 0.3, Direct Bilirubin 0.3, AST 20, ALT 25, Alkaline Phosphatase 79, 

Total Protein 7.3, Albumin 3.9, Globulin 3.4, Albumin/Globulin Ratio 1.1


10/09/17 09:40: WBC 6.2, RBC 4.82, Hgb 13.4, Hct 39.9, MCV 82.8, MCH 27.8, MCHC 

33.6, RDW 14.6 H, Plt Count 315, MPV 9.5, Gran % 51.1, Lymph % (Auto) 36.3 H, 

Mono % (Auto) 7.9 H, Eos % (Auto) 4.5, Baso % (Auto) 0.2, Gran # 3.19, Lymph # 

2.3, Mono # 0.5, Eos # 0.3, Baso # 0.01


10/07/17 11:05: Urine Opiates Screen Negative, Urine Methadone Screen Negative, 

Ur Barbiturates Screen Negative, Ur Phencyclidine Scrn Positive H, Ur 

Amphetamines Screen Negative, U Benzodiazepines Scrn Negative, U Oth Cocaine 

Metabols Negative, U Cannabinoids Screen Negative


10/07/17 11:05: Urine Color Straw, Urine Appearance Slight-cloudy, Urine pH 7.0

, Ur Specific Gravity <= 1.005, Urine Protein Negative, Urine Glucose (UA) 

Negative, Urine Ketones Negative, Urine Blood Negative, Urine Nitrate Negative, 

Urine Bilirubin Negative, Urine Urobilinogen 0.2, Ur Leukocyte Esterase Negative


10/07/17 10:53: Alcohol, Quantitative < 10


10/07/17 10:53: Salicylates < 1 L, Acetaminophen < 10.0 L


10/07/17 10:53: Sodium 142, Potassium 3.8, Chloride 108 H, Carbon Dioxide 26, 

Anion Gap 12, BUN 8, Creatinine 1.0, Est GFR (African Amer) > 60, Est GFR (Non-

Af Amer) > 60, Random Glucose 102, Calcium 9.0, Total Bilirubin 0.3, AST 25, 

ALT 23, Alkaline Phosphatase 106, Total Creatine Kinase 289 H, CK-MB (CK-2) 0.9

, CK-MB (CK-2) % Cancelled, Total Protein 7.2, Albumin 3.8, Globulin 3.4, 

Albumin/Globulin Ratio 1.1


10/07/17 10:53: WBC 6.9, RBC 4.46, Hgb 12.5, Hct 37.0, MCV 83.0, MCH 28.0, MCHC 

33.8, RDW 14.6 H, Plt Count 310, MPV 9.2, Gran % 60.6, Lymph % (Auto) 29.5, 

Mono % (Auto) 6.3 H, Eos % (Auto) 3.3, Baso % (Auto) 0.3, Gran # 4.17, Lymph # 

2.0, Mono # 0.4, Eos # 0.2, Baso # 0.02











Vital Signs











  Temp Pulse Resp BP Pulse Ox


 


 10/09/17 07:00  97.6 F  95 H  19  154/95 H  96


 


 10/08/17 03:00   85   149/85 


 


 10/07/17 22:00  97.7 F  78  20  148/68  99


 


 10/07/17 17:48   78  14  156/99 H  100


 


 10/07/17 16:41   80   174/113 H 


 


 10/07/17 15:59   83  16  174/113 H  98


 


 10/07/17 14:00   83  18  151/101 H  98


 


 10/07/17 12:52   64  18  128/73  95


 


 10/07/17 10:05  98.2 F  86  18  142/87  100














 











Temp Pulse Resp BP Pulse Ox


 


 98.4 F   80   16   113/72   96 


 


 10/10/17 07:03  10/10/17 07:03  10/10/17 07:03  10/10/17 07:03  10/09/17 07:00











 











Temp Pulse Resp BP Pulse Ox


 


 98.4 F   100 H  16   140/99 H  96 


 


 10/10/17 07:03  10/11/17 09:23  10/10/17 07:03  10/11/17 09:23  10/09/17 07:00








DSM 5 Symptoms Update: 


Shortly pt is 36yo AAF with long h/o mental illness, h/o multiple admissions in 

the psychiatric inpatient unit (mostly in [facility] [place], recent admission to [facility] [place] less 

than two weeks ago), pt also has h/o substance use disorder, pt was admitted 

for evaluation and stabilization of disorganized, psychotic behavior, pt was 

found wondering on the streets with no shoes and pants on, pt was disorganized 

and psychotic, pt needed to be medicated in ED as well code [place] was called 

over night in the psych inpatient unit, pt has chronic noncompliance with 

medications and follow up appointments. 





today pt ambulates slowly but steady, still psychotic but not agitated, 1:1 was 

d/c.





pt is very disorganized, needs constant redirection, for example pt tried to 

drink from the cup with the sealed top, and was not able to understand why 

water was not coming out, no option to have a meaningful conversation, pt keep 

repeating "Doctor Eda, Doctor Eda,Doctor Eda,Doctor Eda,Doctor Eda,Doctor 

Eda", then all of a sudden pt started to cry, when was asked why she is crying 

pt said "I am crying because you treating me so well", hospital  

provided patients with bibles, pt was carrying one of them, then pt tried to 

give it to this writer, was crying saying "it is a gift for you". 





as per staff pt's behaviour is disorganized, pt needs constant redirection and 

supervision, tried to put whole cup of yogurt in her mouth, was redirected by 

staff. 





pt was in agreement to change haldol to zyprexa. 





Review of Systems: see Medical consult 





MSE:


Pt deemed to be unreliable historian, pt is disorganized, poor personal hygiene

, good ADLs, psychomotor agitation/retardation, speech was: mumbling, eye 

contact: no eye contact , mood described: "Doctor Eda, Doctor Eda,Doctor Eda,

Doctor Eda,Doctor Eda,Doctor Eda", affect: labile, mood incongruent, thought 

process:disorganized, thought content: pt denied SI/ HI, pt obviously paranoid, 

psychotic, disorganized, internally preoccupied, insight/judgment: impaired, 

impulses are unpredictable. 





Impression:


schizoaffective disorder


PCP abuse


substance induced mood disorder


alcohol use disorder


tobacco abuse





Treatment plan: 


Milieu/structure/supportive therapy


Medical consult appreciated, see medical team note for more detailed info


 consultation for discharge plan and social issues


Med management


patient currently is on one-to-one observation for safety, risk of falls, 

disorganized behavior.


Benztropine 1mg po amhs for EPS


Haldol d/c


zyprexa 2.5mg po tid for psychosis


would consider to start Haldol Dec, pt is noncompliant with meds and f/u appts


Losartan [Cozaar] 50 mg PO DAILY 


Nicotine 7 mg/24 hr [Nicoderm CQ] 1 patch TD DAILY 


Family involvement


Follow up on labs


Will monitor closely


 evaluation for d/c planning


Pt was educated about risk/benefits and alternatives of medications, coping 

strategies (safety plan, suicide prevention), relapse prevention, importance of 

follow up with psychiatrist and therapist, stay away from drugs/alcohol/smoking


Medication Change: Yes (cogentin decresed, zyprexa started, haldol dc/)


Medical Record Reviewed: Yes


Consults ordered or reviewed: 





medical consult appreciated





Goal/Treatment Plan





- Goal/Treatment Plan


Need for Continued Stay: Remain at risks for inpatient hospitalization, Severe 

depression anxiety, Discharge may exacerbated symptoms, Failed transitioning, 

Severe functional impairment


Estimated Date of D/C: 10/17/17

## 2017-10-12 RX ADMIN — LEVALBUTEROL SCH: 0.63 SOLUTION RESPIRATORY (INHALATION) at 03:00

## 2017-10-12 RX ADMIN — PANTOPRAZOLE SODIUM SCH MG: 40 TABLET, DELAYED RELEASE ORAL at 05:41

## 2017-10-12 RX ADMIN — LEVALBUTEROL SCH MG: 0.63 SOLUTION RESPIRATORY (INHALATION) at 13:24

## 2017-10-12 RX ADMIN — LEVALBUTEROL SCH MG: 0.63 SOLUTION RESPIRATORY (INHALATION) at 08:55

## 2017-10-12 RX ADMIN — DIPHENHYDRAMINE HYDROCHLORIDE PRN MG: 50 INJECTION INTRAMUSCULAR; INTRAVENOUS at 01:35

## 2017-10-12 RX ADMIN — LEVALBUTEROL SCH MG: 0.63 SOLUTION RESPIRATORY (INHALATION) at 21:50

## 2017-10-12 RX ADMIN — OLANZAPINE SCH MG: 5 TABLET, ORALLY DISINTEGRATING ORAL at 17:28

## 2017-10-12 NOTE — PN
DATE:  10/11/2017



SUBJECTIVE:  The patient is seen ambulating on 5B.  The patient appears to

be very cheerful.  The patient is smiling and laughing.  The patient was

noticed to be interacting with _____.  The patient is seen ambulating on

the floor.  The patient is interacting with the staff.  Overnight nurse's

notes were reviewed.  The patient's affect was labile, periods of crying,

unable to express herself; insight and judgment impaired and compromised.



PHYSICAL EXAMINATION:

VITAL SIGNS:  T-max afebrile; heart rate 92, 80, 95, 93; blood pressure

154/95, 139 77, 113/72, 162/94 and 140/99; respiration 16; O2 sat 96%.

HEENT AND NECK:  Head examination, normocephalic and atraumatic.  HEENT

examination shows pink conjunctivae.  Anicteric sclerae.  No oropharyngeal

lesion.  No neck rigidity.

CHEST:  Kyphosis.

LUNGS:  Examination shows no rales, crackles or wheezing.

CARDIOVASCULAR:  S1 and S2, regular rhythm.

ABDOMEN:  Morbidly obese.

GENITALIA:  Female.

RECTAL:  Deferred.

EXTREMITIES:  Shows no pitting edema.  No calf tenderness.  No Homans'

sign.

MUSCULOSKELETAL:  Examination shows a body mass index of 42.

NEUROLOGIC:  Cranial nerves II-XII grossly intact.  Gait examination is

independent.

PSYCHIATRIC:  As per Dr. Calixto's note.



DIAGNOSTICS:  From 10/09/2017 were reviewed.  CBC and CMP are within normal

limits.  The patient's CT head was reviewed, which was ordered.



The patient is seen by psychiatrist.  Their recommendations were noted.



IMPRESSION AND PLAN:

1.  Acute psychosis with episodic agitation with disorganized thought and

flat affect.

2.  Schizoaffective disorder.

3.  Active nicotine, alcohol and PCP abuse and dependence.

4.  Substance-induced mood disorder.

5.  Hypertension.

6.  Transient tachycardia, probably secondary to anxiety.

7.  Morbid obesity with elevated body mass index of 42.

8.  Insomnia.

9.  Psychosis with disorganized speech.

10.  Behavioral disorder with agitation.

1.  Acute psychosis with disorganized, tangential speech and thoughts with

gait dysfunction.

2.  Morbid obesity.

3.  Insomnia.

4.  Anxious, restless and agitated state with anxiety.

5.  History of hypertension.

6.  Morbid obesity with elevated body mass index of 42.

7.  Urine drug screen positive for PCP.

8.  Active nicotine, alcohol and drug abuse and dependence.

9.  Paranoia and auditory, visual hallucination.

10.  Poor personal hygiene.

11.  Psychomotor agitation and retardation.

12.  Substance-induced mood disorder.

13.  Schizoaffective disorder.

14.  Nicotine, alcohol PCP use abuse disorder.

15.  History of questionable asthma.

1.  Active psychosis with behavioral disorder.

2.  Active psychosis with behavioral disorder with flat affect,

disorganized speech, and tangential responses.

3.  Morbid obesity.

4.  Hypertension.

5.  Tachycardia.

6.  History of chronic noncompliance.

7.  Active PCP use.

8.  Active alcohol, tobacco abuse, and dependence.

9.  Insomnia.

1.  Questionable acute exacerbation of schizoaffective disorder with

bizarre behavior and behavioral disorder.

2.  Chronic noncompliance and poor compliance with medication.

3.  Active PCP use.

4.  Substance-induced mood disorder.

5.  Alcohol and tobacco use and abuse disorder.

6.  Morbid obesity.

7.  Transient uncontrolled hypertension.

8.  Questionable altered mental status secondary to underlying psychiatric

problem versus medications.

9.  Active nicotine, alcohol, and substance abuse disorder.

10.  Insomnia.

11.  Hypertension.

12.  Hypovitaminosis D.

13.  Active nicotine addiction.



PLAN:  At this time, the patient has been actively managed by Psychiatry. 

The patient's current medications as of today; Ativan 2 mg IM q. 6 hours

p.r.n., Benadryl 50 mg IM q. 6 hours p.r.n., Cogentin 1 mg a.m. and at

bedtime, Cozaar 25 mg daily, Drisdol 50,000 units weekly, Haldol 5 mg IM q.

6 hours p.r.n., nicotine patch 7 mg daily, Protonix 40 mg daily, Tylenol

650 q. 4 hours p.r.n., Xopenex nebulizer 0.63 mg every 6 hours, Zyprexa

started today at 2.5 mg three times a day.  The patient is also started on

Cogentin 1 mg a.m. and at bedtime today.  The patient has been ordered

occupational therapy and physical therapy.  The patient was seen by

physical therapist.  Their recommendation was noted.  Their recommendation

is to continue PT..  The patient was evaluated..  The patient's treatment

goal was gait training, therapeutic exercises, activities, neuromuscular

reeducation, patient/family education, and trial of PT.



Dictated and electronically signed, not read.



Signing off,





__________________________________________

George Davis MD





DD:  10/11/2017 20:35:39

DT:  10/11/2017 20:48:10

Job # [id number]



KATHY

## 2017-10-12 NOTE — PCM.PYCHPN
Psychiatric Progress Note





- Psychiatric Progress Note


Patient seen today, length of contact: 30min


Patient Chief Complaint: 





""Doctor Eda, Doctor Eda,Doctor Eda,Doctor Eda,Doctor Eda,Doctor Eda"


Medical Problems: 





asthma, obesity








Diagnostic Results: 














 10/09/17 09:40 





 10/09/17 09:40 





 Lab Results





10/09/17 09:40: Sodium 140, Potassium 4.0, Chloride 106, Carbon Dioxide 24, 

Anion Gap 14, BUN 12, Creatinine 1.0, Est GFR (African Amer) > 60, Est GFR (Non-

Af Amer) > 60, Random Glucose 117 H, Calcium 9.2, Magnesium 2.0, Total 

Bilirubin 0.3, Direct Bilirubin 0.3, AST 20, ALT 25, Alkaline Phosphatase 79, 

Total Protein 7.3, Albumin 3.9, Globulin 3.4, Albumin/Globulin Ratio 1.1


10/09/17 09:40: WBC 6.2, RBC 4.82, Hgb 13.4, Hct 39.9, MCV 82.8, MCH 27.8, MCHC 

33.6, RDW 14.6 H, Plt Count 315, MPV 9.5, Gran % 51.1, Lymph % (Auto) 36.3 H, 

Mono % (Auto) 7.9 H, Eos % (Auto) 4.5, Baso % (Auto) 0.2, Gran # 3.19, Lymph # 

2.3, Mono # 0.5, Eos # 0.3, Baso # 0.01


10/07/17 11:05: Urine Opiates Screen Negative, Urine Methadone Screen Negative, 

Ur Barbiturates Screen Negative, Ur Phencyclidine Scrn Positive H, Ur 

Amphetamines Screen Negative, U Benzodiazepines Scrn Negative, U Oth Cocaine 

Metabols Negative, U Cannabinoids Screen Negative


10/07/17 11:05: Urine Color Straw, Urine Appearance Slight-cloudy, Urine pH 7.0

, Ur Specific Gravity <= 1.005, Urine Protein Negative, Urine Glucose (UA) 

Negative, Urine Ketones Negative, Urine Blood Negative, Urine Nitrate Negative, 

Urine Bilirubin Negative, Urine Urobilinogen 0.2, Ur Leukocyte Esterase Negative


10/07/17 10:53: Alcohol, Quantitative < 10


10/07/17 10:53: Salicylates < 1 L, Acetaminophen < 10.0 L


10/07/17 10:53: Sodium 142, Potassium 3.8, Chloride 108 H, Carbon Dioxide 26, 

Anion Gap 12, BUN 8, Creatinine 1.0, Est GFR (African Amer) > 60, Est GFR (Non-

Af Amer) > 60, Random Glucose 102, Calcium 9.0, Total Bilirubin 0.3, AST 25, 

ALT 23, Alkaline Phosphatase 106, Total Creatine Kinase 289 H, CK-MB (CK-2) 0.9

, CK-MB (CK-2) % Cancelled, Total Protein 7.2, Albumin 3.8, Globulin 3.4, 

Albumin/Globulin Ratio 1.1


10/07/17 10:53: WBC 6.9, RBC 4.46, Hgb 12.5, Hct 37.0, MCV 83.0, MCH 28.0, MCHC 

33.8, RDW 14.6 H, Plt Count 310, MPV 9.2, Gran % 60.6, Lymph % (Auto) 29.5, 

Mono % (Auto) 6.3 H, Eos % (Auto) 3.3, Baso % (Auto) 0.3, Gran # 4.17, Lymph # 

2.0, Mono # 0.4, Eos # 0.2, Baso # 0.02











Vital Signs











  Temp Pulse Resp BP Pulse Ox


 


 10/09/17 07:00  97.6 F  95 H  19  154/95 H  96


 


 10/08/17 03:00   85   149/85 


 


 10/07/17 22:00  97.7 F  78  20  148/68  99


 


 10/07/17 17:48   78  14  156/99 H  100


 


 10/07/17 16:41   80   174/113 H 


 


 10/07/17 15:59   83  16  174/113 H  98


 


 10/07/17 14:00   83  18  151/101 H  98


 


 10/07/17 12:52   64  18  128/73  95


 


 10/07/17 10:05  98.2 F  86  18  142/87  100














 











Temp Pulse Resp BP Pulse Ox


 


 98.4 F   80   16   113/72   96 


 


 10/10/17 07:03  10/10/17 07:03  10/10/17 07:03  10/10/17 07:03  10/09/17 07:00











 











Temp Pulse Resp BP Pulse Ox


 


 98.4 F   100 H  16   140/99 H  96 


 


 10/10/17 07:03  10/11/17 09:23  10/10/17 07:03  10/11/17 09:23  10/09/17 07:00








DSM 5 Symptoms Update: 





Shortly pt is 36yo AAF with long h/o mental illness, h/o multiple admissions in 

the psychiatric inpatient unit (mostly in [facility] [place], recent admission to [facility] [place] less 

than two weeks ago), pt also has h/o substance use disorder, pt was admitted 

for evaluation and stabilization of disorganized, psychotic behavior, pt was 

found wondering on the streets with no shoes and pants on, pt was disorganized 

and psychotic, pt needed to be medicated in ED as well code [place] was called 

over night in the psych inpatient unit, pt has chronic noncompliance with 

medications and follow up appointments. 





today pt ambulates slowly but steady, still psychotic, overnight patient needed 

to be medicated with Haldol I am.





pt is very disorganized, needs constant redirection, for example pt tried to 

drink from the cup with the sealed top, and was not able to understand why 

water was not coming out, no option to have a meaningful conversation, pt keep 

repeating "Doctor Eda, Doctor Eda,Doctor Eda,Doctor Eda,Doctor Eda,Doctor 

Eda".





as per staff pt's behaviour is disorganized, pt needs constant redirection and 

supervision, tried to put whole cup of yogurt in her mouth, was redirected by 

staff. 





pt was in agreement to change haldol to zyprexa. 





Review of Systems: see Medical consult 





MSE:


Pt deemed to be unreliable historian, pt is disorganized, poor personal hygiene

, good ADLs, psychomotor agitation/retardation, speech was: mumbling, eye 

contact: no eye contact , mood described: "Doctor Eda, Doctor Eda,Doctor Eda,

Doctor Eda,Doctor Eda,Doctor Eda", affect: labile, mood incongruent, thought 

process:disorganized, thought content: pt denied SI/ HI, pt obviously paranoid, 

psychotic, disorganized, internally preoccupied, insight/judgment: impaired, 

impulses are unpredictable. 





Impression:


schizoaffective disorder


PCP abuse


substance induced mood disorder


alcohol use disorder


tobacco abuse





Treatment plan: 


Milieu/structure/supportive therapy


Medical consult appreciated, see medical team note for more detailed info


SW consultation for discharge plan and social issues


Med management


patient currently is on one-to-one observation for safety, risk of falls, 

disorganized behavior.


Benztropine 1mg po amhs for EPS


Haldol d/c


zyprexa 5mg po amhs for psychosis


would consider to start Haldol Dec, pt is noncompliant with meds and f/u appts


Losartan [Cozaar] 50 mg PO DAILY 


Nicotine 7 mg/24 hr [Nicoderm CQ] 1 patch TD DAILY 


Family involvement


Follow up on labs


Will monitor closely


SW evaluation for d/c planning


Pt was educated about risk/benefits and alternatives of medications, coping 

strategies (safety plan, suicide prevention), relapse prevention, importance of 

follow up with psychiatrist and therapist, stay away from drugs/alcohol/smoking


Medication Change: Yes (Zyprexa was increased)


Medical Record Reviewed: Yes


Consults ordered or reviewed: 





medical consult appreciated





Goal/Treatment Plan





- Goal/Treatment Plan


Need for Continued Stay: Remain at risks for inpatient hospitalization, Severe 

depression anxiety, Discharge may exacerbated symptoms, Failed transitioning, 

Severe functional impairment


Estimated Date of D/C: 10/17/17

## 2017-10-13 RX ADMIN — LEVALBUTEROL SCH MG: 0.63 SOLUTION RESPIRATORY (INHALATION) at 20:01

## 2017-10-13 RX ADMIN — OLANZAPINE SCH MG: 5 TABLET, ORALLY DISINTEGRATING ORAL at 20:02

## 2017-10-13 RX ADMIN — LEVALBUTEROL SCH MG: 0.63 SOLUTION RESPIRATORY (INHALATION) at 13:46

## 2017-10-13 RX ADMIN — PANTOPRAZOLE SODIUM SCH MG: 40 TABLET, DELAYED RELEASE ORAL at 05:12

## 2017-10-13 RX ADMIN — LEVALBUTEROL SCH MG: 0.63 SOLUTION RESPIRATORY (INHALATION) at 08:20

## 2017-10-13 RX ADMIN — OLANZAPINE SCH MG: 5 TABLET, ORALLY DISINTEGRATING ORAL at 08:14

## 2017-10-13 RX ADMIN — LEVALBUTEROL SCH: 0.63 SOLUTION RESPIRATORY (INHALATION) at 01:14

## 2017-10-13 NOTE — PCM.PYCHPN
Psychiatric Progress Note





- Psychiatric Progress Note


Patient seen today, length of contact: 30min


Patient Chief Complaint: 





""Doctor Eda, Doctor Eda,Doctor Eda,Doctor Eda,Doctor Eda,Doctor Eda"


Medical Problems: 





asthma, obesity








Diagnostic Results: 














 10/09/17 09:40 





 10/09/17 09:40 





 Lab Results





10/09/17 09:40: Sodium 140, Potassium 4.0, Chloride 106, Carbon Dioxide 24, 

Anion Gap 14, BUN 12, Creatinine 1.0, Est GFR (African Amer) > 60, Est GFR (Non-

Af Amer) > 60, Random Glucose 117 H, Calcium 9.2, Magnesium 2.0, Total 

Bilirubin 0.3, Direct Bilirubin 0.3, AST 20, ALT 25, Alkaline Phosphatase 79, 

Total Protein 7.3, Albumin 3.9, Globulin 3.4, Albumin/Globulin Ratio 1.1


10/09/17 09:40: WBC 6.2, RBC 4.82, Hgb 13.4, Hct 39.9, MCV 82.8, MCH 27.8, MCHC 

33.6, RDW 14.6 H, Plt Count 315, MPV 9.5, Gran % 51.1, Lymph % (Auto) 36.3 H, 

Mono % (Auto) 7.9 H, Eos % (Auto) 4.5, Baso % (Auto) 0.2, Gran # 3.19, Lymph # 

2.3, Mono # 0.5, Eos # 0.3, Baso # 0.01


10/07/17 11:05: Urine Opiates Screen Negative, Urine Methadone Screen Negative, 

Ur Barbiturates Screen Negative, Ur Phencyclidine Scrn Positive H, Ur 

Amphetamines Screen Negative, U Benzodiazepines Scrn Negative, U Oth Cocaine 

Metabols Negative, U Cannabinoids Screen Negative


10/07/17 11:05: Urine Color Straw, Urine Appearance Slight-cloudy, Urine pH 7.0

, Ur Specific Gravity <= 1.005, Urine Protein Negative, Urine Glucose (UA) 

Negative, Urine Ketones Negative, Urine Blood Negative, Urine Nitrate Negative, 

Urine Bilirubin Negative, Urine Urobilinogen 0.2, Ur Leukocyte Esterase Negative


10/07/17 10:53: Alcohol, Quantitative < 10


10/07/17 10:53: Salicylates < 1 L, Acetaminophen < 10.0 L


10/07/17 10:53: Sodium 142, Potassium 3.8, Chloride 108 H, Carbon Dioxide 26, 

Anion Gap 12, BUN 8, Creatinine 1.0, Est GFR (African Amer) > 60, Est GFR (Non-

Af Amer) > 60, Random Glucose 102, Calcium 9.0, Total Bilirubin 0.3, AST 25, 

ALT 23, Alkaline Phosphatase 106, Total Creatine Kinase 289 H, CK-MB (CK-2) 0.9

, CK-MB (CK-2) % Cancelled, Total Protein 7.2, Albumin 3.8, Globulin 3.4, 

Albumin/Globulin Ratio 1.1


10/07/17 10:53: WBC 6.9, RBC 4.46, Hgb 12.5, Hct 37.0, MCV 83.0, MCH 28.0, MCHC 

33.8, RDW 14.6 H, Plt Count 310, MPV 9.2, Gran % 60.6, Lymph % (Auto) 29.5, 

Mono % (Auto) 6.3 H, Eos % (Auto) 3.3, Baso % (Auto) 0.3, Gran # 4.17, Lymph # 

2.0, Mono # 0.4, Eos # 0.2, Baso # 0.02











Vital Signs











  Temp Pulse Resp BP Pulse Ox


 


 10/09/17 07:00  97.6 F  95 H  19  154/95 H  96


 


 10/08/17 03:00   85   149/85 


 


 10/07/17 22:00  97.7 F  78  20  148/68  99


 


 10/07/17 17:48   78  14  156/99 H  100


 


 10/07/17 16:41   80   174/113 H 


 


 10/07/17 15:59   83  16  174/113 H  98


 


 10/07/17 14:00   83  18  151/101 H  98


 


 10/07/17 12:52   64  18  128/73  95


 


 10/07/17 10:05  98.2 F  86  18  142/87  100














 











Temp Pulse Resp BP Pulse Ox


 


 98.4 F   80   16   113/72   96 


 


 10/10/17 07:03  10/10/17 07:03  10/10/17 07:03  10/10/17 07:03  10/09/17 07:00











 











Temp Pulse Resp BP Pulse Ox


 


 98.4 F   100 H  16   140/99 H  96 


 


 10/10/17 07:03  10/11/17 09:23  10/10/17 07:03  10/11/17 09:23  10/09/17 07:00











 











Temp Pulse Resp BP Pulse Ox


 


 98.4 F   83   16   141/92 H  96 


 


 10/10/17 07:03  10/13/17 08:15  10/10/17 07:03  10/13/17 08:15  10/09/17 07:00








DSM 5 Symptoms Update: 





Shortly pt is 36yo AAF with long h/o mental illness, h/o multiple admissions in 

the psychiatric inpatient unit (mostly in [facility] [place], recent admission to [facility] [place] less 

than two weeks ago), pt also has h/o substance use disorder, pt was admitted 

for evaluation and stabilization of disorganized, psychotic behavior, pt was 

found wondering on the streets with no shoes and pants on, pt was disorganized 

and psychotic, pt needed to be medicated in ED as well code [place] was called 

over night in the psych inpatient unit, pt has chronic noncompliance with 

medications and follow up appointments. 





today pt ambulates steady, still psychotic, yesterday overnight patient needed 

to be medicated with Haldol IM. 





pt seems to have some improvement with disorganized behavior and thoughts but 

still pt would repeat the same statement all over and over again.."Doctor Eda, 

Doctor Eda,Doctor Eda,Doctor Eda,Doctor Eda,Doctor Eda"., then would 

mumble something. pt is very disorganized, needs constant redirection, for 

example pt tried to drink from the cup with the sealed top. 





as per staff pt's behaviour is disorganized, pt needs constant redirection and 

supervision, tried to put whole cup of yogurt in her mouth, was redirected by 

staff. 





as per staff pt takes meds, no behavioral incidents. 





Review of Systems: see Medical consult 





MSE:


Pt deemed to be unreliable historian, pt is disorganized, poor personal hygiene

, good ADLs, psychomotor agitation/retardation, speech was: mumbling, eye 

contact: no eye contact , mood described: "Doctor Eda, Doctor Eda,Doctor Eda,

Doctor Eda,Doctor Eda,Doctor Eda", affect: labile, mood incongruent, thought 

process:disorganized, thought content: pt denied SI/ HI, pt obviously paranoid, 

psychotic, disorganized, internally preoccupied, insight/judgment: impaired, 

impulses are unpredictable. 





Impression:


schizoaffective disorder


PCP abuse


substance induced mood disorder


alcohol use disorder


tobacco abuse





Treatment plan: 


Milieu/structure/supportive therapy


Medical consult appreciated, see medical team note for more detailed info


SW consultation for discharge plan and social issues


Med management


patient currently is on one-to-one observation for safety, risk of falls, 

disorganized behavior.


Benztropine 1mg po amhs for EPS


Haldol d/c


zyprexa 5mg po amhs for psychosis


would consider to start Haldol Dec, pt is noncompliant with meds and f/u appts


Losartan [Cozaar] 50 mg PO DAILY 


Nicotine 7 mg/24 hr [Nicoderm CQ] 1 patch TD DAILY 


Family involvement


Follow up on labs


Will monitor closely


 evaluation for d/c planning


Pt was educated about risk/benefits and alternatives of medications, coping 

strategies (safety plan, suicide prevention), relapse prevention, importance of 

follow up with psychiatrist and therapist, stay away from drugs/alcohol/smoking


Medication Change: Yes (Zyprexa was increased)


Medication Change: Yes (Zyprexa was increased yesterday)


Medical Record Reviewed: Yes


Consults ordered or reviewed: 





medical consult appreciated





Goal/Treatment Plan





- Goal/Treatment Plan


Need for Continued Stay: Remain at risks for inpatient hospitalization, Severe 

depression anxiety, Discharge may exacerbated symptoms, Failed transitioning, 

Severe functional impairment


Estimated Date of D/C: 10/17/17

## 2017-10-13 NOTE — PN
DATE:  10/13/2017



SUBJECTIVE:  The patient is seen _____, appears to be very cheerful much

more awake.  Speech is much clear.  The patient is alert, awake,

responsive.  The patient's overnight nurse's notes were reviewed.  The

patient denies any chest pain.  Denies shortness of breath.  Denies nausea,

vomiting, diarrhea, or constipation.  Denies hemoptysis, melena.  Denies

hematochezia.



REVIEW OF SYSTEMS:  A 13-system review was negative and pertinent positive

and negative dictated above.



PHYSICAL EXAMINATION:

VITAL SIGNS:  T-max afebrile, heart rate 78 to 83, blood pressure 134/93,

141/92, respirations 18 to 20, O2 sat is high 90s to 100%.

HEENT:  Head examination, normocephalic, atraumatic.  HEENT examination

shows pink conjunctivae.  Anicteric sclerae.  No oropharyngeal lesion.

NECK:  No neck rigidity.

CHEST: Symmetrical.  Lung examination shows no rales, crackles or wheezing.

CARDIOVASCULAR:  S1, S2, regular rhythm.

ABDOMEN:  Obese, protuberant.

GENITALIA:  Female.

RECTAL:  Examination is deferred.

EXTREMITIES:  Show no pitting edema, questionable chronic swelling of the

lower extremity.

MUSCULOSKELETAL:  Examination shows an elevated body mass index.

NEUROLOGIC:  The patient is responsive, alert, awake, responsive.

PSYCHIATRIC:  Examination as per the psychiatrist's note.

GAIT: Independent.



DIAGNOSTICS:  None.



IMAGING STUDIES:  None.



IMPRESSION:

1.  Acute exacerbation of schizophrenia with psychosis.

2.  Active nicotine, alcohol, polysubstance abuse, and PCP abuse.

3.  Hypertension.

4.  Hypovitaminosis D.

5.  Morbid obesity.

6.  Questionable asthma.

7.  Psychosis.

8.  History of poor compliance and noncompliance with medication.

9.  Questionable behavioral disorder.

10.  Insomnia.

1.  Acute exacerbation of schizophrenia and psychosis.

2.  Questionable and possible behavioral disorder.

3.  Noncompliance and poor compliance.

4.  Super morbid obesity.

5.  Active nicotine, alcohol and drug abuse and PCP abuse and dependence.

6.  Noncompliance.

7.  Hypertension.

8.  Tachycardia.

9.  History of asthma.

10.  Hypovitaminosis D.

17.  Bilateral lower extremity trace venous stasis.

18.  Insomnia.

19.  Hypertension.

1.  Acute psychosis with episodic agitation with disorganized thought and

flat affect.

2.  Schizoaffective disorder.

3.  Active nicotine, alcohol and PCP abuse and dependence.

4.  Substance-induced mood disorder.

5.  Hypertension.

6.  Transient tachycardia, probably secondary to anxiety.

7.  Morbid obesity with elevated body mass index of 42.

8.  Insomnia.

9.  Psychosis with disorganized speech.

10.  Behavioral disorder with agitation.

1.  Acute psychosis with disorganized, tangential speech and thoughts with

gait dysfunction.

2.  Morbid obesity.

3.  Insomnia.

4.  Anxious, restless and agitated state with anxiety.

5.  History of hypertension.

6.  Morbid obesity with elevated body mass index of 42.

7.  Urine drug screen positive for PCP.

8.  Active nicotine, alcohol and drug abuse and dependence.

9.  Paranoia and auditory, visual hallucination.

10.  Poor personal hygiene.

11.  Psychomotor agitation and retardation.

12.  Substance-induced mood disorder.

13.  Schizoaffective disorder.

14.  Nicotine, alcohol PCP use abuse disorder.

15.  History of questionable asthma.

1.  Active psychosis with behavioral disorder.

2.  Active psychosis with behavioral disorder with flat affect,

disorganized speech, and tangential responses.

3.  Morbid obesity.

4.  Hypertension.

5.  Tachycardia.

6.  History of chronic noncompliance.

7.  Active PCP use.

8.  Active alcohol, tobacco abuse, and dependence.

9.  Insomnia.

1.  Questionable acute exacerbation of schizoaffective disorder with

bizarre behavior and behavioral disorder.

2.  Chronic noncompliance and poor compliance with medication.

3.  Active PCP use.

4.  Substance-induced mood disorder.

5.  Alcohol and tobacco use and abuse disorder.

6.  Morbid obesity.

7.  Transient uncontrolled hypertension.

8.  Questionable altered mental status secondary to underlying psychiatric

problem versus medications.

9.  Active nicotine, alcohol, and substance abuse disorder.

10.  Insomnia.

11.  Hypertension.

12.  Hypovitaminosis D.

13.  Active nicotine addiction.





PLAN:  At this time, the patient is to be continued on the therapeutic

intervention as per the MAR and the medications ordered.  The patient has

also been ordered physical therapy, ambulation therapy, gait training.



At present, the patient is medically stable from my perspective.  The

patient needs to be further managed by psychiatrist.  The patient can be

followed up as an outpatient upon discharge with her medical doctor, Dr. Lozano ______.  The patient was emphasized and educated about continuation

of the medications and compliance with medications.  The patient was

advised weight loss.  The patient was counseled about cessation of smoking,

alcohol, and recreational drug use.



Dictated and electronically signed. not read.





__________________________________________

George Davis MD





DD:  10/13/2017 17:54:30

DT:  10/13/2017 17:56:45

Job # [id number]

KATHY

## 2017-10-13 NOTE — PN
DATE:  10/12/2017



SUBJECTIVE:  The patient is seen ambulating on the floor.  The patient is

drinking water.  The patient is pleasant.  The patient's speech is clearer

today.  The patient appears to be less groggy and less sedated.  The

patient is alert, awake, responsive.  Follows command.  Ambulating

independently.



OBJECTIVE:

VITAL SIGNS:  Reviewed.  The patient's T-max is afebrile.  Heart rate

around 90, 94, 96.  Blood pressure was slightly elevated, systolic blood

pressure was around 140s and diastolic was in high 80s and 90s and morning

blood pressure was also reviewed.  Respirations 18-20.  O2 sat was within

normal limits.

HEAD:  The patient's head examination is normocephalic, atraumatic.

EENT:  Examination shows pink conjunctivae.  Anicteric sclerae.  No

oropharyngeal lesion.  No neck rigidity.

CHEST:  Kyphosis.

LUNGS:  Shows no rales, crackles or wheezing.

CARDIOVASCULAR:  S1, S2, regular rhythm.

ABDOMEN:  Obese.  Positive bowel sounds.

GENITALIA:  Female.

RECTAL:  Deferred.

EXTREMITIES:  Shows nonpitting swelling of the legs.  No calf tenderness. 

No Cuco's signs.

MUSCULOSKELETAL:  Shows an elevated body mass index.

NEUROLOGIC:  The patient is much more alert, awake, responsive.  The

patient appears to be pleasant and answering questions.



DIAGNOSTIC DATA:  None from today.



IMPRESSION:

1.  Acute exacerbation of schizophrenia and psychosis.

2.  Questionable and possible behavioral disorder.

3.  Noncompliance and poor compliance.

4.  Super morbid obesity.

5.  Active nicotine, alcohol and drug abuse and PCP abuse and dependence.

6.  Noncompliance.

7.  Hypertension.

8.  Tachycardia.

9.  History of asthma.

10.  Hypovitaminosis D.

17.  Bilateral lower extremity trace venous stasis.

18.  Insomnia.

19.  Hypertension.

1.  Acute psychosis with episodic agitation with disorganized thought and

flat affect.

2.  Schizoaffective disorder.

3.  Active nicotine, alcohol and PCP abuse and dependence.

4.  Substance-induced mood disorder.

5.  Hypertension.

6.  Transient tachycardia, probably secondary to anxiety.

7.  Morbid obesity with elevated body mass index of 42.

8.  Insomnia.

9.  Psychosis with disorganized speech.

10.  Behavioral disorder with agitation.

1.  Acute psychosis with disorganized, tangential speech and thoughts with

gait dysfunction.

2.  Morbid obesity.

3.  Insomnia.

4.  Anxious, restless and agitated state with anxiety.

5.  History of hypertension.

6.  Morbid obesity with elevated body mass index of 42.

7.  Urine drug screen positive for PCP.

8.  Active nicotine, alcohol and drug abuse and dependence.

9.  Paranoia and auditory, visual hallucination.

10.  Poor personal hygiene.

11.  Psychomotor agitation and retardation.

12.  Substance-induced mood disorder.

13.  Schizoaffective disorder.

14.  Nicotine, alcohol PCP use abuse disorder.

15.  History of questionable asthma.

1.  Active psychosis with behavioral disorder.

2.  Active psychosis with behavioral disorder with flat affect,

disorganized speech, and tangential responses.

3.  Morbid obesity.

4.  Hypertension.

5.  Tachycardia.

6.  History of chronic noncompliance.

7.  Active PCP use.

8.  Active alcohol, tobacco abuse, and dependence.

9.  Insomnia.

1.  Questionable acute exacerbation of schizoaffective disorder with

bizarre behavior and behavioral disorder.

2.  Chronic noncompliance and poor compliance with medication.

3.  Active PCP use.

4.  Substance-induced mood disorder.

5.  Alcohol and tobacco use and abuse disorder.

6.  Morbid obesity.

7.  Transient uncontrolled hypertension.

8.  Questionable altered mental status secondary to underlying psychiatric

problem versus medications.

9.  Active nicotine, alcohol, and substance abuse disorder.

10.  Insomnia.

11.  Hypertension.

12.  Hypovitaminosis D.

13.  Active nicotine addiction.





PLAN:  At this time, the patient is to be managed as per Psychiatry.  The

patient's blood pressure may be elevated secondary to underlying patient's

psychiatric issues and possible behavioral disorder.  At present, the

patient will be followed closely with monitoring of the patient's medical

issues and hemodynamic status.  Upon discharge, the patient needs close

medical followup with the patient's primary care physician, Dr. Blake Justin.  He will need close psychiatric followup upon discharge.



Dictated and electronically signed, not read.





__________________________________________

George Davis MD





DD:  10/12/2017 18:23:23

DT:  10/12/2017 18:28:11

Job # [id number]





MTDD

## 2017-10-14 RX ADMIN — PANTOPRAZOLE SODIUM SCH MG: 40 TABLET, DELAYED RELEASE ORAL at 06:13

## 2017-10-14 RX ADMIN — LEVALBUTEROL SCH MG: 0.63 SOLUTION RESPIRATORY (INHALATION) at 09:50

## 2017-10-14 RX ADMIN — OLANZAPINE SCH MG: 5 TABLET, ORALLY DISINTEGRATING ORAL at 08:11

## 2017-10-14 RX ADMIN — LEVALBUTEROL SCH MG: 0.63 SOLUTION RESPIRATORY (INHALATION) at 21:50

## 2017-10-14 RX ADMIN — LEVALBUTEROL SCH MG: 0.63 SOLUTION RESPIRATORY (INHALATION) at 14:33

## 2017-10-14 RX ADMIN — OLANZAPINE SCH MG: 5 TABLET, ORALLY DISINTEGRATING ORAL at 18:29

## 2017-10-14 RX ADMIN — LEVALBUTEROL SCH: 0.63 SOLUTION RESPIRATORY (INHALATION) at 01:19

## 2017-10-14 NOTE — PCM.PYCHPN
Psychiatric Progress Note





- Psychiatric Progress Note


Patient seen today, length of contact: 25 min


Patient Chief Complaint: 


"happy" and that she "prays to god every morning for a chance to reinvent 

herself daily"





Problems Identified/Issues Discussed: 


I reviewed recent notes and met with patient in the dayroom. She is well 

groomed and quite pleasant. Oriented to location and year however indicated 

that the month was still September. Speech is rushed at times. Reports her mood 

is "happy" and that she "prays to god every morning for a chance to reinvent 

herself daily". Patient is talkative and rambles but can be redirected.  

Thought process is tangential and overinclusive. Affect is a little labile 

however she is in good control. Denies AVH, SI or HI. Patient denies any new 

discomfort or pain and indicates she has been sleeping well. She has been 

tolerating medications and denies side effects. 





Nursing notes indicate that patient has been elevated and talkative. She is 

becoming more organized but still rambles (which this provider agrees with). 

Can be loud and disruptive at times. There were no behavioral issues overnight. 





Diagnostic Results: 


schizoaffective disorder


PCP abuse


substance induced mood disorder


alcohol use disorder


tobacco abuse








Medication Change: No ( )


Medical Record Reviewed: Yes





Mental Status Examination





- Cognitive Function


Attention: Poor


Concentration: Poor


Association: Loose





- Mood


Mood: Other ("happy" and that she "prays to god every morning for a chance to 

reinvent herself daily")





- Affect


Affect: Broad





- Speech


Speech: Appropriate





- Formal Thought Process


Formal Thought Process: Loosening of associations





- Suicidal Ideation


Suicidal Ideation: No





- Homicidal Ideation


Homicidal Ideation: No





Goal/Treatment Plan





- Goal/Treatment Plan


Need for Continued Stay: Remain at risks for inpatient hospitalization, Severe 

depression anxiety, Discharge may exacerbated symptoms, Failed transitioning, 

Severe functional impairment


Progress Toward Problem(s) and Goals/Treatment Plan: 





* c/w current tx and plan


* No new weekend labs thus far


* Vitals reviewed and noted below:


 Selected Entries











  10/10/17 10/10/17 10/11/17





  07:03 16:00 09:23


 


Temperature 98.4 F  


 


Pulse Rate 80 93 H 100 H


 


Respiratory 16  





Rate   


 


Blood Pressure 113/72 162/94 H 140/99 H














  10/11/17 10/12/17 10/12/17





  15:00 09:56 16:35


 


Temperature   


 


Pulse Rate  78 87


 


Respiratory   





Rate   


 


Blood Pressure 120/77 138/82 134/93 H














  10/13/17





  08:15


 


Temperature 


 


Pulse Rate 83


 


Respiratory 





Rate 


 


Blood Pressure 141/92 H








Estimated Date of D/C: 10/17/17

## 2017-10-15 RX ADMIN — OLANZAPINE SCH MG: 5 TABLET, ORALLY DISINTEGRATING ORAL at 08:47

## 2017-10-15 RX ADMIN — OLANZAPINE SCH MG: 5 TABLET, ORALLY DISINTEGRATING ORAL at 15:57

## 2017-10-15 RX ADMIN — LEVALBUTEROL SCH MG: 0.63 SOLUTION RESPIRATORY (INHALATION) at 14:35

## 2017-10-15 RX ADMIN — LEVALBUTEROL SCH: 0.63 SOLUTION RESPIRATORY (INHALATION) at 03:00

## 2017-10-15 RX ADMIN — LEVALBUTEROL SCH MG: 0.63 SOLUTION RESPIRATORY (INHALATION) at 22:15

## 2017-10-15 RX ADMIN — PANTOPRAZOLE SODIUM SCH MG: 40 TABLET, DELAYED RELEASE ORAL at 07:04

## 2017-10-15 RX ADMIN — ERGOCALCIFEROL SCH CAP: 1.25 CAPSULE ORAL at 09:11

## 2017-10-15 RX ADMIN — LEVALBUTEROL SCH MG: 0.63 SOLUTION RESPIRATORY (INHALATION) at 09:53

## 2017-10-15 NOTE — PN
DATE: 10/15/2017   



SUBJECTIVE:  The patient is seen in the day room with activity group.  The

patient is sitting up in the chair.  The patient is alert, awake,

responsive.  The patient appears to be very cheerful and happy.  The

patient's nurses# notes from overnight were noted.  The patient is

complaining of tooth problem, which has been going on for a awhile and the

patient states that she has an appointment with the dentist tomorrow and

wants to be discharged.  I have advised the patient to speak to the

psychiatrist regarding her discharge, so she can follow up with the

dentist.  The patient denies chest pain.  Denies shortness of breath. 

Denies wheezing.  Denies nausea, denies vomiting, diarrhea or constipation.

The patient states that she has slept well.



PHYSICAL EXAMINATION:

VITAL SIGNS:  T-max afebrile, heart rate 84, heart rate has slowed down. 

The patient's blood pressure has also improved to 120/81 in the last 24

hours since the patient's Cozaar was increased.



DIAGNOSTIC DATA:  None from today.



The patient was seen by the [facility] psychiatrist, Dr. Chen.



IMPRESSION:

1.  Chronic tooth problem and probably poor dental hygiene with dental

caries.

2.  Acute exacerbation of schizoaffective disorder.

3.  Hypertension.

4.  Tachycardia.

5.  Morbid obesity with elevated body mass index.

6.  History of poor compliance and noncompliance.

7.  History of behavioral disorder.

8.  Active nicotine, alcohol, and recreational drug use and dependence, and

history of PCP use.

9.  Bilateral nonpitting lower extremity venous stasis.

10.  History of questionable chronic obstructive pulmonary disease versus

asthma.

11.  Hypovitaminosis D.

1.  Acute exacerbation of schizoaffective disorder with psychosis and

behavioral disturbances.

2.  Active nicotine, alcohol, PCP, polysubstance abuse and dependence with

substance-induced mood disorder.

3.  Morbid obesity.

4.  Hypertension.

5.  Asthma.

6.  Nicotine dependence.

7.  Transient tachycardia.

8.  Obesity with elevated body mass index of greater than 48.

9.  Hypertensive cardiovascular disease.

1.  Acute exacerbation of schizophrenia with psychosis.

2.  Active nicotine, alcohol, polysubstance abuse, and PCP abuse.

3.  Hypertension.

4.  Hypovitaminosis D.

5.  Morbid obesity.

6.  Questionable asthma.

7.  Psychosis.

8.  History of poor compliance and noncompliance with medication.

9.  Questionable behavioral disorder.

10.  Insomnia.

1.  Acute exacerbation of schizophrenia and psychosis.

2.  Questionable and possible behavioral disorder.

3.  Noncompliance and poor compliance.

4.  Super morbid obesity.

5.  Active nicotine, alcohol and drug abuse and PCP abuse and dependence.

6.  Noncompliance.

7.  Hypertension.

8.  Tachycardia.

9.  History of asthma.

10.  Hypovitaminosis D.

17.  Bilateral lower extremity trace venous stasis.

18.  Insomnia.

19.  Hypertension.

1.  Acute psychosis with episodic agitation with disorganized thought and

flat affect.

2.  Schizoaffective disorder.

3.  Active nicotine, alcohol and PCP abuse and dependence.

4.  Substance-induced mood disorder.

5.  Hypertension.

6.  Transient tachycardia, probably secondary to anxiety.

7.  Morbid obesity with elevated body mass index of 42.

8.  Insomnia.

9.  Psychosis with disorganized speech.

10.  Behavioral disorder with agitation.

1.  Acute psychosis with disorganized, tangential speech and thoughts with

gait dysfunction.

2.  Morbid obesity.

3.  Insomnia.

4.  Anxious, restless and agitated state with anxiety.

5.  History of hypertension.

6.  Morbid obesity with elevated body mass index of 42.

7.  Urine drug screen positive for PCP.

8.  Active nicotine, alcohol and drug abuse and dependence.

9.  Paranoia and auditory, visual hallucination.

10.  Poor personal hygiene.

11.  Psychomotor agitation and retardation.

12.  Substance-induced mood disorder.

13.  Schizoaffective disorder.

14.  Nicotine, alcohol PCP use abuse disorder.

15.  History of questionable asthma.

1.  Active psychosis with behavioral disorder.

2.  Active psychosis with behavioral disorder with flat affect,

disorganized speech, and tangential responses.

3.  Morbid obesity.

4.  Hypertension.

5.  Tachycardia.

6.  History of chronic noncompliance.

7.  Active PCP use.

8.  Active alcohol, tobacco abuse, and dependence.

9.  Insomnia.

1.  Questionable acute exacerbation of schizoaffective disorder with

bizarre behavior and behavioral disorder.

2.  Chronic noncompliance and poor compliance with medication.

3.  Active PCP use.

4.  Substance-induced mood disorder.

5.  Alcohol and tobacco use and abuse disorder.

6.  Morbid obesity.

7.  Transient uncontrolled hypertension.

8.  Questionable altered mental status secondary to underlying psychiatric

problem versus medications.

9.  Active nicotine, alcohol, and substance abuse disorder.

10.  Insomnia.

11.  Hypertension.

12.  Hypovitaminosis D.

13.  Active nicotine addiction.





PLAN:  At this time, the patient wishes to be discharged in a.m. for a

follow up with the dentist regarding her dental problems.



Patient is a medically stable for discharge from our perspective as long as

the patient is cleared by Psychiatry.  The patient has been advised

immediate and close followup with the patient's primary care physician and

psychiatrist right away upon discharge, which she acknowledged to

understand.  The patient has been also advised and counseled about

cessation of smoking, alcohol and recreational drug use.  The patient has

been advised weight loss, exercise.  The patient has also been advised

outpatient GYN followup in addition to above.



Dictated and electronically signed, not read.



__________________________________________

George Davis MD



DD:  10/15/2017 15:07:03

DT:  10/15/2017 15:11:21

Job # [id number]





MTDD

## 2017-10-15 NOTE — PCM.PYCHPN
Psychiatric Progress Note





- Psychiatric Progress Note


Patient seen today, length of contact: 25 min


Patient Chief Complaint: 


"feeling good"


Problems Identified/Issues Discussed: 


 I reviewed recent notes and met with patient at bedside. She is well groomed 

and remains quite pleasant. Oriented to location, month and year today. Speech 

is still rushed at times. Reports that her mood is "feeling good". Patient is 

talkative and overinclusive but can be redirected.  Thought process remains a 

little tangential. Affect is labile however she is generally happy and in good 

control. Denies AVH, SI or HI. Patient denies any new discomfort or pain and 

indicates she has been sleeping well. She continues to tolerate her medications 

and denies side effects. 





Nursing notes indicate that patient has been elevated and talkative. She is 

improving. She is becoming more organized and less pressured but still rambles. 

Can be loud and disruptive at times. There were no major behavioral issues over 

the weekend.  





Diagnostic Results: 


schizoaffective disorder


PCP abuse


substance induced mood disorder


alcohol use disorder


tobacco abuse








Medication Change: No ( )


Medical Record Reviewed: Yes





Mental Status Examination





- Cognitive Function


Orientation: Person, Place


Attention: Poor


Concentration: Poor


Association: Loose





- Mood


Mood: Other ( "feeling good")





- Affect


Affect: Broad





- Speech


Speech: Appropriate (rushed but less pressured)





- Formal Thought Process


Formal Thought Process: Loosening of associations (overinclusive)





- Suicidal Ideation


Suicidal Ideation: No





- Homicidal Ideation


Homicidal Ideation: No





Goal/Treatment Plan





- Goal/Treatment Plan


Need for Continued Stay: Remain at risks for inpatient hospitalization, Severe 

depression anxiety, Discharge may exacerbated symptoms, Failed transitioning, 

Severe functional impairment


Progress Toward Problem(s) and Goals/Treatment Plan: 





* c/w current tx and plan


* No new weekend labs thus far


* Vitals reviewed and noted below: 


 Selected Entries











  10/14/17 10/14/17 10/14/17





  06:53 06:59 17:28


 


Temperature 98.3 F  


 


Pulse Rate 93 H 92 H 102 H


 


Respiratory 20  





Rate   


 


Blood Pressure 147/101 H 147/101 H 137/84


 


O2 Sat by Pulse 96  





Oximetry   


 


Oxygen Delivery Room Air  





Method   














  10/14/17





  18:10


 


Temperature 


 


Pulse Rate 102 H


 


Respiratory 





Rate 


 


Blood Pressure 137/84


 


O2 Sat by Pulse 





Oximetry 


 


Oxygen Delivery 





Method 








Estimated Date of D/C: 10/17/17

## 2017-10-16 RX ADMIN — PANTOPRAZOLE SODIUM SCH MG: 40 TABLET, DELAYED RELEASE ORAL at 05:25

## 2017-10-16 RX ADMIN — LEVALBUTEROL SCH MG: 0.63 SOLUTION RESPIRATORY (INHALATION) at 08:33

## 2017-10-16 RX ADMIN — OLANZAPINE SCH MG: 5 TABLET, ORALLY DISINTEGRATING ORAL at 07:57

## 2017-10-16 RX ADMIN — LEVALBUTEROL SCH MG: 0.63 SOLUTION RESPIRATORY (INHALATION) at 14:16

## 2017-10-16 RX ADMIN — LEVALBUTEROL SCH MG: 0.63 SOLUTION RESPIRATORY (INHALATION) at 20:29

## 2017-10-16 RX ADMIN — DIVALPROEX SODIUM SCH MG: 250 TABLET, DELAYED RELEASE ORAL at 17:37

## 2017-10-16 RX ADMIN — DIVALPROEX SODIUM SCH: 250 TABLET, DELAYED RELEASE ORAL at 17:58

## 2017-10-16 RX ADMIN — OLANZAPINE SCH MG: 5 TABLET, ORALLY DISINTEGRATING ORAL at 17:37

## 2017-10-16 NOTE — PCM.PYCHPN
Psychiatric Progress Note





- Psychiatric Progress Note


Patient seen today, length of contact: 30min


Patient Chief Complaint: 





"I don't know, I have a tooth infection, look at my toe it is infected too, I 

most likely would need my leg amputated..." (pt seems to be disorganized, pt 

has just fungal infection of toe and ingrown toenail


Medical Problems: 





asthma, obesity








Diagnostic Results: 














 10/09/17 09:40 





 10/09/17 09:40 





 Lab Results





10/09/17 09:40: Sodium 140, Potassium 4.0, Chloride 106, Carbon Dioxide 24, 

Anion Gap 14, BUN 12, Creatinine 1.0, Est GFR (African Amer) > 60, Est GFR (Non-

Af Amer) > 60, Random Glucose 117 H, Calcium 9.2, Magnesium 2.0, Total 

Bilirubin 0.3, Direct Bilirubin 0.3, AST 20, ALT 25, Alkaline Phosphatase 79, 

Total Protein 7.3, Albumin 3.9, Globulin 3.4, Albumin/Globulin Ratio 1.1


10/09/17 09:40: WBC 6.2, RBC 4.82, Hgb 13.4, Hct 39.9, MCV 82.8, MCH 27.8, MCHC 

33.6, RDW 14.6 H, Plt Count 315, MPV 9.5, Gran % 51.1, Lymph % (Auto) 36.3 H, 

Mono % (Auto) 7.9 H, Eos % (Auto) 4.5, Baso % (Auto) 0.2, Gran # 3.19, Lymph # 

2.3, Mono # 0.5, Eos # 0.3, Baso # 0.01


10/07/17 11:05: Urine Opiates Screen Negative, Urine Methadone Screen Negative, 

Ur Barbiturates Screen Negative, Ur Phencyclidine Scrn Positive H, Ur 

Amphetamines Screen Negative, U Benzodiazepines Scrn Negative, U Oth Cocaine 

Metabols Negative, U Cannabinoids Screen Negative


10/07/17 11:05: Urine Color Straw, Urine Appearance Slight-cloudy, Urine pH 7.0

, Ur Specific Gravity <= 1.005, Urine Protein Negative, Urine Glucose (UA) 

Negative, Urine Ketones Negative, Urine Blood Negative, Urine Nitrate Negative, 

Urine Bilirubin Negative, Urine Urobilinogen 0.2, Ur Leukocyte Esterase Negative


10/07/17 10:53: Alcohol, Quantitative < 10


10/07/17 10:53: Salicylates < 1 L, Acetaminophen < 10.0 L


10/07/17 10:53: Sodium 142, Potassium 3.8, Chloride 108 H, Carbon Dioxide 26, 

Anion Gap 12, BUN 8, Creatinine 1.0, Est GFR (African Amer) > 60, Est GFR (Non-

Af Amer) > 60, Random Glucose 102, Calcium 9.0, Total Bilirubin 0.3, AST 25, 

ALT 23, Alkaline Phosphatase 106, Total Creatine Kinase 289 H, CK-MB (CK-2) 0.9

, CK-MB (CK-2) % Cancelled, Total Protein 7.2, Albumin 3.8, Globulin 3.4, 

Albumin/Globulin Ratio 1.1


10/07/17 10:53: WBC 6.9, RBC 4.46, Hgb 12.5, Hct 37.0, MCV 83.0, MCH 28.0, MCHC 

33.8, RDW 14.6 H, Plt Count 310, MPV 9.2, Gran % 60.6, Lymph % (Auto) 29.5, 

Mono % (Auto) 6.3 H, Eos % (Auto) 3.3, Baso % (Auto) 0.3, Gran # 4.17, Lymph # 

2.0, Mono # 0.4, Eos # 0.2, Baso # 0.02











Vital Signs











  Temp Pulse Resp BP Pulse Ox


 


 10/09/17 07:00  97.6 F  95 H  19  154/95 H  96


 


 10/08/17 03:00   85   149/85 


 


 10/07/17 22:00  97.7 F  78  20  148/68  99


 


 10/07/17 17:48   78  14  156/99 H  100


 


 10/07/17 16:41   80   174/113 H 


 


 10/07/17 15:59   83  16  174/113 H  98


 


 10/07/17 14:00   83  18  151/101 H  98


 


 10/07/17 12:52   64  18  128/73  95


 


 10/07/17 10:05  98.2 F  86  18  142/87  100














 











Temp Pulse Resp BP Pulse Ox


 


 98.4 F   80   16   113/72   96 


 


 10/10/17 07:03  10/10/17 07:03  10/10/17 07:03  10/10/17 07:03  10/09/17 07:00











 











Temp Pulse Resp BP Pulse Ox


 


 98.4 F   100 H  16   140/99 H  96 


 


 10/10/17 07:03  10/11/17 09:23  10/10/17 07:03  10/11/17 09:23  10/09/17 07:00











 











Temp Pulse Resp BP Pulse Ox


 


 98.4 F   83   16   141/92 H  96 


 


 10/10/17 07:03  10/13/17 08:15  10/10/17 07:03  10/13/17 08:15  10/09/17 07:00








DSM 5 Symptoms Update: 


Shortly pt is 36yo AAF with long h/o mental illness, h/o multiple admissions in 

the psychiatric inpatient unit ([place] [facility] [place], [place] [facility] [place] 

[place] two weeks ago), pt also has h/o substance use disorder, pt was admitted 

for evaluation and stabilization of disorganized, psychotic behavior, pt was 

found wondering on the streets with no shoes and pants on, pt was disorganized 

and psychotic, pt needed to be medicated in ED as well code Donaldo was called 

over night in the psych inpatient unit, pt has chronic noncompliance with 

medications and follow up appointments. 





today pt ambulates steady, still psychotic, patient seems to be in her manic 

stage right now, pressured speech, thought process is disorganized, 

circumstantial and tangential.





affect is very labile, patient is not able to participate in treatment groups.





as per staff pt takes meds, no behavioral incidents. 





Review of Systems: see Medical consult 





MSE:


Pt deemed to be unreliable historian, pt is disorganized, poor personal hygiene

, good ADLs, psychomotor agitation/retardation, speech was: mumbling, eye 

contact: no eye contact , mood described: "I don't know, I have a tooth 

infection, look at my toe it is infected too, I most likely would need my leg 

amputated..." (pt seems to be disorganized, pt has just fungal infection of toe 

and ingrown toenail, affect: labile, mood incongruent, thought process:

disorganized, thought content: pt denied SI/ HI, pt obviously paranoid, 

psychotic, disorganized, internally preoccupied, insight/judgment: impaired, 

impulses are unpredictable. 





Impression:


schizoaffective disorder


PCP abuse


substance induced mood disorder


alcohol use disorder


tobacco abuse





Treatment plan: 


Milieu/structure/supportive therapy


Medical consult appreciated, see medical team note for more detailed info


 consultation for discharge plan and social issues


Med management


patient currently is on one-to-one observation for safety, risk of falls, 

disorganized behavior.


Benztropine 1mg po amhs for EPS


Haldol d/c


zyprexa 7.5mg po amhs for psychosis





would consider to start Haldol Dec, pt is noncompliant with meds and f/u appts


Losartan [Cozaar] 50 mg PO DAILY 


Nicotine 7 mg/24 hr [Nicoderm CQ] 1 patch TD DAILY 


Family involvement


Follow up on labs


Will monitor closely


 evaluation for d/c planning


Pt was educated about risk/benefits and alternatives of medications, coping 

strategies (safety plan, suicide prevention), relapse prevention, importance of 

follow up with psychiatrist and therapist, stay away from drugs/alcohol/

smokingshe is I didn't see  was ordering any antibiotics starting as she 

wanted to see diathesis as possible


Medication Change: Yes (zyprexa increased, depakote started)


Medical Record Reviewed: Yes


Consults ordered or reviewed: 





medical consult appreciated





Mental Status Examination





- Cognitive Function


Orientation: Person, Place


Attention: Poor


Concentration: Poor


Association: Loose





- Mood


Mood: Other ( "feeling good")





- Affect


Affect: Broad





- Speech


Speech: Appropriate (rushed but less pressured)





- Formal Thought Process


Formal Thought Process: Loosening of associations (overinclusive)





- Suicidal Ideation


Suicidal Ideation: No





- Homicidal Ideation


Homicidal Ideation: No





Goal/Treatment Plan





- Goal/Treatment Plan


Need for Continued Stay: Remain at risks for inpatient hospitalization, Severe 

depression anxiety, Discharge may exacerbated symptoms, Failed transitioning, 

Severe functional impairment


Estimated Date of D/C: 10/20/17

## 2017-10-16 NOTE — PN
DATE:  10/14/2017



SUBJECTIVE:  The patient is seen sitting up in the dayroom on Psychiatry

floor, getting a nebulizer medication.  The patient is out of bed to chair.

The patient is alert, awake, responsive.  Overnight nurse's notes were

reviewed.  The patient slept well.  The patient was compliant with good

appetite.  The patient's behavior was cooperative.  Mood is calm.  Affect

congruent.   The patient still has rambling speech.  The patient was found

to be alert, awake, oriented x3.  The patient is improved with the thought

process.  The patient is more compliant attending group.



OBJECTIVE:

VITAL SIGNS:  T-max 98.3, heart rate 83-92, blood pressure 147, 101, 141,

92, respiration 20, O2 sat 96%.  The patient's last 24-48 hours blood

pressure readings were reviewed.

HEAD:  Examination normocephalic, atraumatic.

EENT:  Examination shows pink conjunctivae.  Anicteric sclerae.  No

oropharyngeal lesion.  No neck rigidity.

CHEST:  Examination symmetrical.

LUNGS:  Examination shows no rales, crackles or wheezing.

CARDIOVASCULAR:  Examination shows S1, S2, regular rhythm.

ABDOMEN:  Obese.  Positive bowel sounds.

GENITALIA:  Female.

RECTAL:  Examination was deferred.

EXTREMITY:  Shows chronic nonpitting edema of the lower extremity.  Chronic

swelling of the lower extremity.  MUSCULOSKELETAL:  Examination shows a

body mass index of 48.4.  Cranial nerves II-XII intact.  Gait examination

is independent.

VASCULAR:  Examination palpate pulses.

PSYCHIATRIC:  Examination as per psychiatry evaluation.



DIAGNOSTIC DATA:  None.  Microbiology negative.  Blood bank noted.  Reports

reviewed including imaging, cardiovascular, general and psychosocial.



The patient seen by Psychiatry yesterday.  According to the Psychiatry, the

patient is still psychotic.  The patient required intermittent Haldol IM. 

Behavior is disorganized.  Needs constant redirection and supervision.



IMPRESSION:

1.  Acute exacerbation of schizoaffective disorder with psychosis and

behavioral disturbances.

2.  Active nicotine, alcohol, PCP, polysubstance abuse and dependence with

substance-induced mood disorder.

3.  Morbid obesity.

4.  Hypertension.

5.  Asthma.

6.  Nicotine dependence.

7.  Transient tachycardia.

8.  Obesity with elevated body mass index of greater than 48.

9.  Hypertensive cardiovascular disease.

1.  Acute exacerbation of schizophrenia with psychosis.

2.  Active nicotine, alcohol, polysubstance abuse, and PCP abuse.

3.  Hypertension.

4.  Hypovitaminosis D.

5.  Morbid obesity.

6.  Questionable asthma.

7.  Psychosis.

8.  History of poor compliance and noncompliance with medication.

9.  Questionable behavioral disorder.

10.  Insomnia.

1.  Acute exacerbation of schizophrenia and psychosis.

2.  Questionable and possible behavioral disorder.

3.  Noncompliance and poor compliance.

4.  Super morbid obesity.

5.  Active nicotine, alcohol and drug abuse and PCP abuse and dependence.

6.  Noncompliance.

7.  Hypertension.

8.  Tachycardia.

9.  History of asthma.

10.  Hypovitaminosis D.

17.  Bilateral lower extremity trace venous stasis.

18.  Insomnia.

19.  Hypertension.

1.  Acute psychosis with episodic agitation with disorganized thought and

flat affect.

2.  Schizoaffective disorder.

3.  Active nicotine, alcohol and PCP abuse and dependence.

4.  Substance-induced mood disorder.

5.  Hypertension.

6.  Transient tachycardia, probably secondary to anxiety.

7.  Morbid obesity with elevated body mass index of 42.

8.  Insomnia.

9.  Psychosis with disorganized speech.

10.  Behavioral disorder with agitation.

1.  Acute psychosis with disorganized, tangential speech and thoughts with

gait dysfunction.

2.  Morbid obesity.

3.  Insomnia.

4.  Anxious, restless and agitated state with anxiety.

5.  History of hypertension.

6.  Morbid obesity with elevated body mass index of 42.

7.  Urine drug screen positive for PCP.

8.  Active nicotine, alcohol and drug abuse and dependence.

9.  Paranoia and auditory, visual hallucination.

10.  Poor personal hygiene.

11.  Psychomotor agitation and retardation.

12.  Substance-induced mood disorder.

13.  Schizoaffective disorder.

14.  Nicotine, alcohol PCP use abuse disorder.

15.  History of questionable asthma.

1.  Active psychosis with behavioral disorder.

2.  Active psychosis with behavioral disorder with flat affect,

disorganized speech, and tangential responses.

3.  Morbid obesity.

4.  Hypertension.

5.  Tachycardia.

6.  History of chronic noncompliance.

7.  Active PCP use.

8.  Active alcohol, tobacco abuse, and dependence.

9.  Insomnia.

1.  Questionable acute exacerbation of schizoaffective disorder with

bizarre behavior and behavioral disorder.

2.  Chronic noncompliance and poor compliance with medication.

3.  Active PCP use.

4.  Substance-induced mood disorder.

5.  Alcohol and tobacco use and abuse disorder.

6.  Morbid obesity.

7.  Transient uncontrolled hypertension.

8.  Questionable altered mental status secondary to underlying psychiatric

problem versus medications.

9.  Active nicotine, alcohol, and substance abuse disorder.

10.  Insomnia.

11.  Hypertension.

12.  Hypovitaminosis D.

13.  Active nicotine addiction.



PLAN:  At this time, the patient is to be continued on Ativan 2 mg IM q. 6

hours p.r.n., Ativan 2 mg IM q. 6 p.r.n., Benadryl 50 IM q. 6 p.r.n.,

Cogentin 1 mg a.m. and at bedtime.  Cozaar will be increased to 50 mg

daily.  The patient is on Drisdol 50,000 weekly, Haldol 5 mg IM q. 6 hours

p.r.n., nicotine patch 27 mg daily, Protonix 40 mg daily, Tylenol 650 q. 4

p.r.n., Xopenex nebulizer 0.63 mg q. 6 hours, Zyprexa 5 mg twice a day. 

The patient has been ordered out of bed.  RAYMOND stockings, occupational

therapy, physical therapy all ordered.  The patient seen by physical

therapist.  The patient was discharged from PT.  The patient should be

continued on the above therapeutic intervention as per Psychiatry and the

patient will be followed closely with monitoring of the patient's

hemodynamics and blood pressure.



Dictated and electronically signed, not read.





__________________________________________

George Davis MD



DD:  10/14/2017 10:52:16

DT:  10/14/2017 10:59:02

Job # [id number]



MTDD

## 2017-10-17 RX ADMIN — LEVALBUTEROL SCH MG: 0.63 SOLUTION RESPIRATORY (INHALATION) at 20:21

## 2017-10-17 RX ADMIN — LEVALBUTEROL SCH MG: 0.63 SOLUTION RESPIRATORY (INHALATION) at 07:43

## 2017-10-17 RX ADMIN — LEVALBUTEROL SCH MG: 0.63 SOLUTION RESPIRATORY (INHALATION) at 13:17

## 2017-10-17 RX ADMIN — PANTOPRAZOLE SODIUM SCH MG: 40 TABLET, DELAYED RELEASE ORAL at 06:03

## 2017-10-17 RX ADMIN — DIVALPROEX SODIUM SCH MG: 250 TABLET, DELAYED RELEASE ORAL at 09:36

## 2017-10-17 RX ADMIN — DIVALPROEX SODIUM SCH: 250 TABLET, DELAYED RELEASE ORAL at 09:46

## 2017-10-17 RX ADMIN — OLANZAPINE SCH MG: 5 TABLET, ORALLY DISINTEGRATING ORAL at 09:37

## 2017-10-17 RX ADMIN — DIVALPROEX SODIUM SCH: 250 TABLET, DELAYED RELEASE ORAL at 17:54

## 2017-10-17 RX ADMIN — LEVALBUTEROL SCH: 0.63 SOLUTION RESPIRATORY (INHALATION) at 01:46

## 2017-10-17 NOTE — PN
DATE:  10/17/2017



SUBJECTIVE:  The patient is seen in the hallway.  The patient has been in

between talking on the phone.



The patient's nurses' notes were reviewed.  The patient was compliant with

medication but the patient was found to be alert, awake and oriented x3,

anxious mood, affect congruent.  The patient is concerned about her housing

section 8.  Speech is over productive, pressured with grandiose delusions.



PHYSICAL EXAMINATION:

VITAL SIGNS:  T-max 98, heart rate 88, 72, 72, 101, blood pressure 123/83,

139/84, 91/56 which does not appear to be correct, 122/83, respirations 16,

O2 sat 96% to 100%.

HEAD:  Examination normocephalic, atraumatic.

EENT:  Examination shows pink conjunctivae.  Anicteric sclerae.  No

oropharyngeal lesion.

NECK:  No neck rigidity.

CHEST:  Examination kyphosis.

LUNGS:  Examination shows no rales, crackles or wheezing.

CARDIOVASCULAR:  Examination shows S1, S2, regular rhythm.

ABDOMEN:  Morbidly obese.

GENITALIA:  Female.

RECTAL:  Examination is deferred.

EXTREMITIES:  Shows chronic nonpitting swelling of the lower extremities.

MUSCULOSKELETAL:  Examination shows a body mass index of almost 49.

NEUROLOGIC:  The patient is alert, awake, responsive, oriented x3.



CURRENT MEDICATIONS:  Ativan 2 mg IM q. 6 hours p.r.n., Benadryl 50 mg IM

q. 6 hours p.r.n., Cogentin 1 mg a.m. and at bedtime, Cozaar 50 mg daily,

Depakote  mg twice a day, Drisdol 50,000 weekly, Haldol 5 mg IM q. 6

hours p.r.n. and Haldol 10 mg a.m. and at bedtime and Haldol 10 mg at 4

p.m., nicotine patch 7 mg daily, Protonix 40 mg daily, Tylenol 650 q. 4

p.r.n., Xopenex nebulizer 0.63 mg every 6 hours.



IMPRESSION:

1.  Acute exacerbation of schizoaffective disorder with psychosis.

2.  Hypertension.

3.  Morbid obesity with elevated body mass index of almost 49.

4.  Nicotine, alcohol, and recreational drug use and PCP use.

5.  Disorganized and tangential thought disorder with overproductive

speech.

6.  Manic episodes.

7.  Anxiety disorder with overproductive pressured speech and grandiose

delusion.

8.  Possible chronic obstructive pulmonary disease or emphysema.

9.  Hypovitaminosis D.

1.  Acute exacerbation of schizoaffective disorder.

2.  Intermittent manic episode with poor insight and judgment.

3.  Insomnia.

4.  Grandiose and manic behavior.

5.  Insomnia.

6.  Poor dental hygiene.

7.  Over productive speech with manic behavior and tangential thought

process.

8.  Manic episode with racing thoughts.

9.  History of poor compliance and noncompliance.

10.  Rambling and over productive speech with racing thoughts and grandiose

delusions.

11.  Labile affect and mood with agitation with poor decision making.

12.  Noncompliance with medication.

13.  Episodic anxiety.

14.  Morbid obesity.

15.  Hypertension.

16.  History of nicotine, alcohol, polysubstance abuse and dependence.

17.  Obesity with elevated body mass index.

18.  History of phencyclidine abuse.

1.  Chronic tooth problem and probably poor dental hygiene with dental

caries.

2.  Acute exacerbation of schizoaffective disorder.

3.  Hypertension.

4.  Tachycardia.

5.  Morbid obesity with elevated body mass index.

6.  History of poor compliance and noncompliance.

7.  History of behavioral disorder.

8.  Active nicotine, alcohol, and recreational drug use and dependence, and

history of PCP use.

9.  Bilateral nonpitting lower extremity venous stasis.

10.  History of questionable chronic obstructive pulmonary disease versus

asthma.

11.  Hypovitaminosis D.

1.  Acute exacerbation of schizoaffective disorder with psychosis and

behavioral disturbances.

2.  Active nicotine, alcohol, PCP, polysubstance abuse and dependence with

substance-induced mood disorder.

3.  Morbid obesity.

4.  Hypertension.

5.  Asthma.

6.  Nicotine dependence.

7.  Transient tachycardia.

8.  Obesity with elevated body mass index of greater than 48.

9.  Hypertensive cardiovascular disease.

1.  Acute exacerbation of schizophrenia with psychosis.

2.  Active nicotine, alcohol, polysubstance abuse, and PCP abuse.

3.  Hypertension.

4.  Hypovitaminosis D.

5.  Morbid obesity.

6.  Questionable asthma.

7.  Psychosis.

8.  History of poor compliance and noncompliance with medication.

9.  Questionable behavioral disorder.

10.  Insomnia.

1.  Acute exacerbation of schizophrenia and psychosis.

2.  Questionable and possible behavioral disorder.

3.  Noncompliance and poor compliance.

4.  Super morbid obesity.

5.  Active nicotine, alcohol and drug abuse and PCP abuse and dependence.

6.  Noncompliance.

7.  Hypertension.

8.  Tachycardia.

9.  History of asthma.

10.  Hypovitaminosis D.

17.  Bilateral lower extremity trace venous stasis.

18.  Insomnia.

19.  Hypertension.

1.  Acute psychosis with episodic agitation with disorganized thought and

flat affect.

2.  Schizoaffective disorder.

3.  Active nicotine, alcohol and PCP abuse and dependence.

4.  Substance-induced mood disorder.

5.  Hypertension.

6.  Transient tachycardia, probably secondary to anxiety.

7.  Morbid obesity with elevated body mass index of 42.

8.  Insomnia.

9.  Psychosis with disorganized speech.

10.  Behavioral disorder with agitation.

1.  Acute psychosis with disorganized, tangential speech and thoughts with

gait dysfunction.

2.  Morbid obesity.

3.  Insomnia.

4.  Anxious, restless and agitated state with anxiety.

5.  History of hypertension.

6.  Morbid obesity with elevated body mass index of 42.

7.  Urine drug screen positive for PCP.

8.  Active nicotine, alcohol and drug abuse and dependence.

9.  Paranoia and auditory, visual hallucination.

10.  Poor personal hygiene.

11.  Psychomotor agitation and retardation.

12.  Substance-induced mood disorder.

13.  Schizoaffective disorder.

14.  Nicotine, alcohol PCP use abuse disorder.

15.  History of questionable asthma.

1.  Active psychosis with behavioral disorder.

2.  Active psychosis with behavioral disorder with flat affect,

disorganized speech, and tangential responses.

3.  Morbid obesity.

4.  Hypertension.

5.  Tachycardia.

6.  History of chronic noncompliance.

7.  Active PCP use.

8.  Active alcohol, tobacco abuse, and dependence.

9.  Insomnia.

1.  Questionable acute exacerbation of schizoaffective disorder with

bizarre behavior and behavioral disorder.

2.  Chronic noncompliance and poor compliance with medication.

3.  Active PCP use.

4.  Substance-induced mood disorder.

5.  Alcohol and tobacco use and abuse disorder.

6.  Morbid obesity.

7.  Transient uncontrolled hypertension.

8.  Questionable altered mental status secondary to underlying psychiatric

problem versus medications.

9.  Active nicotine, alcohol, and substance abuse disorder.

10.  Insomnia.

11.  Hypertension.

12.  Hypovitaminosis D.

13.  Active nicotine addiction.



PLAN:  At this time, the patient was seen by the psychiatrist and the

.  The patient has declined to sign the 48-hour notice.  The

patient has been advised out of bed to chair, RAYMOND stockings.  The patient

was seen by physical therapist.  The patient was discharged from PT as the

patient is independent.  At present, the patient's further management will

be dependent upon the patient's clinical condition, hemodynamic status, and

as per psychiatrist's recommendation.



Dictated and electronically signed, not read.





__________________________________________

George Davis MD



DD:  10/17/2017 17:45:10

DT:  10/17/2017 17:50:52

Job # [id number]





MTDD

## 2017-10-17 NOTE — PN
DATE:  10/16/2017



SUBJECTIVE:  The patient is seen, sitting up in the bay room.  The

patient's overnight nurses' notes were reviewed.  Overnight nurses' notes

noted that the patient was cooperative and pleasant, but talkative with

some manic behavior, tangential thoughts, and compliant with medication. 

The patient complained of toothache and a dentist appointment.  The patient

complained of some insomnia.  The patient was found to be manic with the

manic racing thoughts.  The patient was seen by .  The patient

does not appear to be ready for discharge due to behavior.  The patient was

found to be irritable and upset.  The patient's  contacted

[facility] outpatient office.  According to the [facility] outpatient office, the patient has reported to have missed 5

psychiatric appointments since February.  The patient's case was closed due

to failure to followup.  The patient was found to be rambling, over

productive speech, racing and grandiose delusion and agitated and

argumentative.  The patient refused Depakote according to the nurses' notes

today.  The patient was found to be anxious.



OBJECTIVE:

VITAL SIGNS:  T-max afebrile, heart rate down to 72, blood pressure 122/83

and 134/81, and respirations 16.

HEENT:  Head examination; normocephalic and atraumatic.  HEENT examination

shows pink conjunctivae.  Anicteric sclerae.  No oropharyngeal lesion.

NECK:  No neck rigidity.

CHEST:  Kyphosis.

LUNGS:  Shows no rales, crackles or wheezing.

CARDIOVASCULAR:  S1 and S2, regular rhythm.

ABDOMEN:  Morbidly obese.

GENITALIA:  Female.

RECTAL:  Deferred.

EXTREMITIES:  Shows chronic nonpitting swelling of the lower extremity.

MUSCULOSKELETAL:  Shows elevated body mass index of 49.

NEUROLOGIC:  The patient is alert, awake, and responsive, is able to move

upper and lower extremity without assistance.  Gait examination is

independent.

VASCULAR:  Palpable pulses.  Cranial nerves II-XII limited.



IMPRESSION:

1.  Acute exacerbation of schizoaffective disorder.

2.  Intermittent manic episode with poor insight and judgment.

3.  Insomnia.

4.  Grandiose and manic behavior.

5.  Insomnia.

6.  Poor dental hygiene.

7.  Over productive speech with manic behavior and tangential thought

process.

8.  Manic episode with racing thoughts.

9.  History of poor compliance and noncompliance.

10.  Rambling and over productive speech with racing thoughts and grandiose

delusions.

11.  Labile affect and mood with agitation with poor decision making.

12.  Noncompliance with medication.

13.  Episodic anxiety.

14.  Morbid obesity.

15.  Hypertension.

16.  History of nicotine, alcohol, polysubstance abuse and dependence.

17.  Obesity with elevated body mass index.

18.  History of phencyclidine abuse.

1.  Chronic tooth problem and probably poor dental hygiene with dental

caries.

2.  Acute exacerbation of schizoaffective disorder.

3.  Hypertension.

4.  Tachycardia.

5.  Morbid obesity with elevated body mass index.

6.  History of poor compliance and noncompliance.

7.  History of behavioral disorder.

8.  Active nicotine, alcohol, and recreational drug use and dependence, and

history of PCP use.

9.  Bilateral nonpitting lower extremity venous stasis.

10.  History of questionable chronic obstructive pulmonary disease versus

asthma.

11.  Hypovitaminosis D.

1.  Acute exacerbation of schizoaffective disorder with psychosis and

behavioral disturbances.

2.  Active nicotine, alcohol, PCP, polysubstance abuse and dependence with

substance-induced mood disorder.

3.  Morbid obesity.

4.  Hypertension.

5.  Asthma.

6.  Nicotine dependence.

7.  Transient tachycardia.

8.  Obesity with elevated body mass index of greater than 48.

9.  Hypertensive cardiovascular disease.

1.  Acute exacerbation of schizophrenia with psychosis.

2.  Active nicotine, alcohol, polysubstance abuse, and PCP abuse.

3.  Hypertension.

4.  Hypovitaminosis D.

5.  Morbid obesity.

6.  Questionable asthma.

7.  Psychosis.

8.  History of poor compliance and noncompliance with medication.

9.  Questionable behavioral disorder.

10.  Insomnia.

1.  Acute exacerbation of schizophrenia and psychosis.

2.  Questionable and possible behavioral disorder.

3.  Noncompliance and poor compliance.

4.  Super morbid obesity.

5.  Active nicotine, alcohol and drug abuse and PCP abuse and dependence.

6.  Noncompliance.

7.  Hypertension.

8.  Tachycardia.

9.  History of asthma.

10.  Hypovitaminosis D.

17.  Bilateral lower extremity trace venous stasis.

18.  Insomnia.

19.  Hypertension.

1.  Acute psychosis with episodic agitation with disorganized thought and

flat affect.

2.  Schizoaffective disorder.

3.  Active nicotine, alcohol and PCP abuse and dependence.

4.  Substance-induced mood disorder.

5.  Hypertension.

6.  Transient tachycardia, probably secondary to anxiety.

7.  Morbid obesity with elevated body mass index of 42.

8.  Insomnia.

9.  Psychosis with disorganized speech.

10.  Behavioral disorder with agitation.

1.  Acute psychosis with disorganized, tangential speech and thoughts with

gait dysfunction.

2.  Morbid obesity.

3.  Insomnia.

4.  Anxious, restless and agitated state with anxiety.

5.  History of hypertension.

6.  Morbid obesity with elevated body mass index of 42.

7.  Urine drug screen positive for PCP.

8.  Active nicotine, alcohol and drug abuse and dependence.

9.  Paranoia and auditory, visual hallucination.

10.  Poor personal hygiene.

11.  Psychomotor agitation and retardation.

12.  Substance-induced mood disorder.

13.  Schizoaffective disorder.

14.  Nicotine, alcohol PCP use abuse disorder.

15.  History of questionable asthma.

1.  Active psychosis with behavioral disorder.

2.  Active psychosis with behavioral disorder with flat affect,

disorganized speech, and tangential responses.

3.  Morbid obesity.

4.  Hypertension.

5.  Tachycardia.

6.  History of chronic noncompliance.

7.  Active PCP use.

8.  Active alcohol, tobacco abuse, and dependence.

9.  Insomnia.

1.  Questionable acute exacerbation of schizoaffective disorder with

bizarre behavior and behavioral disorder.

2.  Chronic noncompliance and poor compliance with medication.

3.  Active PCP use.

4.  Substance-induced mood disorder.

5.  Alcohol and tobacco use and abuse disorder.

6.  Morbid obesity.

7.  Transient uncontrolled hypertension.

8.  Questionable altered mental status secondary to underlying psychiatric

problem versus medications.

9.  Active nicotine, alcohol, and substance abuse disorder.

10.  Insomnia.

11.  Hypertension.

12.  Hypovitaminosis D.

13.  Active nicotine addiction.





PLAN:  At this time, the patient is to be continued on inpatient

psychiatric treatment.  The patient is currently on Ativan 2 mg IM q.6

hours p.r.n., Benadryl 50 mg IM q.6 hours p.r.n., Cogentin 1 mg a.m. and at

bedtime, Cozaar 50 mg daily, Depakote 250 mg twice a day, Drisdol 50,000

weekly, Haldol 5 mg IM q.6 hours p.r.n., nicotine patch 7 mg daily,

Protonix 40 mg daily, Tylenol 650 q.4 hours p.r.n., Xopenex nebulizer 0.63

mg q.6 hours, respiratory, Zyprexa 7.5 mg twice a day which is increased

today.  The patient is also started on Depakote 250 twice a day.  Today,

the patient has monitored out of bed and RAYMOND stockings.  At present, the

patient appears to be medically stable.  The patient needs dental

evaluation for her poor dental hygiene.



Dictated and electronically signed, not read.





__________________________________________

George Davis MD





DD:  10/16/2017 22:47:27

DT:  10/16/2017 22:51:44

Job # [id number]







MTDD

## 2017-10-17 NOTE — PCM.PYCHPN
Psychiatric Progress Note





- Psychiatric Progress Note


Patient seen today, length of contact: 30min


Patient Chief Complaint: 





" recommended to sleep with no panties on, for vagina to breath"


Medical Problems: 





asthma, obesity








Diagnostic Results: 














 10/09/17 09:40 





 10/09/17 09:40 





 Lab Results





10/09/17 09:40: Sodium 140, Potassium 4.0, Chloride 106, Carbon Dioxide 24, 

Anion Gap 14, BUN 12, Creatinine 1.0, Est GFR (African Amer) > 60, Est GFR (Non-

Af Amer) > 60, Random Glucose 117 H, Calcium 9.2, Magnesium 2.0, Total 

Bilirubin 0.3, Direct Bilirubin 0.3, AST 20, ALT 25, Alkaline Phosphatase 79, 

Total Protein 7.3, Albumin 3.9, Globulin 3.4, Albumin/Globulin Ratio 1.1


10/09/17 09:40: WBC 6.2, RBC 4.82, Hgb 13.4, Hct 39.9, MCV 82.8, MCH 27.8, MCHC 

33.6, RDW 14.6 H, Plt Count 315, MPV 9.5, Gran % 51.1, Lymph % (Auto) 36.3 H, 

Mono % (Auto) 7.9 H, Eos % (Auto) 4.5, Baso % (Auto) 0.2, Gran # 3.19, Lymph # 

2.3, Mono # 0.5, Eos # 0.3, Baso # 0.01


10/07/17 11:05: Urine Opiates Screen Negative, Urine Methadone Screen Negative, 

Ur Barbiturates Screen Negative, Ur Phencyclidine Scrn Positive H, Ur 

Amphetamines Screen Negative, U Benzodiazepines Scrn Negative, U Oth Cocaine 

Metabols Negative, U Cannabinoids Screen Negative


10/07/17 11:05: Urine Color Straw, Urine Appearance Slight-cloudy, Urine pH 7.0

, Ur Specific Gravity <= 1.005, Urine Protein Negative, Urine Glucose (UA) 

Negative, Urine Ketones Negative, Urine Blood Negative, Urine Nitrate Negative, 

Urine Bilirubin Negative, Urine Urobilinogen 0.2, Ur Leukocyte Esterase Negative


10/07/17 10:53: Alcohol, Quantitative < 10


10/07/17 10:53: Salicylates < 1 L, Acetaminophen < 10.0 L


10/07/17 10:53: Sodium 142, Potassium 3.8, Chloride 108 H, Carbon Dioxide 26, 

Anion Gap 12, BUN 8, Creatinine 1.0, Est GFR (African Amer) > 60, Est GFR (Non-

Af Amer) > 60, Random Glucose 102, Calcium 9.0, Total Bilirubin 0.3, AST 25, 

ALT 23, Alkaline Phosphatase 106, Total Creatine Kinase 289 H, CK-MB (CK-2) 0.9

, CK-MB (CK-2) % Cancelled, Total Protein 7.2, Albumin 3.8, Globulin 3.4, 

Albumin/Globulin Ratio 1.1


10/07/17 10:53: WBC 6.9, RBC 4.46, Hgb 12.5, Hct 37.0, MCV 83.0, MCH 28.0, MCHC 

33.8, RDW 14.6 H, Plt Count 310, MPV 9.2, Gran % 60.6, Lymph % (Auto) 29.5, 

Mono % (Auto) 6.3 H, Eos % (Auto) 3.3, Baso % (Auto) 0.3, Gran # 4.17, Lymph # 

2.0, Mono # 0.4, Eos # 0.2, Baso # 0.02











Vital Signs











  Temp Pulse Resp BP Pulse Ox


 


 10/09/17 07:00  97.6 F  95 H  19  154/95 H  96


 


 10/08/17 03:00   85   149/85 


 


 10/07/17 22:00  97.7 F  78  20  148/68  99


 


 10/07/17 17:48   78  14  156/99 H  100


 


 10/07/17 16:41   80   174/113 H 


 


 10/07/17 15:59   83  16  174/113 H  98


 


 10/07/17 14:00   83  18  151/101 H  98


 


 10/07/17 12:52   64  18  128/73  95


 


 10/07/17 10:05  98.2 F  86  18  142/87  100














 











Temp Pulse Resp BP Pulse Ox


 


 98.4 F   80   16   113/72   96 


 


 10/10/17 07:03  10/10/17 07:03  10/10/17 07:03  10/10/17 07:03  10/09/17 07:00











 











Temp Pulse Resp BP Pulse Ox


 


 98.4 F   100 H  16   140/99 H  96 


 


 10/10/17 07:03  10/11/17 09:23  10/10/17 07:03  10/11/17 09:23  10/09/17 07:00











 











Temp Pulse Resp BP Pulse Ox


 


 98.4 F   83   16   141/92 H  96 


 


 10/10/17 07:03  10/13/17 08:15  10/10/17 07:03  10/13/17 08:15  10/09/17 07:00











 











Temp Pulse Resp BP Pulse Ox


 


 98.0 F   72   16   122/83   96 


 


 10/17/17 07:41  10/17/17 09:38  10/17/17 07:41  10/17/17 09:38  10/14/17 06:53








DSM 5 Symptoms Update: 





Shortly pt is 36yo AAF with long h/o mental illness, h/o multiple admissions in 

[place] [facility] [place] [facility] [place] 

than two weeks ago), pt also has h/o substance use disorder, pt was admitted 

for evaluation and stabilization of disorganized, psychotic behavior, pt was 

found wondering on the streets with no shoes and pants on, pt was disorganized 

and psychotic, pt needed to be medicated in ED as well code [place] was called 

over night in the psych inpatient unit, pt has chronic noncompliance with 

medications and follow up appointments. 





pt was seen today at tx team room, presented to have pressured and disorganized 

speech, pt said that she is ready for discharge, pt said that she has at least 

two potential interviews for the  [address] apartments. pt seems to be in 

hypomanic stage, said that she was living in her mother's car, pt does not know 

the circumstances of her admission to the psych unit, said "I was following 

what  recommended, to sleep with no panties on for vagina to breath", 

inappropriate sexual comments as well as per collateral info from staff.  





as per staff pt takes meds, no behavioral incidents. 





Review of Systems: see Medical consult 





in regards of hygiene, pt has strong body odor, pt trying to cover it with the 

strong perfume fragrance, pt has bright orange eyeshadows seems pt used marker. 





as per mother's collaterals pt is not ready for d/c.





MSE:


Pt deemed to be unreliable historian, pt is disorganized, poor personal hygiene

, good ADLs, psychomotor agitation/retardation, speech was: mumbling, eye 

contact: no eye contact , mood described: "I am fine, I need to go", affect: 

labile, mood incongruent, thought process:disorganized, thought content: pt 

denied SI/ HI, pt obviously paranoid "my mother wants to keep me here, she is 

stealing money from me", psychotic, disorganized, internally preoccupied, 

insight/judgment: impaired, impulses are unpredictable. 





Impression:


schizoaffective disorder


PCP abuse


substance induced mood disorder


alcohol use disorder


tobacco abuse





Treatment plan: 


Milieu/structure/supportive therapy


Medical consult appreciated, see medical team note for more detailed info


 consultation for discharge plan and social issues


Med management


patient currently is on one-to-one observation for safety, risk of falls, 

disorganized behavior.


Benztropine 1mg po amhs for EPS


pt requested Haldol resumption, pt will be started haldol 10mg amhs for 

psychosis in order to start Haldol Dec, pt is noncompliant with meds and f/u 

appts


Losartan [Cozaar] 50 mg PO DAILY 


Nicotine 7 mg/24 hr [Nicoderm CQ] 1 patch TD DAILY 


Family involvement


Follow up on labs


Will monitor closely


 evaluation for d/c planning


Pt was educated about risk/benefits and alternatives of medications, coping 

strategies (safety plan, suicide prevention), relapse prevention, importance of 

follow up with psychiatrist and therapist, stay away from drugs/alcohol/

smokingshe is I didn't see  was ordering any antibiotics starting as she 

wanted to see diathesis as possible


Medication Change: Yes (haldol resumed)


Medical Record Reviewed: Yes





Mental Status Examination





- Cognitive Function


Orientation: Person, Place


Attention: Poor


Concentration: Poor


Association: Loose





- Mood


Mood: Other ( "feeling good")





- Affect


Affect: Broad





- Speech


Speech: Appropriate (rushed but less pressured)





- Formal Thought Process


Formal Thought Process: Loosening of associations (overinclusive)





- Suicidal Ideation


Suicidal Ideation: No





- Homicidal Ideation


Homicidal Ideation: No





Goal/Treatment Plan





- Goal/Treatment Plan


Need for Continued Stay: Remain at risks for inpatient hospitalization, Severe 

depression anxiety, Discharge may exacerbated symptoms, Failed transitioning, 

Severe functional impairment


Estimated Date of D/C: 10/20/17

## 2017-10-18 RX ADMIN — PANTOPRAZOLE SODIUM SCH: 40 TABLET, DELAYED RELEASE ORAL at 06:00

## 2017-10-18 RX ADMIN — LEVALBUTEROL SCH MG: 0.63 SOLUTION RESPIRATORY (INHALATION) at 13:40

## 2017-10-18 RX ADMIN — DIVALPROEX SODIUM SCH: 250 TABLET, DELAYED RELEASE ORAL at 08:06

## 2017-10-18 RX ADMIN — LEVALBUTEROL SCH: 0.63 SOLUTION RESPIRATORY (INHALATION) at 20:39

## 2017-10-18 RX ADMIN — LEVALBUTEROL SCH: 0.63 SOLUTION RESPIRATORY (INHALATION) at 01:37

## 2017-10-18 RX ADMIN — LEVALBUTEROL SCH MG: 0.63 SOLUTION RESPIRATORY (INHALATION) at 08:24

## 2017-10-18 NOTE — PCM.PYCHPN
Psychiatric Progress Note





- Psychiatric Progress Note


Patient seen today, length of contact: 30min


Patient Chief Complaint: 





"I am working on a new song..."


Medical Problems: 





asthma, obesity








Diagnostic Results: 














 10/09/17 09:40 





 10/09/17 09:40 





 Lab Results





10/09/17 09:40: Sodium 140, Potassium 4.0, Chloride 106, Carbon Dioxide 24, 

Anion Gap 14, BUN 12, Creatinine 1.0, Est GFR (African Amer) > 60, Est GFR (Non-

Af Amer) > 60, Random Glucose 117 H, Calcium 9.2, Magnesium 2.0, Total 

Bilirubin 0.3, Direct Bilirubin 0.3, AST 20, ALT 25, Alkaline Phosphatase 79, 

Total Protein 7.3, Albumin 3.9, Globulin 3.4, Albumin/Globulin Ratio 1.1


10/09/17 09:40: WBC 6.2, RBC 4.82, Hgb 13.4, Hct 39.9, MCV 82.8, MCH 27.8, MCHC 

33.6, RDW 14.6 H, Plt Count 315, MPV 9.5, Gran % 51.1, Lymph % (Auto) 36.3 H, 

Mono % (Auto) 7.9 H, Eos % (Auto) 4.5, Baso % (Auto) 0.2, Gran # 3.19, Lymph # 

2.3, Mono # 0.5, Eos # 0.3, Baso # 0.01


10/07/17 11:05: Urine Opiates Screen Negative, Urine Methadone Screen Negative, 

Ur Barbiturates Screen Negative, Ur Phencyclidine Scrn Positive H, Ur 

Amphetamines Screen Negative, U Benzodiazepines Scrn Negative, U Oth Cocaine 

Metabols Negative, U Cannabinoids Screen Negative


10/07/17 11:05: Urine Color Straw, Urine Appearance Slight-cloudy, Urine pH 7.0

, Ur Specific Gravity <= 1.005, Urine Protein Negative, Urine Glucose (UA) 

Negative, Urine Ketones Negative, Urine Blood Negative, Urine Nitrate Negative, 

Urine Bilirubin Negative, Urine Urobilinogen 0.2, Ur Leukocyte Esterase Negative


10/07/17 10:53: Alcohol, Quantitative < 10


10/07/17 10:53: Salicylates < 1 L, Acetaminophen < 10.0 L


10/07/17 10:53: Sodium 142, Potassium 3.8, Chloride 108 H, Carbon Dioxide 26, 

Anion Gap 12, BUN 8, Creatinine 1.0, Est GFR (African Amer) > 60, Est GFR (Non-

Af Amer) > 60, Random Glucose 102, Calcium 9.0, Total Bilirubin 0.3, AST 25, 

ALT 23, Alkaline Phosphatase 106, Total Creatine Kinase 289 H, CK-MB (CK-2) 0.9

, CK-MB (CK-2) % Cancelled, Total Protein 7.2, Albumin 3.8, Globulin 3.4, 

Albumin/Globulin Ratio 1.1


10/07/17 10:53: WBC 6.9, RBC 4.46, Hgb 12.5, Hct 37.0, MCV 83.0, MCH 28.0, MCHC 

33.8, RDW 14.6 H, Plt Count 310, MPV 9.2, Gran % 60.6, Lymph % (Auto) 29.5, 

Mono % (Auto) 6.3 H, Eos % (Auto) 3.3, Baso % (Auto) 0.3, Gran # 4.17, Lymph # 

2.0, Mono # 0.4, Eos # 0.2, Baso # 0.02











Vital Signs











  Temp Pulse Resp BP Pulse Ox


 


 10/09/17 07:00  97.6 F  95 H  19  154/95 H  96


 


 10/08/17 03:00   85   149/85 


 


 10/07/17 22:00  97.7 F  78  20  148/68  99


 


 10/07/17 17:48   78  14  156/99 H  100


 


 10/07/17 16:41   80   174/113 H 


 


 10/07/17 15:59   83  16  174/113 H  98


 


 10/07/17 14:00   83  18  151/101 H  98


 


 10/07/17 12:52   64  18  128/73  95


 


 10/07/17 10:05  98.2 F  86  18  142/87  100














 











Temp Pulse Resp BP Pulse Ox


 


 98.4 F   80   16   113/72   96 


 


 10/10/17 07:03  10/10/17 07:03  10/10/17 07:03  10/10/17 07:03  10/09/17 07:00











 











Temp Pulse Resp BP Pulse Ox


 


 98.4 F   100 H  16   140/99 H  96 


 


 10/10/17 07:03  10/11/17 09:23  10/10/17 07:03  10/11/17 09:23  10/09/17 07:00











 











Temp Pulse Resp BP Pulse Ox


 


 98.4 F   83   16   141/92 H  96 


 


 10/10/17 07:03  10/13/17 08:15  10/10/17 07:03  10/13/17 08:15  10/09/17 07:00











 











Temp Pulse Resp BP Pulse Ox


 


 98.0 F   72   16   122/83   96 


 


 10/17/17 07:41  10/17/17 09:38  10/17/17 07:41  10/17/17 09:38  10/14/17 06:53








DSM 5 Symptoms Update: 





Shortly pt is 36yo AAF with long h/o mental illness, h/o multiple admissions in 

the psychiatric inpatient unit [place] [facility] [place] [facility] [place] pt also has h/o substance use disorder, pt was admitted 

for evaluation and stabilization of disorganized, psychotic behavior, pt was 

found wondering on the streets with no shoes and pants on, pt was disorganized 

and psychotic, pt needed to be medicated in ED as well code [place] was called 

over night in the psych inpatient unit, pt has chronic noncompliance with 

medications and follow up appointments. 





pt was seen today next to the nursing station, pt presented to be disorganized 

pt said that he is in relationship with Jose Ramon ZHOU, pt said that she has many fur 

coats "I have carmen's fur coats, I have a lot of brand clothing", pt also 

said that she worked on the song while was in here and one of other pt's uncle 

works in a music industry and she wrote a song earlier. Pt handed a words for  

the song "WORDS, WORDS, WORDS, WORDS KEEP IT UP" statement was repeated on at 

least 10 pages, pt tried to rap as well. haldol was increased, tegretol resumed 

pt refused to take depakote. 





as per staff pt takes meds, no behavioral incidents, took a shower after strong 

encouragement, still smells badly, pt also appears to be sexually preoccupied. .





MSE:


Pt deemed to be unreliable historian, pt is disorganized, poor personal hygiene

, good ADLs, psychomotor agitation/retardation, speech was: mumbling, eye 

contact: no eye contact , mood described: "I feel great, I am working on a new 

song", affect: labile, mood incongruent, thought process:disorganized, thought 

content: pt denied SI/ HI, pt obviously paranoid "my mother wants to keep me 

here, she is stealing money from me", psychotic, disorganized, internally 

preoccupied, insight/judgment: impaired, impulses are unpredictable. 





Impression:


schizoaffective disorder


PCP abuse, r/o permanent neurological damage


substance induced mood disorder


alcohol use disorder


tobacco abuse





Treatment plan: 


Milieu/structure/supportive therapy


Medical consult appreciated, see medical team note for more detailed info


 consultation for discharge plan and social issues


Med management


patient currently is on one-to-one observation for safety, risk of falls, 

disorganized behavior.


Benztropine 1mg po amhs for EPS


Haldol 10mg tid for psychosis in order to start Haldol Dec, pt is noncompliant 

with meds and f/u appts


Losartan [Cozaar] 50 mg PO DAILY 


Nicotine 7 mg/24 hr [Nicoderm CQ] 1 patch TD DAILY 


tegretol was resumed 200mg po bid for mood stabilization


Family involvement


Follow up on labs


Will monitor closely


 evaluation for d/c planning


Pt was educated about risk/benefits and alternatives of medications, coping 

strategies (safety plan, suicide prevention), relapse prevention, importance of 

follow up with psychiatrist and therapist, stay away from drugs/alcohol/smoking











Medication Change: Yes (haldol resumed)


Medical Record Reviewed: Yes





Mental Status Examination





- Cognitive Function


Orientation: Person, Place


Attention: Poor


Concentration: Poor


Association: Loose





- Mood


Mood: Other ( "feeling good")





- Affect


Affect: Broad





- Speech


Speech: Appropriate (rushed but less pressured)





- Formal Thought Process


Formal Thought Process: Loosening of associations (overinclusive)





- Suicidal Ideation


Suicidal Ideation: No





- Homicidal Ideation


Homicidal Ideation: No





Goal/Treatment Plan





- Goal/Treatment Plan


Need for Continued Stay: Remain at risks for inpatient hospitalization, Severe 

depression anxiety, Discharge may exacerbated symptoms, Failed transitioning, 

Severe functional impairment


Estimated Date of D/C: 10/20/17

## 2017-10-19 RX ADMIN — LEVALBUTEROL SCH: 0.63 SOLUTION RESPIRATORY (INHALATION) at 14:13

## 2017-10-19 RX ADMIN — LEVALBUTEROL SCH: 0.63 SOLUTION RESPIRATORY (INHALATION) at 19:57

## 2017-10-19 RX ADMIN — PANTOPRAZOLE SODIUM SCH MG: 40 TABLET, DELAYED RELEASE ORAL at 06:16

## 2017-10-19 RX ADMIN — LEVALBUTEROL SCH: 0.63 SOLUTION RESPIRATORY (INHALATION) at 01:35

## 2017-10-19 RX ADMIN — LEVALBUTEROL SCH: 0.63 SOLUTION RESPIRATORY (INHALATION) at 09:00

## 2017-10-19 NOTE — PN
DATE:  10/18/2017



SUBJECTIVE:  The patient is seen in day room in Psychiatry floor.  The

patient is out of bed to chair.  The patient is consistently and

persistently asking about going home.  The patient slept well for short

times overnight according to the nurse's notes.  The patient was seen by

the .  The patient's mother met with the .  The

patient's speech was overproductive and pressured.  The patient is

consistently and persistently requesting to be discharged.



PHYSICAL EXAMINATION:

VITAL SIGNS:  T-max 98.4; heart rate 90, 91, 72, 88, 101; blood pressure in

the last 24 hours 128/81, 143/97, 123/83, 134/94; respirations 16 and O2

saturation is 95%, 96%, 98%, 99%.

HEAD:  Normocephalic and atraumatic.

HEENT:  Shows pink conjunctivae.  Anicteric sclerae.  No oropharyngeal

lesion.

NECK:  No neck rigidity.

CHEST:  Symmetrical.

LUNGS:  Shows no rales, crackles or wheezing.

CARDIOVASCULAR:  Shows S1 and S2, regular rhythm.

ABDOMEN:  Obese.  Positive bowel sounds.

GENITALIA:  Female.

RECTAL:  Deferred.

EXTREMITIES:  Shows nonpitting edema with swelling of the lower extremity.

MUSCULOSKELETAL:  Shows an elevated body mass index of 50.5.



DIAGNOSTIC DATA:  None.



IMPRESSION:

1.  Hypertension.

2.  Transient tachycardia.

3.  Acute exacerbation of schizoaffective disorder.

4.  Morbid obesity with elevated body mass index of 50.5.

5.  Nicotine, alcohol, polysubstance abuse, and PCP use and dependence.

6.  Obesity.

7.  Manic episodes.

8.  Hypovitaminosis D.

9.  Nicotine dependence.

10.  Asthma versus chronic obstructive pulmonary disease.

1.  Acute exacerbation of schizoaffective disorder with psychosis.

2.  Hypertension.

3.  Morbid obesity with elevated body mass index of almost 49.

4.  Nicotine, alcohol, and recreational drug use and PCP use.

5.  Disorganized and tangential thought disorder with overproductive

speech.

6.  Manic episodes.

7.  Anxiety disorder with overproductive pressured speech and grandiose

delusion.

8.  Possible chronic obstructive pulmonary disease or emphysema.

9.  Hypovitaminosis D.

1.  Acute exacerbation of schizoaffective disorder.

2.  Intermittent manic episode with poor insight and judgment.

3.  Insomnia.

4.  Grandiose and manic behavior.

5.  Insomnia.

6.  Poor dental hygiene.

7.  Over productive speech with manic behavior and tangential thought

process.

8.  Manic episode with racing thoughts.

9.  History of poor compliance and noncompliance.

10.  Rambling and over productive speech with racing thoughts and grandiose

delusions.

11.  Labile affect and mood with agitation with poor decision making.

12.  Noncompliance with medication.

13.  Episodic anxiety.

14.  Morbid obesity.

15.  Hypertension.

16.  History of nicotine, alcohol, polysubstance abuse and dependence.

17.  Obesity with elevated body mass index.

18.  History of phencyclidine abuse.

1.  Chronic tooth problem and probably poor dental hygiene with dental

caries.

2.  Acute exacerbation of schizoaffective disorder.

3.  Hypertension.

4.  Tachycardia.

5.  Morbid obesity with elevated body mass index.

6.  History of poor compliance and noncompliance.

7.  History of behavioral disorder.

8.  Active nicotine, alcohol, and recreational drug use and dependence, and

history of PCP use.

9.  Bilateral nonpitting lower extremity venous stasis.

10.  History of questionable chronic obstructive pulmonary disease versus

asthma.

11.  Hypovitaminosis D.

1.  Acute exacerbation of schizoaffective disorder with psychosis and

behavioral disturbances.

2.  Active nicotine, alcohol, PCP, polysubstance abuse and dependence with

substance-induced mood disorder.

3.  Morbid obesity.

4.  Hypertension.

5.  Asthma.

6.  Nicotine dependence.

7.  Transient tachycardia.

8.  Obesity with elevated body mass index of greater than 48.

9.  Hypertensive cardiovascular disease.

1.  Acute exacerbation of schizophrenia with psychosis.

2.  Active nicotine, alcohol, polysubstance abuse, and PCP abuse.

3.  Hypertension.

4.  Hypovitaminosis D.

5.  Morbid obesity.

6.  Questionable asthma.

7.  Psychosis.

8.  History of poor compliance and noncompliance with medication.

9.  Questionable behavioral disorder.

10.  Insomnia.

1.  Acute exacerbation of schizophrenia and psychosis.

2.  Questionable and possible behavioral disorder.

3.  Noncompliance and poor compliance.

4.  Super morbid obesity.

5.  Active nicotine, alcohol and drug abuse and PCP abuse and dependence.

6.  Noncompliance.

7.  Hypertension.

8.  Tachycardia.

9.  History of asthma.

10.  Hypovitaminosis D.

17.  Bilateral lower extremity trace venous stasis.

18.  Insomnia.

19.  Hypertension.

1.  Acute psychosis with episodic agitation with disorganized thought and

flat affect.

2.  Schizoaffective disorder.

3.  Active nicotine, alcohol and PCP abuse and dependence.

4.  Substance-induced mood disorder.

5.  Hypertension.

6.  Transient tachycardia, probably secondary to anxiety.

7.  Morbid obesity with elevated body mass index of 42.

8.  Insomnia.

9.  Psychosis with disorganized speech.

10.  Behavioral disorder with agitation.

1.  Acute psychosis with disorganized, tangential speech and thoughts with

gait dysfunction.

2.  Morbid obesity.

3.  Insomnia.

4.  Anxious, restless and agitated state with anxiety.

5.  History of hypertension.

6.  Morbid obesity with elevated body mass index of 42.

7.  Urine drug screen positive for PCP.

8.  Active nicotine, alcohol and drug abuse and dependence.

9.  Paranoia and auditory, visual hallucination.

10.  Poor personal hygiene.

11.  Psychomotor agitation and retardation.

12.  Substance-induced mood disorder.

13.  Schizoaffective disorder.

14.  Nicotine, alcohol PCP use abuse disorder.

15.  History of questionable asthma.

1.  Active psychosis with behavioral disorder.

2.  Active psychosis with behavioral disorder with flat affect,

disorganized speech, and tangential responses.

3.  Morbid obesity.

4.  Hypertension.

5.  Tachycardia.

6.  History of chronic noncompliance.

7.  Active PCP use.

8.  Active alcohol, tobacco abuse, and dependence.

9.  Insomnia.

1.  Questionable acute exacerbation of schizoaffective disorder with

bizarre behavior and behavioral disorder.

2.  Chronic noncompliance and poor compliance with medication.

3.  Active PCP use.

4.  Substance-induced mood disorder.

5.  Alcohol and tobacco use and abuse disorder.

6.  Morbid obesity.

7.  Transient uncontrolled hypertension.

8.  Questionable altered mental status secondary to underlying psychiatric

problem versus medications.

9.  Active nicotine, alcohol, and substance abuse disorder.

10.  Insomnia.

11.  Hypertension.

12.  Hypovitaminosis D.

13.  Active nicotine addiction.





PLAN:  At this time, the patient was seen by psychiatrist.



The patient is on Ativan 2 mg IM q.6 hours p.r.n., Benadryl 50 mg IM q.6

hours p.r.n., Cogentin 1 mg a.m. and bedtime, Cozaar 50 mg daily, Drisdol

50,000 weekly, Haldol 5 mg IM q.6 hours p.r.n., Haldol 10 mg a.m. and at

bedtime, and Haldol 10 mg 4 p.m., nicotine patch 7 mg daily, Protonix 40 mg

daily, Tegretol 200 mg twice a day, Tylenol 650 q.4 hours p.r.n., and

Xopenex nebulizer 0.63 mg every 6 hours.



At present, the patient was seen by Psychiatry.  Estimated date of

discharge is 10/20/2017, if the patient stays stable.



Dictated and electronically signed, not read.





__________________________________________

George Davis MD





DD:  10/18/2017 22:26:54

DT:  10/18/2017 22:32:45

Job # [id number]



[email or web address] ANGEL

## 2017-10-19 NOTE — PCM.PYCHPN
Psychiatric Progress Note





- Psychiatric Progress Note


Patient seen today, length of contact: 30min


Patient Chief Complaint: 





"I am working on a new poem..."


Medical Problems: 





asthma, obesity








Diagnostic Results: 














 10/09/17 09:40 





 10/09/17 09:40 





 Lab Results





10/09/17 09:40: Sodium 140, Potassium 4.0, Chloride 106, Carbon Dioxide 24, 

Anion Gap 14, BUN 12, Creatinine 1.0, Est GFR (African Amer) > 60, Est GFR (Non-

Af Amer) > 60, Random Glucose 117 H, Calcium 9.2, Magnesium 2.0, Total 

Bilirubin 0.3, Direct Bilirubin 0.3, AST 20, ALT 25, Alkaline Phosphatase 79, 

Total Protein 7.3, Albumin 3.9, Globulin 3.4, Albumin/Globulin Ratio 1.1


10/09/17 09:40: WBC 6.2, RBC 4.82, Hgb 13.4, Hct 39.9, MCV 82.8, MCH 27.8, MCHC 

33.6, RDW 14.6 H, Plt Count 315, MPV 9.5, Gran % 51.1, Lymph % (Auto) 36.3 H, 

Mono % (Auto) 7.9 H, Eos % (Auto) 4.5, Baso % (Auto) 0.2, Gran # 3.19, Lymph # 

2.3, Mono # 0.5, Eos # 0.3, Baso # 0.01


10/07/17 11:05: Urine Opiates Screen Negative, Urine Methadone Screen Negative, 

Ur Barbiturates Screen Negative, Ur Phencyclidine Scrn Positive H, Ur 

Amphetamines Screen Negative, U Benzodiazepines Scrn Negative, U Oth Cocaine 

Metabols Negative, U Cannabinoids Screen Negative


10/07/17 11:05: Urine Color Straw, Urine Appearance Slight-cloudy, Urine pH 7.0

, Ur Specific Gravity <= 1.005, Urine Protein Negative, Urine Glucose (UA) 

Negative, Urine Ketones Negative, Urine Blood Negative, Urine Nitrate Negative, 

Urine Bilirubin Negative, Urine Urobilinogen 0.2, Ur Leukocyte Esterase Negative


10/07/17 10:53: Alcohol, Quantitative < 10


10/07/17 10:53: Salicylates < 1 L, Acetaminophen < 10.0 L


10/07/17 10:53: Sodium 142, Potassium 3.8, Chloride 108 H, Carbon Dioxide 26, 

Anion Gap 12, BUN 8, Creatinine 1.0, Est GFR (African Amer) > 60, Est GFR (Non-

Af Amer) > 60, Random Glucose 102, Calcium 9.0, Total Bilirubin 0.3, AST 25, 

ALT 23, Alkaline Phosphatase 106, Total Creatine Kinase 289 H, CK-MB (CK-2) 0.9

, CK-MB (CK-2) % Cancelled, Total Protein 7.2, Albumin 3.8, Globulin 3.4, 

Albumin/Globulin Ratio 1.1


10/07/17 10:53: WBC 6.9, RBC 4.46, Hgb 12.5, Hct 37.0, MCV 83.0, MCH 28.0, MCHC 

33.8, RDW 14.6 H, Plt Count 310, MPV 9.2, Gran % 60.6, Lymph % (Auto) 29.5, 

Mono % (Auto) 6.3 H, Eos % (Auto) 3.3, Baso % (Auto) 0.3, Gran # 4.17, Lymph # 

2.0, Mono # 0.4, Eos # 0.2, Baso # 0.02











Vital Signs











  Temp Pulse Resp BP Pulse Ox


 


 10/09/17 07:00  97.6 F  95 H  19  154/95 H  96


 


 10/08/17 03:00   85   149/85 


 


 10/07/17 22:00  97.7 F  78  20  148/68  99


 


 10/07/17 17:48   78  14  156/99 H  100


 


 10/07/17 16:41   80   174/113 H 


 


 10/07/17 15:59   83  16  174/113 H  98


 


 10/07/17 14:00   83  18  151/101 H  98


 


 10/07/17 12:52   64  18  128/73  95


 


 10/07/17 10:05  98.2 F  86  18  142/87  100














 











Temp Pulse Resp BP Pulse Ox


 


 98.4 F   80   16   113/72   96 


 


 10/10/17 07:03  10/10/17 07:03  10/10/17 07:03  10/10/17 07:03  10/09/17 07:00











 











Temp Pulse Resp BP Pulse Ox


 


 98.4 F   100 H  16   140/99 H  96 


 


 10/10/17 07:03  10/11/17 09:23  10/10/17 07:03  10/11/17 09:23  10/09/17 07:00











 











Temp Pulse Resp BP Pulse Ox


 


 98.4 F   83   16   141/92 H  96 


 


 10/10/17 07:03  10/13/17 08:15  10/10/17 07:03  10/13/17 08:15  10/09/17 07:00











 











Temp Pulse Resp BP Pulse Ox


 


 98.0 F   72   16   122/83   96 


 


 10/17/17 07:41  10/17/17 09:38  10/17/17 07:41  10/17/17 09:38  10/14/17 06:53








DSM 5 Symptoms Update: 





Shortly pt is 38yo AAF with long h/o mental illness, h/o multiple admissions in 

the psychiatric inpatient unit [place] [facility] [place], [place] [facility] [place] ago), pt also has h/o substance use disorder, pt was admitted 

for evaluation and stabilization of disorganized, psychotic behavior, pt was 

found wondering on the streets with no shoes and pants on, pt was disorganized 

and psychotic, pt needed to be medicated in ED as well code [place] was called 

over night in the psych inpatient unit, pt has chronic noncompliance with 

medications and follow up appointments. 





pt was seen today at the treatment team meeting, there is some positive changes 

with pt's presentation, pt is less manic, thought process better organized, 

still circumstantial though. 





as per staff pt takes meds, no behavioral incidents, took a shower after strong 

encouragement, still smells badly, pt also appears to be sexually preoccupied. .





MSE:


Pt deemed to be unreliable historian, pt is disorganized, poor personal hygiene

, good ADLs, psychomotor agitation/retardation, speech was: mumbling, eye 

contact: no eye contact , mood described: "I feel great, I am working on a new 

poem", affect: labile, mood incongruent, thought process:disorganized, thought 

content: pt denied SI/ HI, pt was less paranoid, yesterday pt was paranoid "my 

mother wants to keep me here, she is stealing money from me", pt denied v/a/t 

hallucinations, insight/judgment: impaired, impulses are better  controlled.





Impression:


schizoaffective disorder


PCP abuse, r/o permanent neurological damage


substance induced mood disorder


alcohol use disorder


tobacco abuse





Treatment plan: 


Milieu/structure/supportive therapy


Medical consult appreciated, see medical team note for more detailed info


 consultation for discharge plan and social issues


Med management


patient currently is on one-to-one observation for safety, risk of falls, 

disorganized behavior.


Benztropine 1mg po amhs for EPS


Haldol 10mg tid for psychosis in order to start Haldol Dec, pt is noncompliant 

with meds and f/u appts


Losartan [Cozaar] 50 mg PO DAILY 


Nicotine 7 mg/24 hr [Nicoderm CQ] 1 patch TD DAILY 


tegretol 200mg po bid for mood stabilization


Family involvement


Follow up on labs


Will monitor closely


SW evaluation for d/c planning


Pt was educated about risk/benefits and alternatives of medications, coping 

strategies (safety plan, suicide prevention), relapse prevention, importance of 

follow up with psychiatrist and therapist, stay away from drugs/alcohol/smoking





Medication Change: No (started yesterday Tegretol)


Medical Record Reviewed: Yes





Mental Status Examination





- Cognitive Function


Orientation: Person, Place


Attention: Poor


Concentration: Poor


Association: Loose





- Mood


Mood: Other ( "feeling good")





- Affect


Affect: Broad





- Speech


Speech: Appropriate (rushed but less pressured)





- Formal Thought Process


Formal Thought Process: Loosening of associations (overinclusive)





- Suicidal Ideation


Suicidal Ideation: No





- Homicidal Ideation


Homicidal Ideation: No





Goal/Treatment Plan





- Goal/Treatment Plan


Need for Continued Stay: Remain at risks for inpatient hospitalization, Severe 

depression anxiety, Discharge may exacerbated symptoms, Failed transitioning, 

Severe functional impairment


Estimated Date of D/C: 10/23/17 (pt is psychotic still)

## 2017-10-20 RX ADMIN — LEVALBUTEROL SCH: 0.63 SOLUTION RESPIRATORY (INHALATION) at 01:12

## 2017-10-20 RX ADMIN — LEVALBUTEROL SCH MG: 0.63 SOLUTION RESPIRATORY (INHALATION) at 08:00

## 2017-10-20 RX ADMIN — LEVALBUTEROL SCH MG: 0.63 SOLUTION RESPIRATORY (INHALATION) at 13:34

## 2017-10-20 RX ADMIN — PANTOPRAZOLE SODIUM SCH MG: 40 TABLET, DELAYED RELEASE ORAL at 06:38

## 2017-10-20 RX ADMIN — LEVALBUTEROL SCH MG: 0.63 SOLUTION RESPIRATORY (INHALATION) at 21:30

## 2017-10-20 NOTE — PCM.BM
<Sintia Garner - Last Filed: 10/20/17 14:54>





Treatment Plan Problems





- Problems identified on initial assessmt


  ** Delusions


Date Initiated: 10/09/17


Time Initiated: 11:24


Assessment reference: NA


Priority: 1





  ** Thought Process


Date Initiated: 10/09/17


Time Initiated: 11:25


Assessment reference: NA


Status: Active


Priority: 2





  ** Agitated Behavior


Date Initiated: 10/09/17


Time Initiated: 11:25


Assessment reference: NA


Status: Active


Priority: 3





Treatment assets and liabiliti


Patient Assests: adapts well, cooperative, ADL independent, physically healthy, 

good interpersonal skills





- Milieu Protocol


Maintain good personal hygiene: daily Encourage regular showers, daily Remind 

patient to perform daily oral care, daily Assist patient to perform ADL's


Maintain personal safety: every shift Educate patient to report safety concerns 

to staff, every shift Monitor environment for contraband/sharps


Medication safety: Monitor for expected outcome, potential side effects: every 

shift, Assess barriers to learning: every shift, Assess readiness for 

medication education: every shift


Milieu Narrative: 





* c/w current tx and plan


* No new weekend labs thus far


* Vitals reviewed and noted below: 


 Selected Entries











  10/14/17 10/14/17 10/14/17





  06:53 06:59 17:28


 


Temperature 98.3 F  


 


Pulse Rate 93 H 92 H 102 H


 


Respiratory 20  





Rate   


 


Blood Pressure 147/101 H 147/101 H 137/84


 


O2 Sat by Pulse 96  





Oximetry   


 


Oxygen Delivery Room Air  





Method   














  10/14/17





  18:10


 


Temperature 


 


Pulse Rate 102 H


 


Respiratory 





Rate 


 


Blood Pressure 137/84


 


O2 Sat by Pulse 





Oximetry 


 


Oxygen Delivery 





Method 











Family Contact


Family involvement: Family/SO is involved


Family contact: Telephone contact initiated by staff





- Goals for Treatment


Patient goals for treatment: "I just want to watch this show."





Discharge/Continuing Care





- Education Needs


Education Needs: Patient Medication, Patient Diagnosis/Disease Process, Patient 

Coping Skills, Patient Health Practices/Safety, Patient Personal Hygiene/

Grooming, Patient Aftercare Safety Plan





- Discharge


Discharge Criteria: Tolerates medication w/o severe side effects, Reduction of 

target symptoms


Discharge to:: Home





- Treatment Team Participation


Patient/Family/SO Statement: 





* c/w current tx and plan


* No new weekend labs thus far


* Vitals reviewed and noted below: 


 Selected Entries











  10/14/17 10/14/17 10/14/17





  06:53 06:59 17:28


 


Temperature 98.3 F  


 


Pulse Rate 93 H 92 H 102 H


 


Respiratory 20  





Rate   


 


Blood Pressure 147/101 H 147/101 H 137/84


 


O2 Sat by Pulse 96  





Oximetry   


 


Oxygen Delivery Room Air  





Method   














  10/14/17





  18:10


 


Temperature 


 


Pulse Rate 102 H


 


Respiratory 





Rate 


 


Blood Pressure 137/84


 


O2 Sat by Pulse 





Oximetry 


 


Oxygen Delivery 





Method 











Treatment Plan Review


Patient participation: Yes





- Problem


  ** Delusions


Date Initiated: 10/20/17


Time Initiated: 14:50


Progress toward outcomes: unchanged





  ** Thought Process


Date Initiated: 10/20/17


Time Initiated: 14:50


Progress toward outcomes: unchanged





  ** Agitated Behavior


Date Initiated: 10/20/17


Time Initiated: 14:50


Progress toward outcomes: improved





<Kindra Mohr Y - Last Filed: 10/23/17 15:28>





Family Contact


Family contact name: Melva Springer(mother) [phone]


Family contacted how many times per week?: 2

## 2017-10-20 NOTE — PN
DATE:  10/19/2017



LOCATION:  The patient is seen in room 519, bed 1.



SUBJECTIVE:  The patient was participating in the recreational and day room

activity.  The patient ambulated to her room.  The patient's overnight

nurses' notes were reviewed.  The patient had overproductive speech.  The

patient had been anxious and the patient has been requesting to be

discharged home in order to secure section 8 housing.



The patient denies any suicidal, homicidal ideation.  Denies any chest

pain, shortness of breath.  Denies nausea, vomiting, diarrhea.



PHYSICAL EXAMINATION:

GENERAL:  The patient is alert, awake, oriented x3.

VITAL SIGNS:  Reviewed.  The patient is afebrile; heart rate 84, 86, 89;

respirations 18-20; O2 sat is 97%, 99%, 98% and 96%; blood pressure 128/88,

136/84, 140/91.

HEAD:  Normocephalic, atraumatic.

EENT:  Shows pink conjunctivae.  Anicteric sclerae.  No oropharyngeal

lesion.

NECK:  No neck rigidity.

CHEST:  Kyphosis.

LUNGS:  Examination shows no rales, crackles or wheezing.

CARDIOVASCULAR:  S1, S2, regular rhythm.

ABDOMEN:  Morbidly obese.

GENITALIA:  Female.

RECTAL:  Examination is deferred.

EXTREMITIES:  Show nonpitting swelling of the lower extremity.

MUSCULOSKELETAL:  Shows a body mass of greater than 50.  Gait examination

is independent.

VASCULAR:  Palpable pulses.

PSYCHIATRIC:  As per the psychiatrist's evaluation and notes.



DIAGNOSTIC:  None.



IMAGING:  None.



The _____ patient was evaluated by Psychiatry.  Their recommendations were

noted.



DIAGNOSES:

1.  Acute exacerbation of schizoaffective disorder with manic episode with

overproductive speech.

2.  Acute exacerbation of anxiety, depression.

3.  Psychosis.

4.  History of nicotine, alcohol, and polysubstance abuse and PCP abuse.

5.  Hypertension.

6.  Morbid obesity.

7.  History of nicotine dependence and asthma versus emphysema.

8.  Hypovitaminosis D.

9.  History of poor compliance.

10.  Bilateral nonpitting venous stasis of the lower extremity.

1.  Hypertension.

2.  Transient tachycardia.

3.  Acute exacerbation of schizoaffective disorder.

4.  Morbid obesity with elevated body mass index of 50.5.

5.  Nicotine, alcohol, polysubstance abuse, and PCP use and dependence.

6.  Obesity.

7.  Manic episodes.

8.  Hypovitaminosis D.

9.  Nicotine dependence.

10.  Asthma versus chronic obstructive pulmonary disease.

1.  Acute exacerbation of schizoaffective disorder with psychosis.

2.  Hypertension.

3.  Morbid obesity with elevated body mass index of almost 49.

4.  Nicotine, alcohol, and recreational drug use and PCP use.

5.  Disorganized and tangential thought disorder with overproductive

speech.

6.  Manic episodes.

7.  Anxiety disorder with overproductive pressured speech and grandiose

delusion.

8.  Possible chronic obstructive pulmonary disease or emphysema.

9.  Hypovitaminosis D.

1.  Acute exacerbation of schizoaffective disorder.

2.  Intermittent manic episode with poor insight and judgment.

3.  Insomnia.

4.  Grandiose and manic behavior.

5.  Insomnia.

6.  Poor dental hygiene.

7.  Over productive speech with manic behavior and tangential thought

process.

8.  Manic episode with racing thoughts.

9.  History of poor compliance and noncompliance.

10.  Rambling and over productive speech with racing thoughts and grandiose

delusions.

11.  Labile affect and mood with agitation with poor decision making.

12.  Noncompliance with medication.

13.  Episodic anxiety.

14.  Morbid obesity.

15.  Hypertension.

16.  History of nicotine, alcohol, polysubstance abuse and dependence.

17.  Obesity with elevated body mass index.

18.  History of phencyclidine abuse.

1.  Chronic tooth problem and probably poor dental hygiene with dental

caries.

2.  Acute exacerbation of schizoaffective disorder.

3.  Hypertension.

4.  Tachycardia.

5.  Morbid obesity with elevated body mass index.

6.  History of poor compliance and noncompliance.

7.  History of behavioral disorder.

8.  Active nicotine, alcohol, and recreational drug use and dependence, and

history of PCP use.

9.  Bilateral nonpitting lower extremity venous stasis.

10.  History of questionable chronic obstructive pulmonary disease versus

asthma.

11.  Hypovitaminosis D.

1.  Acute exacerbation of schizoaffective disorder with psychosis and

behavioral disturbances.

2.  Active nicotine, alcohol, PCP, polysubstance abuse and dependence with

substance-induced mood disorder.

3.  Morbid obesity.

4.  Hypertension.

5.  Asthma.

6.  Nicotine dependence.

7.  Transient tachycardia.

8.  Obesity with elevated body mass index of greater than 48.

9.  Hypertensive cardiovascular disease.

1.  Acute exacerbation of schizophrenia with psychosis.

2.  Active nicotine, alcohol, polysubstance abuse, and PCP abuse.

3.  Hypertension.

4.  Hypovitaminosis D.

5.  Morbid obesity.

6.  Questionable asthma.

7.  Psychosis.

8.  History of poor compliance and noncompliance with medication.

9.  Questionable behavioral disorder.

10.  Insomnia.

1.  Acute exacerbation of schizophrenia and psychosis.

2.  Questionable and possible behavioral disorder.

3.  Noncompliance and poor compliance.

4.  Super morbid obesity.

5.  Active nicotine, alcohol and drug abuse and PCP abuse and dependence.

6.  Noncompliance.

7.  Hypertension.

8.  Tachycardia.

9.  History of asthma.

10.  Hypovitaminosis D.

17.  Bilateral lower extremity trace venous stasis.

18.  Insomnia.

19.  Hypertension.

1.  Acute psychosis with episodic agitation with disorganized thought and

flat affect.

2.  Schizoaffective disorder.

3.  Active nicotine, alcohol and PCP abuse and dependence.

4.  Substance-induced mood disorder.

5.  Hypertension.

6.  Transient tachycardia, probably secondary to anxiety.

7.  Morbid obesity with elevated body mass index of 42.

8.  Insomnia.

9.  Psychosis with disorganized speech.

10.  Behavioral disorder with agitation.

1.  Acute psychosis with disorganized, tangential speech and thoughts with

gait dysfunction.

2.  Morbid obesity.

3.  Insomnia.

4.  Anxious, restless and agitated state with anxiety.

5.  History of hypertension.

6.  Morbid obesity with elevated body mass index of 42.

7.  Urine drug screen positive for PCP.

8.  Active nicotine, alcohol and drug abuse and dependence.

9.  Paranoia and auditory, visual hallucination.

10.  Poor personal hygiene.

11.  Psychomotor agitation and retardation.

12.  Substance-induced mood disorder.

13.  Schizoaffective disorder.

14.  Nicotine, alcohol PCP use abuse disorder.

15.  History of questionable asthma.

1.  Active psychosis with behavioral disorder.

2.  Active psychosis with behavioral disorder with flat affect,

disorganized speech, and tangential responses.

3.  Morbid obesity.

4.  Hypertension.

5.  Tachycardia.

6.  History of chronic noncompliance.

7.  Active PCP use.

8.  Active alcohol, tobacco abuse, and dependence.

9.  Insomnia.

1.  Questionable acute exacerbation of schizoaffective disorder with

bizarre behavior and behavioral disorder.

2.  Chronic noncompliance and poor compliance with medication.

3.  Active PCP use.

4.  Substance-induced mood disorder.

5.  Alcohol and tobacco use and abuse disorder.

6.  Morbid obesity.

7.  Transient uncontrolled hypertension.

8.  Questionable altered mental status secondary to underlying psychiatric

problem versus medications.

9.  Active nicotine, alcohol, and substance abuse disorder.

10.  Insomnia.

11.  Hypertension.

12.  Hypovitaminosis D.

13.  Active nicotine addiction.





The patient MAR from today was reviewed.  The patient is to be continued on

multiple psychiatric medications as per Dr. Calixto.  The patient will

be continued on Cozaar and Drisdol.  At present, the patient will be

continued on GI, DVT prophylaxis.  At present, the patient's discharge will

be dependent upon the Psychiatry evaluation and clearance for discharge,

which I have explained to the patient in layman's language.  All questions

and concerns answered.



Dictated and electronically signed, not read.



Signing off;





__________________________________________

George Davis MD





DD:  10/19/2017 18:30:20

DT:  10/19/2017 18:33:40

Job # [id number]





[email or web address] ANGEL

## 2017-10-20 NOTE — PN
DATE:  10/20/2017



SUBJECTIVE:  The patient is seen in dayroom.  The patient is sitting up in

the dayroom with activity therapist.  The patient is alert, awake,

responsive.  The patient continuously is requesting to be discharged home. 

I have explained to the patient that in order to be discharged home,she

needs to be cleared by the psychiatrist.  The patient is sitting up in the

chair.  The patient appears to be comfortable in no distress.  The patient

still has some questionable pressured speech versus overactive speech.



PHYSICAL EXAMINATION:

VITAL SIGNS:  T-max afebrile; heart rate 84, 86, 92; respiration 18 to 20;

O2 sat is 96, 98, 97; blood pressure 140/82, 138/84.

HEAD: Normocephalic, atraumatic.

HEENT:  Shows pink conjunctivae, anicteric sclerae.  No oropharyngeal

lesion.  No neck rigidity.

CHEST:  Examination is symmetrical.

LUNG:  Shows no rales, crackles or wheezing.

CARDIOVASCULAR:  S1 and S2, regular rhythm.

ABDOMEN:  Morbidly obese.

GENITALIA:  Female.

RECTAL:  Deferred.

EXTREMITY:  Shows nonpitting swelling of the lower extremity.

MUSCULOSKELETAL:  Shows elevated body mass index.

NEUROLOGIC:  The patient is alert, awake, responsive, is able to move upper

and lower extremities without assistance.  Gait examination was not tested.

VASCULAR:  Palpable pulses.

PSYCHIATRIC:  As per psychiatrist's note.



DIAGNOSTICS:  None from today.



Psychiatry evaluation from yesterday noted.



IMPRESSION:

1.  Acute exacerbation of schizoaffective disorder with psychosis.

2.  History of hypertension, history of nicotine dependence, history of

asthma, history of hypovitaminosis D, history of obesity.

3.  Insomnia.

4.  Anxiety disorder.

5.  Questionable depression.

6.  Morbid obesity with elevated body mass index of greater than 50.

7.  Nicotine, alcohol, and polysubstance abuse and dependence and

phencyclidine use.

1.  Acute exacerbation of schizoaffective disorder with manic episode with

overproductive speech.

2.  Acute exacerbation of anxiety, depression.

3.  Psychosis.

4.  History of nicotine, alcohol, and polysubstance abuse and PCP abuse.

5.  Hypertension.

6.  Morbid obesity.

7.  History of nicotine dependence and asthma versus emphysema.

8.  Hypovitaminosis D.

9.  History of poor compliance.

10.  Bilateral nonpitting venous stasis of the lower extremity.

1.  Hypertension.

2.  Transient tachycardia.

3.  Acute exacerbation of schizoaffective disorder.

4.  Morbid obesity with elevated body mass index of 50.5.

5.  Nicotine, alcohol, polysubstance abuse, and PCP use and dependence.

6.  Obesity.

7.  Manic episodes.

8.  Hypovitaminosis D.

9.  Nicotine dependence.

10.  Asthma versus chronic obstructive pulmonary disease.

1.  Acute exacerbation of schizoaffective disorder with psychosis.

2.  Hypertension.

3.  Morbid obesity with elevated body mass index of almost 49.

4.  Nicotine, alcohol, and recreational drug use and PCP use.

5.  Disorganized and tangential thought disorder with overproductive

speech.

6.  Manic episodes.

7.  Anxiety disorder with overproductive pressured speech and grandiose

delusion.

8.  Possible chronic obstructive pulmonary disease or emphysema.

9.  Hypovitaminosis D.

1.  Acute exacerbation of schizoaffective disorder.

2.  Intermittent manic episode with poor insight and judgment.

3.  Insomnia.

4.  Grandiose and manic behavior.

5.  Insomnia.

6.  Poor dental hygiene.

7.  Over productive speech with manic behavior and tangential thought

process.

8.  Manic episode with racing thoughts.

9.  History of poor compliance and noncompliance.

10.  Rambling and over productive speech with racing thoughts and grandiose

delusions.

11.  Labile affect and mood with agitation with poor decision making.

12.  Noncompliance with medication.

13.  Episodic anxiety.

14.  Morbid obesity.

15.  Hypertension.

16.  History of nicotine, alcohol, polysubstance abuse and dependence.

17.  Obesity with elevated body mass index.

18.  History of phencyclidine abuse.

1.  Chronic tooth problem and probably poor dental hygiene with dental

caries.

2.  Acute exacerbation of schizoaffective disorder.

3.  Hypertension.

4.  Tachycardia.

5.  Morbid obesity with elevated body mass index.

6.  History of poor compliance and noncompliance.

7.  History of behavioral disorder.

8.  Active nicotine, alcohol, and recreational drug use and dependence, and

history of PCP use.

9.  Bilateral nonpitting lower extremity venous stasis.

10.  History of questionable chronic obstructive pulmonary disease versus

asthma.

11.  Hypovitaminosis D.

1.  Acute exacerbation of schizoaffective disorder with psychosis and

behavioral disturbances.

2.  Active nicotine, alcohol, PCP, polysubstance abuse and dependence with

substance-induced mood disorder.

3.  Morbid obesity.

4.  Hypertension.

5.  Asthma.

6.  Nicotine dependence.

7.  Transient tachycardia.

8.  Obesity with elevated body mass index of greater than 48.

9.  Hypertensive cardiovascular disease.

1.  Acute exacerbation of schizophrenia with psychosis.

2.  Active nicotine, alcohol, polysubstance abuse, and PCP abuse.

3.  Hypertension.

4.  Hypovitaminosis D.

5.  Morbid obesity.

6.  Questionable asthma.

7.  Psychosis.

8.  History of poor compliance and noncompliance with medication.

9.  Questionable behavioral disorder.

10.  Insomnia.

1.  Acute exacerbation of schizophrenia and psychosis.

2.  Questionable and possible behavioral disorder.

3.  Noncompliance and poor compliance.

4.  Super morbid obesity.

5.  Active nicotine, alcohol and drug abuse and PCP abuse and dependence.

6.  Noncompliance.

7.  Hypertension.

8.  Tachycardia.

9.  History of asthma.

10.  Hypovitaminosis D.

17.  Bilateral lower extremity trace venous stasis.

18.  Insomnia.

19.  Hypertension.

1.  Acute psychosis with episodic agitation with disorganized thought and

flat affect.

2.  Schizoaffective disorder.

3.  Active nicotine, alcohol and PCP abuse and dependence.

4.  Substance-induced mood disorder.

5.  Hypertension.

6.  Transient tachycardia, probably secondary to anxiety.

7.  Morbid obesity with elevated body mass index of 42.

8.  Insomnia.

9.  Psychosis with disorganized speech.

10.  Behavioral disorder with agitation.

1.  Acute psychosis with disorganized, tangential speech and thoughts with

gait dysfunction.

2.  Morbid obesity.

3.  Insomnia.

4.  Anxious, restless and agitated state with anxiety.

5.  History of hypertension.

6.  Morbid obesity with elevated body mass index of 42.

7.  Urine drug screen positive for PCP.

8.  Active nicotine, alcohol and drug abuse and dependence.

9.  Paranoia and auditory, visual hallucination.

10.  Poor personal hygiene.

11.  Psychomotor agitation and retardation.

12.  Substance-induced mood disorder.

13.  Schizoaffective disorder.

14.  Nicotine, alcohol PCP use abuse disorder.

15.  History of questionable asthma.

1.  Active psychosis with behavioral disorder.

2.  Active psychosis with behavioral disorder with flat affect,

disorganized speech, and tangential responses.

3.  Morbid obesity.

4.  Hypertension.

5.  Tachycardia.

6.  History of chronic noncompliance.

7.  Active PCP use.

8.  Active alcohol, tobacco abuse, and dependence.

9.  Insomnia.

1.  Questionable acute exacerbation of schizoaffective disorder with

bizarre behavior and behavioral disorder.

2.  Chronic noncompliance and poor compliance with medication.

3.  Active PCP use.

4.  Substance-induced mood disorder.

5.  Alcohol and tobacco use and abuse disorder.

6.  Morbid obesity.

7.  Transient uncontrolled hypertension.

8.  Questionable altered mental status secondary to underlying psychiatric

problem versus medications.

9.  Active nicotine, alcohol, and substance abuse disorder.

10.  Insomnia.

11.  Hypertension.

12.  Hypovitaminosis D.

13.  Active nicotine addiction.



PLAN:  At this time, the patient is to be continued on the medications as

per the MAR which was reviewed.  The patient is to be followed up by

Psychiatry for further disposition regarding discharge clearance to home.



The patient's other medications will be continued as per the MAR which is

reviewed.



The patient's repeat vital signs were reviewed.  Blood pressure 137/92,

pulse 84, afebrile, respiration 18 to 20, O2 sats 97% to 96%.



Dictated and electronically signed, not read.







__________________________________________

George Davis MD





DD:  10/20/2017 16:33:52

DT:  10/20/2017 16:36:17

Job # [id number]



KATHY

## 2017-10-21 LAB
APPEARANCE UR: CLEAR
BILIRUB UR-MCNC: NEGATIVE MG/DL
COLOR UR: YELLOW
GLUCOSE UR STRIP-MCNC: NEGATIVE MG/DL
KETONES UR STRIP-MCNC: NEGATIVE MG/DL
LEUKOCYTE ESTERASE UR-ACNC: NEGATIVE LEU/UL
PH UR STRIP: 6 [PH] (ref 4.7–8)
PROT UR STRIP-MCNC: NEGATIVE MG/DL
RBC # UR STRIP: NEGATIVE /UL
SP GR UR STRIP: <= 1.005 (ref 1–1.03)
UROBILINOGEN UR STRIP-ACNC: 0.2 E.U./DL

## 2017-10-21 RX ADMIN — PANTOPRAZOLE SODIUM SCH MG: 40 TABLET, DELAYED RELEASE ORAL at 08:43

## 2017-10-21 RX ADMIN — LEVALBUTEROL SCH MG: 0.63 SOLUTION RESPIRATORY (INHALATION) at 08:11

## 2017-10-21 RX ADMIN — LEVALBUTEROL SCH: 0.63 SOLUTION RESPIRATORY (INHALATION) at 23:27

## 2017-10-21 RX ADMIN — LEVALBUTEROL SCH MG: 0.63 SOLUTION RESPIRATORY (INHALATION) at 14:35

## 2017-10-21 NOTE — PCM.PYCHPN
Psychiatric Progress Note





- Psychiatric Progress Note


Patient seen today, length of contact: 25 min


Patient Chief Complaint: 


"excellent, my spirit is up"


Problems Identified/Issues Discussed: 


  I reviewed recent notes and met with patient at bedside. Patient is known to 

me from prior interviews during this admission. She remains well groomed and 

friendly.  Smiles easily. Oriented to location, month and year. Speech is still 

rushed at times. Reports that her mood is "excellent, my spirit is up". Patient 

is talkative and overinclusive but can be redirected.  Thought process remains 

a little tangential. Affect is labile however she is generally happy and in 

good control. Denies AVH, SI or HI. Patient denies any new discomfort or pain 

and indicates she has been sleeping well. She continues to tolerate her 

medications and denies side effects.





Nursing notes indicate that patient has been elevated and talkative. She is 

improving and can calm down more readily. She is becoming more organized and 

less pressured but still rambles. +racing thoughts. There were no major 

behavioral issues overnight.  


Diagnostic Results: 


schizoaffective disorder


PCP abuse


substance induced mood disorder


alcohol use disorder


tobacco abuse


 





Medication Change: No ( )


Medical Record Reviewed: Yes





Mental Status Examination





- Cognitive Function


Orientation: Person, Place


Attention: WNL


Concentration: Poor


Association: Loose





- Mood


Mood: Other ( "excellent, my spirit is up")





- Affect


Affect: Broad





- Speech


Speech: Appropriate (rushed but less pressured)





- Formal Thought Process


Formal Thought Process: Loosening of associations (overinclusive)





- Suicidal Ideation


Suicidal Ideation: No





- Homicidal Ideation


Homicidal Ideation: No





Goal/Treatment Plan





- Goal/Treatment Plan


Need for Continued Stay: Remain at risks for inpatient hospitalization, Severe 

depression anxiety, Discharge may exacerbated symptoms, Failed transitioning, 

Severe functional impairment


Progress Toward Problem(s) and Goals/Treatment Plan: 





* c/w current tx and plan


* Vitals reviewed and noted below: 


 Selected Entries











  10/20/17 10/20/17 10/20/17





  06:46 09:26 16:05


 


Temperature 98.3 F  


 


Pulse Rate 84 84 99 H


 


Respiratory 16  





Rate   


 


Blood Pressure 137/92 H 137/92 H 104/72





* New weekend labs noted below:





 Laboratory Results - last 24 hr











  10/21/17





  02:15


 


Urine Color  Yellow


 


Urine Appearance  Clear


 


Urine pH  6.0


 


Ur Specific Gravity  <= 1.005


 


Urine Protein  Negative


 


Urine Glucose (UA)  Negative


 


Urine Ketones  Negative


 


Urine Blood  Negative


 


Urine Nitrate  Negative


 


Urine Bilirubin  Negative


 


Urine Urobilinogen  0.2


 


Ur Leukocyte Esterase  Negative


 


Urine HCG, Qual  Negative











  


Estimated Date of D/C: 10/23/17 (pt is psychotic still)

## 2017-10-21 NOTE — PN
DATE:  10/21/2017



SUBJECTIVE:  The patient is seen in the breakfast room on the 5B Psychiatry

floor.  The patient at present is having breakfast.  The patient states

that her mood is much better.  The patient denies any complaints of

insomnia.  The patient denies any nausea, vomiting, or diarrhea.  Denies

any shortness of breath.  Denies any chest pain.  Denies any hemoptysis,

hematemesis, or melena.  Denies any auditory or visual hallucination. 

Denies any suicidal or homicidal ideation.



PHYSICAL EXAMINATION:

VITAL SIGNS:  T-max is 98.1, pulse 76, blood pressure 128/74, respirations

16, O2 sat 99%.

HEENT:  Head examination normocephalic, atraumatic.  HEENT examination

shows pink conjunctivae, anicteric sclerae.  No oropharyngeal lesion.  No

neck rigidity.

CHEST:  Symmetrical.

LUNGS:  Shows no rales, crackles or wheezing.

CARDIOVASCULAR:  S1 and S2.  Regular rhythm.

ABDOMEN:  Morbidly obese.

GENITALIA:  Female.

RECTAL:  Deferred.

EXTREMITIES:  Shows nonpitting swelling of the lower extremity.  No calf

tenderness, no Homans sign.

MUSCULOSKELETAL:  Shows an elevated body mass index of greater than 50. 

Cranial nerves II through XII intact.  Gait examination is independent.

VASCULAR:  Palpable pulses.

PSYCHIATRIC:  As per Psychiatry evaluation.  The patient at present is

followed up by the Psychiatry.  Anticipated date of discharge is 10/23.



CURRENT MEDICATIONS:  Ativan 2 mg IM q.6 hours p.r.n., Benadryl 50 mg IM

q.6 hours p.r.n., Cogentin or benztropine 1 mg a.m. and at bedtime, Cozaar

50 mg daily, Drisdol 50,000 weekly, Haldol 5 mg IM q.6 hours p.r.n., Haldol

10 mg a.m. and at bedtime and Haldol 10 mg 4 p.m., nicotine patch 7 mg

daily, Protonix 40 mg daily, Tegretol 200 mg twice a day by Dr. Calixto, Tylenol 650 q.4 p.r.n., Xopenex nebulizer 0.63 mg 4 times a

day.



IMPRESSION:

1.  Acute exacerbation of schizoaffective disorder with psychosis.

2.  History of hypertension, history of nicotine dependence, history of

asthma, history of hypovitaminosis D, history of obesity.

3.  Insomnia.

4.  Anxiety disorder.

5.  Questionable depression.

6.  Morbid obesity with elevated body mass index of greater than 50.

7.  Nicotine, alcohol, and polysubstance abuse and dependence and

phencyclidine use.

1.  Acute exacerbation of schizoaffective disorder with manic episode with

overproductive speech.

2.  Acute exacerbation of anxiety, depression.

3.  Psychosis.

4.  History of nicotine, alcohol, and polysubstance abuse and PCP abuse.

5.  Hypertension.

6.  Morbid obesity.

7.  History of nicotine dependence and asthma versus emphysema.

8.  Hypovitaminosis D.

9.  History of poor compliance.

10.  Bilateral nonpitting venous stasis of the lower extremity.

1.  Hypertension.

2.  Transient tachycardia.

3.  Acute exacerbation of schizoaffective disorder.

4.  Morbid obesity with elevated body mass index of 50.5.

5.  Nicotine, alcohol, polysubstance abuse, and PCP use and dependence.

6.  Obesity.

7.  Manic episodes.

8.  Hypovitaminosis D.

9.  Nicotine dependence.

10.  Asthma versus chronic obstructive pulmonary disease.

1.  Acute exacerbation of schizoaffective disorder with psychosis.

2.  Hypertension.

3.  Morbid obesity with elevated body mass index of almost 49.

4.  Nicotine, alcohol, and recreational drug use and PCP use.

5.  Disorganized and tangential thought disorder with overproductive

speech.

6.  Manic episodes.

7.  Anxiety disorder with overproductive pressured speech and grandiose

delusion.

8.  Possible chronic obstructive pulmonary disease or emphysema.

9.  Hypovitaminosis D.

1.  Acute exacerbation of schizoaffective disorder.

2.  Intermittent manic episode with poor insight and judgment.

3.  Insomnia.

4.  Grandiose and manic behavior.

5.  Insomnia.

6.  Poor dental hygiene.

7.  Over productive speech with manic behavior and tangential thought

process.

8.  Manic episode with racing thoughts.

9.  History of poor compliance and noncompliance.

10.  Rambling and over productive speech with racing thoughts and grandiose

delusions.

11.  Labile affect and mood with agitation with poor decision making.

12.  Noncompliance with medication.

13.  Episodic anxiety.

14.  Morbid obesity.

15.  Hypertension.

16.  History of nicotine, alcohol, polysubstance abuse and dependence.

17.  Obesity with elevated body mass index.

18.  History of phencyclidine abuse.

1.  Chronic tooth problem and probably poor dental hygiene with dental

caries.

2.  Acute exacerbation of schizoaffective disorder.

3.  Hypertension.

4.  Tachycardia.

5.  Morbid obesity with elevated body mass index.

6.  History of poor compliance and noncompliance.

7.  History of behavioral disorder.

8.  Active nicotine, alcohol, and recreational drug use and dependence, and

history of PCP use.

9.  Bilateral nonpitting lower extremity venous stasis.

10.  History of questionable chronic obstructive pulmonary disease versus

asthma.

11.  Hypovitaminosis D.

1.  Acute exacerbation of schizoaffective disorder with psychosis and

behavioral disturbances.

2.  Active nicotine, alcohol, PCP, polysubstance abuse and dependence with

substance-induced mood disorder.

3.  Morbid obesity.

4.  Hypertension.

5.  Asthma.

6.  Nicotine dependence.

7.  Transient tachycardia.

8.  Obesity with elevated body mass index of greater than 48.

9.  Hypertensive cardiovascular disease.

1.  Acute exacerbation of schizophrenia with psychosis.

2.  Active nicotine, alcohol, polysubstance abuse, and PCP abuse.

3.  Hypertension.

4.  Hypovitaminosis D.

5.  Morbid obesity.

6.  Questionable asthma.

7.  Psychosis.

8.  History of poor compliance and noncompliance with medication.

9.  Questionable behavioral disorder.

10.  Insomnia.

1.  Acute exacerbation of schizophrenia and psychosis.

2.  Questionable and possible behavioral disorder.

3.  Noncompliance and poor compliance.

4.  Super morbid obesity.

5.  Active nicotine, alcohol and drug abuse and PCP abuse and dependence.

6.  Noncompliance.

7.  Hypertension.

8.  Tachycardia.

9.  History of asthma.

10.  Hypovitaminosis D.

17.  Bilateral lower extremity trace venous stasis.

18.  Insomnia.

19.  Hypertension.

1.  Acute psychosis with episodic agitation with disorganized thought and

flat affect.

2.  Schizoaffective disorder.

3.  Active nicotine, alcohol and PCP abuse and dependence.

4.  Substance-induced mood disorder.

5.  Hypertension.

6.  Transient tachycardia, probably secondary to anxiety.

7.  Morbid obesity with elevated body mass index of 42.

8.  Insomnia.

9.  Psychosis with disorganized speech.

10.  Behavioral disorder with agitation.

1.  Acute psychosis with disorganized, tangential speech and thoughts with

gait dysfunction.

2.  Morbid obesity.

3.  Insomnia.

4.  Anxious, restless and agitated state with anxiety.

5.  History of hypertension.

6.  Morbid obesity with elevated body mass index of 42.

7.  Urine drug screen positive for PCP.

8.  Active nicotine, alcohol and drug abuse and dependence.

9.  Paranoia and auditory, visual hallucination.

10.  Poor personal hygiene.

11.  Psychomotor agitation and retardation.

12.  Substance-induced mood disorder.

13.  Schizoaffective disorder.

14.  Nicotine, alcohol PCP use abuse disorder.

15.  History of questionable asthma.

1.  Active psychosis with behavioral disorder.

2.  Active psychosis with behavioral disorder with flat affect,

disorganized speech, and tangential responses.

3.  Morbid obesity.

4.  Hypertension.

5.  Tachycardia.

6.  History of chronic noncompliance.

7.  Active PCP use.

8.  Active alcohol, tobacco abuse, and dependence.

9.  Insomnia.

1.  Questionable acute exacerbation of schizoaffective disorder with

bizarre behavior and behavioral disorder.

2.  Chronic noncompliance and poor compliance with medication.

3.  Active PCP use.

4.  Substance-induced mood disorder.

5.  Alcohol and tobacco use and abuse disorder.

6.  Morbid obesity.

7.  Transient uncontrolled hypertension.

8.  Questionable altered mental status secondary to underlying psychiatric

problem versus medications.

9.  Active nicotine, alcohol, and substance abuse disorder.

10.  Insomnia.

11.  Hypertension.

12.  Hypovitaminosis D.

13.  Active nicotine addiction.





ASSESSMENT AND PLAN:  At present, the patient is relatively medically

stable from medical issues point of view.  The patient needs to be cleared

and stabilized from psychiatric issues prior to discharge.



Dictated and electronically signed, not read.





__________________________________________

George Davis MD





DD:  10/21/2017 16:56:29

DT:  10/21/2017 17:55:49

Job # [id number]

[email or web address] ANGEL

## 2017-10-22 RX ADMIN — PANTOPRAZOLE SODIUM SCH MG: 40 TABLET, DELAYED RELEASE ORAL at 07:13

## 2017-10-22 RX ADMIN — LEVALBUTEROL SCH: 0.63 SOLUTION RESPIRATORY (INHALATION) at 01:05

## 2017-10-22 RX ADMIN — ERGOCALCIFEROL SCH CAP: 1.25 CAPSULE ORAL at 10:42

## 2017-10-22 RX ADMIN — LEVALBUTEROL SCH MG: 0.63 SOLUTION RESPIRATORY (INHALATION) at 08:13

## 2017-10-22 RX ADMIN — LEVALBUTEROL SCH MG: 0.63 SOLUTION RESPIRATORY (INHALATION) at 14:10

## 2017-10-22 NOTE — PN
DATE:  10/22/2017



SUBJECTIVE:  The patient is seen in room 519, bed 1.  The patient is lying

in the bed.  The patient was seen in the afternoon hours.  The patient

states that she is feeling tired so she is taking a afternoon nap.  The

patient denies any suicidal, homicidal ideation.  Denies any depression. 

Denies any anxiety.  Denies any insomnia.  Overnight nurse's notes were

reviewed.



PHYSICAL EXAMINATION:

VITAL SIGNS:  T-max 98.7, pulse 84, blood pressure 114/73, O2 sat 100%.

HEAD:  Normocephalic, atraumatic.

HEENT:  Shows pink conjunctivae.  Anicteric sclerae.  No oropharyngeal

lesion.

NECK:  No neck rigidity.

CHEST:  Symmetrical.

LUNG:  Shows no rales, crackles or wheezing.

CARDIOVASCULAR:  S1, S2, regular rhythm.

ABDOMEN:  Morbidly obese.

GENITALIA:  Female.

RECTAL:  Deferred.

EXTREMITIES:  Shows chronic nonpitting venous stasis of the lower

extremity.  Gait examination is independent.

NEUROLOGICAL:  Without any gross deficit.

MUSCULOSKELETAL:  Shows a body mass index of greater than 50.



DIAGNOSTICS:  None.



IMPRESSION:

1.  Acute exacerbation of schizoaffective disorder with psychosis.

2.  Manic and delusional behavior.

3.  Hypertension (stable).

4.  Morbid obesity with elevated body mass index of greater than 50.

5.  History of nicotine, alcohol, polysubstance, PCP abuse and dependence.

6.  Hypovitaminosis D.

7.  Chronic bilateral lower extremity nonpitting venous stasis.

8.  Anxiety and depression.

9.  History of multiple inpatient psychiatric hospitalization.

10.  Psychosis.

11.  Insomnia.

12.  History of questionable asthma versus chronic obstructive pulmonary

disease and smokers lung disease.



PLAN:  At this time, the patient is to be continued on medications as per

MAR, which are:

1.  Ativan 2 mg IM q.6 hours p.r.n.

2.  Benadryl 50 mg IM q.6 hours p.r.n.

3.  The patient is on Cogentin or benztropine 1 mg a.m. and at bedtime.

4.  Cozaar 50 mg daily.

5.  Drisdol 50,000 weekly.

6.  Haldol 5 mg IM q. 6 hours. p.r.n. and Haldol 10 mg a.m., bedtime and 10

mg Haldol at 4 p.m.

7.  Nicotine patch 7 mg daily.

8.  Protonix 40 mg daily.

9.  Tegretol 200 mg twice a day.

10.  Tylenol 650 q.4 p.r.n.

11.  Xopenex 0.63 mg nebulizer every 6 hours.



The patient was advised and encouraged to increase ambulation, weight loss,

compliance with medication, compliance with recommendation by psychiatry. 

The patient is also advised outpatient close followup with the medical

physician and psychiatrist.  The patient was advised cessation of smoking,

alcohol and polysubstance drug use including PCP.  The patient was advised

weight loss.  The patient was also advised to have evaluation by bariatric

surgery for weight loss options.  All above was discussed with the patient

in layman's language.  All questions concerned answered, which she

acknowledged and understood.



Dictated and electronically signed, not read.







__________________________________________

George Davis MD





DD:  10/22/2017 17:11:52

DT:  10/22/2017 17:17:37

Job # [id number]

## 2017-10-22 NOTE — PCM.PYCHPN
Psychiatric Progress Note





- Psychiatric Progress Note


Patient seen today, length of contact: 25 min


Patient Chief Complaint: 


"excellent, my spirit is up"


Problems Identified/Issues Discussed: 


I reviewed recent notes and met with patient at bedside. She remains well 

groomed and friendly. Smiles and engages easily. She is oriented to location, 

month and year. Speech is still rushed at times. Reports that her mood is "

extra good". Patient is talkative but less overinclusive than prior interviews.

 Thought process remains a little tangential. Affect is a little labile 

however she is generally happy and in much better control. Denies AVH, SI or 

HI. Patient denies any new discomfort or pain and indicates she has been 

sleeping well. She continues to tolerate her medications and denies side 

effects. 


Nursing notes indicate that patient has been talkative, attending groups and 

more predictable. She is improving and can calm down more readily. Thoughts 

are more organized and less pressured but still rambling. I find that patient 

is easily redirectable. There were no major behavioral issues over the weekend.


  


Diagnostic Results: 


schizoaffective disorder


PCP abuse


substance induced mood disorder


alcohol use disorder


tobacco abuse


 





Medication Change: No ( )


Medical Record Reviewed: Yes





Mental Status Examination





- Cognitive Function


Orientation: Person, Place


Attention: WNL


Concentration: Poor


Association: Loose





- Mood


Mood: Other ( "extra good")





- Affect


Affect: Broad





- Speech


Speech: Appropriate (rushed but less pressured)





- Formal Thought Process


Formal Thought Process: No Impairment





- Suicidal Ideation


Suicidal Ideation: No





- Homicidal Ideation


Homicidal Ideation: No





Goal/Treatment Plan





- Goal/Treatment Plan


Need for Continued Stay: Remain at risks for inpatient hospitalization, Severe 

depression anxiety, Discharge may exacerbated symptoms, Failed transitioning, 

Severe functional impairment


Progress Toward Problem(s) and Goals/Treatment Plan: 





* c/w current tx and plan


* Appreciate f/u by Dr. Davis on 10/21/17~patient is medically stable


* Vitals reviewed and noted below:


 Selected Entries











  10/21/17 10/21/17





  10:45 15:49


 


Temperature 98.1 F 


 


Pulse Rate 76 91 H


 


Respiratory 16 





Rate  


 


Blood Pressure 128/74 115/71





 


* New weekend labs noted below:





 Laboratory Results - last 24 hr











  10/21/17





  02:15


 


Urine Color  Yellow


 


Urine Appearance  Clear


 


Urine pH  6.0


 


Ur Specific Gravity  <= 1.005


 


Urine Protein  Negative


 


Urine Glucose (UA)  Negative


 


Urine Ketones  Negative


 


Urine Blood  Negative


 


Urine Nitrate  Negative


 


Urine Bilirubin  Negative


 


Urine Urobilinogen  0.2


 


Ur Leukocyte Esterase  Negative


 


Urine HCG, Qual  Negative











  


Estimated Date of D/C: 10/23/17 (pt is psychotic still)

## 2017-10-23 VITALS — RESPIRATION RATE: 16 BRPM

## 2017-10-23 RX ADMIN — LEVALBUTEROL SCH MG: 0.63 SOLUTION RESPIRATORY (INHALATION) at 08:19

## 2017-10-23 RX ADMIN — LEVALBUTEROL SCH MG: 0.63 SOLUTION RESPIRATORY (INHALATION) at 13:49

## 2017-10-23 RX ADMIN — LEVALBUTEROL SCH: 0.63 SOLUTION RESPIRATORY (INHALATION) at 01:23

## 2017-10-23 RX ADMIN — LEVALBUTEROL SCH MG: 0.63 SOLUTION RESPIRATORY (INHALATION) at 20:16

## 2017-10-23 RX ADMIN — PANTOPRAZOLE SODIUM SCH MG: 40 TABLET, DELAYED RELEASE ORAL at 09:21

## 2017-10-23 NOTE — PN
DATE OF SERVICE:  10/23/2017



SUBJECTIVE:  The patient is seen in the day room.  The patient is sitting

up, watching TV.  The patient is alert, awake, responsive.  The patient is

seen by psychiatrist.  The patient's nurses notes from the last 24 hours

were reviewed.  The patient denied any suicidal or homicidal ideation,

denied auditory or visual hallucinations.  The patient slept well.  The

patient denies any anxiety or depression.  At present, the patient was

found to be calmer.  The patient is seen by the .



CURRENT MEDICATIONS:

1.  Cogentin 1 mg in the morning and at bedtime.

2.  Cozaar 50 mg daily.

3.  Drisdol 50,000 weekly.

4.  Haldol 10 mg in the morning and at bedtime.

5.  Haldol Decanoate 100 mg IM x1 dose given.

6.  Nicotine 7 mg patch daily.

7.  Protonix 40 mg daily.

8.  Tegretol 200 mg 3 times a day which is increased today.

9.  Xopenex 0.63 mg every 6 hours.



OBJECTIVE:

VITAL SIGNS:  T-max 98.4, 97.7, heart rate 82 to 90, blood pressure 111/65,

126/61, respiration 16, O2 saturation 98%.

HEENT:  Head, normocephalic, atraumatic.  ENT examination shows pink

conjunctivae, anicteric sclerae.  No oropharyngeal lesion.

NECK:  No neck rigidity.

CHEST:  Symmetrical.

LUNGS:  Examination shows no rales, crackles, or wheezing.

CARDIOVASCULAR:  S1 and S2, regular rhythm.

ABDOMEN:  Soft, positive bowel sounds, obese.

GENITALIA:  Female.

RECTAL:  Deferred.

EXTREMITIES:  Show chronic nonpitting swelling of the lower extremity.

MUSCULOSKELETAL:  Examination shows a body mass index of greater than 50.

NEUROLOGIC:  Cranial nerves II through XII intact.  Gait examination is

independent.

PSYCHIATRIC:  As per Psychiatry notes.



IMPRESSION:

1.  Acute exacerbation of schizoaffective disorder.

2.  Overproductive speech.

3.  History of multiple psychiatric inpatient hospitalizations.

4.  Disorganized psychotic behavior.

5.  Status post had Haldol Decanoate treatment.

6.  History of poor personal hygiene.

7.  Psychomotor agitation and retardation.

8.  History of nicotine, alcohol, and polysubstance abuse disorder.

9.  History of PCP abuse.

10.  Substance-induced mood disorder.

11.  Hypertension.

12.  Asthma.



PLAN:  At this time, the patient was seen by psychiatrist, recommends

possible discharge in the morning on 10/24/2017 as the patient received

Haldol Decanoate 100 mg dose today.  At present, the patient's case is also

referred to  for discharge planning.







__________________________________________

George Davis MD



DD:  10/23/2017 20:11:24

DT:  10/23/2017 20:16:28

Job # [id number]

## 2017-10-23 NOTE — PCM.PYCHPN
Psychiatric Progress Note





- Psychiatric Progress Note


Patient seen today, length of contact: 30 minutes


Patient Chief Complaint: 





"I'm talking fast because I have so many thoughts Dr. Veras"


Medical Problems: 





asthma, obesity








Diagnostic Results: 














 10/09/17 09:40 





 10/09/17 09:40 





 Lab Results





10/09/17 09:40: Sodium 140, Potassium 4.0, Chloride 106, Carbon Dioxide 24, 

Anion Gap 14, BUN 12, Creatinine 1.0, Est GFR (African Amer) > 60, Est GFR (Non-

Af Amer) > 60, Random Glucose 117 H, Calcium 9.2, Magnesium 2.0, Total 

Bilirubin 0.3, Direct Bilirubin 0.3, AST 20, ALT 25, Alkaline Phosphatase 79, 

Total Protein 7.3, Albumin 3.9, Globulin 3.4, Albumin/Globulin Ratio 1.1


10/09/17 09:40: WBC 6.2, RBC 4.82, Hgb 13.4, Hct 39.9, MCV 82.8, MCH 27.8, MCHC 

33.6, RDW 14.6 H, Plt Count 315, MPV 9.5, Gran % 51.1, Lymph % (Auto) 36.3 H, 

Mono % (Auto) 7.9 H, Eos % (Auto) 4.5, Baso % (Auto) 0.2, Gran # 3.19, Lymph # 

2.3, Mono # 0.5, Eos # 0.3, Baso # 0.01


10/07/17 11:05: Urine Opiates Screen Negative, Urine Methadone Screen Negative, 

Ur Barbiturates Screen Negative, Ur Phencyclidine Scrn Positive H, Ur 

Amphetamines Screen Negative, U Benzodiazepines Scrn Negative, U Oth Cocaine 

Metabols Negative, U Cannabinoids Screen Negative


10/07/17 11:05: Urine Color Straw, Urine Appearance Slight-cloudy, Urine pH 7.0

, Ur Specific Gravity <= 1.005, Urine Protein Negative, Urine Glucose (UA) 

Negative, Urine Ketones Negative, Urine Blood Negative, Urine Nitrate Negative, 

Urine Bilirubin Negative, Urine Urobilinogen 0.2, Ur Leukocyte Esterase Negative


10/07/17 10:53: Alcohol, Quantitative < 10


10/07/17 10:53: Salicylates < 1 L, Acetaminophen < 10.0 L


10/07/17 10:53: Sodium 142, Potassium 3.8, Chloride 108 H, Carbon Dioxide 26, 

Anion Gap 12, BUN 8, Creatinine 1.0, Est GFR (African Amer) > 60, Est GFR (Non-

Af Amer) > 60, Random Glucose 102, Calcium 9.0, Total Bilirubin 0.3, AST 25, 

ALT 23, Alkaline Phosphatase 106, Total Creatine Kinase 289 H, CK-MB (CK-2) 0.9

, CK-MB (CK-2) % Cancelled, Total Protein 7.2, Albumin 3.8, Globulin 3.4, 

Albumin/Globulin Ratio 1.1


10/07/17 10:53: WBC 6.9, RBC 4.46, Hgb 12.5, Hct 37.0, MCV 83.0, MCH 28.0, MCHC 

33.8, RDW 14.6 H, Plt Count 310, MPV 9.2, Gran % 60.6, Lymph % (Auto) 29.5, 

Mono % (Auto) 6.3 H, Eos % (Auto) 3.3, Baso % (Auto) 0.3, Gran # 4.17, Lymph # 

2.0, Mono # 0.4, Eos # 0.2, Baso # 0.02











Vital Signs











  Temp Pulse Resp BP Pulse Ox


 


 10/09/17 07:00  97.6 F  95 H  19  154/95 H  96


 


 10/08/17 03:00   85   149/85 


 


 10/07/17 22:00  97.7 F  78  20  148/68  99


 


 10/07/17 17:48   78  14  156/99 H  100


 


 10/07/17 16:41   80   174/113 H 


 


 10/07/17 15:59   83  16  174/113 H  98


 


 10/07/17 14:00   83  18  151/101 H  98


 


 10/07/17 12:52   64  18  128/73  95


 


 10/07/17 10:05  98.2 F  86  18  142/87  100














 











Temp Pulse Resp BP Pulse Ox


 


 98.4 F   80   16   113/72   96 


 


 10/10/17 07:03  10/10/17 07:03  10/10/17 07:03  10/10/17 07:03  10/09/17 07:00











 











Temp Pulse Resp BP Pulse Ox


 


 98.4 F   100 H  16   140/99 H  96 


 


 10/10/17 07:03  10/11/17 09:23  10/10/17 07:03  10/11/17 09:23  10/09/17 07:00











 











Temp Pulse Resp BP Pulse Ox


 


 98.4 F   83   16   141/92 H  96 


 


 10/10/17 07:03  10/13/17 08:15  10/10/17 07:03  10/13/17 08:15  10/09/17 07:00











 











Temp Pulse Resp BP Pulse Ox


 


 98.0 F   72   16   122/83   96 


 


 10/17/17 07:41  10/17/17 09:38  10/17/17 07:41  10/17/17 09:38  10/14/17 06:53











 











Temp Pulse Resp BP Pulse Ox


 


 97.7 F   82   16   111/65   98 


 


 10/23/17 06:33  10/23/17 06:33  10/23/17 06:33  10/23/17 06:33  10/22/17 15:00








DSM 5 Symptoms Update: 





Shortly pt is 36yo AAF with long h/o mental illness, h/o multiple admissions in 

the psychiatric inpatient unit (mostly in [facility] [place], [place] [facility] [place] ago[place] pt also has h/o substance use disorder, pt was admitted 

for evaluation and stabilization of disorganized, psychotic behavior, pt was 

found wondering on the streets with no shoes and pants on, pt was disorganized 

and psychotic, pt needed to be medicated in ED as well code Fulton was called 

over night in the psych inpatient unit, pt has chronic noncompliance with 

medications and follow up appointments. 





pt was seen today at the treatment team meeting, there is some positive changes 

with pt's presentation, pt is less manic, thought process better organized, 

still circumstantial though, pt has pressured speech. Pt was willing to get 

Haldol Dec, 100mg stat. . 





as per staff pt takes meds, no behavioral incidents, took a shower after strong 

encouragement, still smells badly, pt also appears to be sexually preoccupied. .





MSE:


Pt deemed to be unreliable historian, pt is disorganized, poor personal hygiene

, good ADLs, psychomotor agitation/retardation, speech was: mumbling, eye 

contact: no eye contact , mood described: "I feel great, on top of the world", 

affect: labile, mood incongruent, thought process:disorganized, thought content

: pt denied SI/ HI, less paranoid, pt denied v/a/t hallucinations, insight/

judgment: impaired, impulses are better  controlled.





Impression:


schizoaffective disorder


PCP abuse, r/o permanent neurological damage


substance induced mood disorder


alcohol use disorder


tobacco abuse





Treatment plan: 


Milieu/structure/supportive therapy


Medical consult appreciated, see medical team note for more detailed info


SW consultation for discharge plan and social issues


Med management


patient currently is on one-to-one observation for safety, risk of falls, 

disorganized behavior.


Benztropine 1mg po amhs for EPS


Haldol 10mg amhs for psychosis


Haldol Dec 100mg IM today, will be given q30 days


Losartan [Cozaar] 50 mg PO DAILY 


Nicotine 7 mg/24 hr [Nicoderm CQ] 1 patch TD DAILY 


tegretol 200mg po tid for mood stabilization


Family involvement


Follow up on labs


Will monitor closely


SW evaluation for d/c planning


Pt was educated about risk/benefits and alternatives of medications, coping 

strategies (safety plan, suicide prevention), relapse prevention, importance of 

follow up with psychiatrist and therapist, stay away from drugs/alcohol/smoking


Medication Change: Yes ( )


Medical Record Reviewed: Yes


Consults ordered or reviewed: 





medical consult appreciated





Mental Status Examination





- Cognitive Function


Orientation: Person, Place


Attention: WNL


Concentration: Poor


Association: Loose





- Mood


Mood: Other ( "extra good")





- Affect


Affect: Broad





- Speech


Speech: Appropriate (rushed but less pressured)





- Formal Thought Process


Formal Thought Process: No Impairment





- Suicidal Ideation


Suicidal Ideation: No





- Homicidal Ideation


Homicidal Ideation: No





Goal/Treatment Plan





- Goal/Treatment Plan


Need for Continued Stay: Remain at risks for inpatient hospitalization, Severe 

depression anxiety, Discharge may exacerbated symptoms, Failed transitioning, 

Severe functional impairment


Estimated Date of D/C: 10/24/17 (pt got Haldol Dec today)

## 2017-10-23 NOTE — PN
DATE:  10/20/2017



Covering for Dr. Calixto.



SUBJECTIVE:  The patient was interviewed in treatment team.



She appears to be somewhat hyper still along with being tangential.



She did report that she has a history of bipolar disorder and

schizoaffective disorder.  She is denying any psychosis presently.



Patient appears to be lacking an insight and presumably has poor judgement.



She presently is being maintained psychotropically on Cogentin 1 mg a.m.

and at bedtime, Haldol 5 mg p.r.n. along with 10 mg a.m. and at bedtime and

4 p.m.





__________________________________________

Gutierrez Anderson MD/ PhD





DD:  10/20/2017 16:07:06

DT:  10/20/2017 16:37:15

Job # [id number]

## 2017-10-24 VITALS — SYSTOLIC BLOOD PRESSURE: 123 MMHG | DIASTOLIC BLOOD PRESSURE: 73 MMHG

## 2017-10-24 VITALS — TEMPERATURE: 97.9 F | OXYGEN SATURATION: 16 % | HEART RATE: 82 BPM

## 2017-10-24 RX ADMIN — LEVALBUTEROL SCH: 0.63 SOLUTION RESPIRATORY (INHALATION) at 01:36

## 2017-10-24 RX ADMIN — LEVALBUTEROL SCH MG: 0.63 SOLUTION RESPIRATORY (INHALATION) at 14:02

## 2017-10-24 RX ADMIN — PANTOPRAZOLE SODIUM SCH MG: 40 TABLET, DELAYED RELEASE ORAL at 06:41

## 2017-10-24 RX ADMIN — LEVALBUTEROL SCH MG: 0.63 SOLUTION RESPIRATORY (INHALATION) at 08:20
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
## 2017-10-25 NOTE — PN
DATE:  10/24/2017



SUBJECTIVE:  The patient is seen in room 519, bed 1.  The patient is in the

process of getting ready to be discharged.  The patient is alert, awake,

and oriented x3.  The patient denies any anxiety or depression.  Denies any

suicidal or homicidal ideation.  Denies any auditory or visual

hallucination.  The patient's overnight nurses notes were reviewed.



PHYSICAL EXAMINATION:

VITAL SIGNS:  T-max 97.9, pulse 82, blood pressure 123/74, O2 sat 98%.

HEENT:  Head examination is normocephalic and atraumatic.  HEENT

examination shows pink conjunctivae.  Anicteric sclerae.  No oropharyngeal

lesion.

NECK:  No neck rigidity.

CHEST:  Kyphosis.

LUNGS:  Shows no rales, crackles, or wheezing.

CARDIOVASCULAR:  S1 and S2, regular rhythm.

ABDOMEN:  Morbidly obese.

GENITALIA:  Female.

RECTAL:  Deferred.

EXTREMITIES:  Shows chronic nonpitting swelling of the lower extremity. 

Gait examination is independent.

VASCULAR:  Palpable pulses.

NEUROLOGIC:  Without any gross deficit.

PSYCHIATRIC:  As per the psychiatrist's evaluation.

MUSCULOSKELETAL:  Shows a body mass index of 51.  Cranial nerves II-XII

intact.



The patient was seen by , psychiatrist.  The patient has

been cleared for discharge by above.  The patient has been advised close

followup with the medical doctor and psychiatrist.  The patient was advised

and counseled about cessation of smoking, alcohol, and recreational drug

use.  The patient was advised weight loss.  The patient was advised

outpatient evaluation with the Bariatric Surgery if the patient feels it

suitable.



The patient has been cleared for discharge by Psychiatry.



DISCHARGE MEDICATIONS:  The patient's discharge medications are Cogentin or

benztropine 1 mg a.m. and h.s., Tegretol or carbamazepine 200 mg three

times a day, Drisdol 50,000 units weekly, Haldol 10 mg a.m. and h.s., and

Haldol Decanoate 100 mg IM monthly, Cozaar 50 mg daily, nicotine patch 7 mg

daily.  In addition, the patient is also discharged on Cogentin or

benztropine 1 mg a.m. and h.s., Cozaar 50 mg daily, Drisdol 50,000 units

weekly, Haldol 10 mg a.m. and h.s., Haldol Decanoate 30 mg IM monthly,

nicotine patch 7 mg daily, Tegretol or carbamazepine 200 mg three times a

day.  The patient is to be discharged today by Psychiatry.



DISCHARGE INSTRUCTIONS:  As above.



COUNSELING:  As above.



Dictated and electronically signed, not read.







__________________________________________

George Davis MD



DD:  10/24/2017 20:40:03

DT:  10/24/2017 20:42:01

Job # [id number]

## 2018-02-05 ENCOUNTER — HOSPITAL ENCOUNTER (INPATIENT)
Dept: HOSPITAL 42 - ED | Age: 38
LOS: 14 days | Discharge: HOME | DRG: 886 | End: 2018-02-19
Attending: PSYCHIATRY & NEUROLOGY | Admitting: PSYCHIATRY & NEUROLOGY
Payer: COMMERCIAL

## 2018-02-05 VITALS — BODY MASS INDEX: 57.5 KG/M2

## 2018-02-05 DIAGNOSIS — K76.0: ICD-10-CM

## 2018-02-05 DIAGNOSIS — F25.0: ICD-10-CM

## 2018-02-05 DIAGNOSIS — O26.612: ICD-10-CM

## 2018-02-05 DIAGNOSIS — F41.9: ICD-10-CM

## 2018-02-05 DIAGNOSIS — O99.89: ICD-10-CM

## 2018-02-05 DIAGNOSIS — R73.03: ICD-10-CM

## 2018-02-05 DIAGNOSIS — O99.342: Primary | ICD-10-CM

## 2018-02-05 DIAGNOSIS — O99.312: ICD-10-CM

## 2018-02-05 DIAGNOSIS — O99.012: ICD-10-CM

## 2018-02-05 DIAGNOSIS — E87.6: ICD-10-CM

## 2018-02-05 DIAGNOSIS — E66.01: ICD-10-CM

## 2018-02-05 DIAGNOSIS — O16.2: ICD-10-CM

## 2018-02-05 DIAGNOSIS — R73.9: ICD-10-CM

## 2018-02-05 DIAGNOSIS — O99.332: ICD-10-CM

## 2018-02-05 DIAGNOSIS — Z3A.14: ICD-10-CM

## 2018-02-05 DIAGNOSIS — F17.210: ICD-10-CM

## 2018-02-05 DIAGNOSIS — O99.322: ICD-10-CM

## 2018-02-05 DIAGNOSIS — F10.10: ICD-10-CM

## 2018-02-05 DIAGNOSIS — D64.9: ICD-10-CM

## 2018-02-05 DIAGNOSIS — Z91.19: ICD-10-CM

## 2018-02-05 DIAGNOSIS — R00.0: ICD-10-CM

## 2018-02-05 DIAGNOSIS — F16.20: ICD-10-CM

## 2018-02-05 DIAGNOSIS — O99.212: ICD-10-CM

## 2018-02-05 LAB
ALBUMIN SERPL-MCNC: 3.7 G/DL (ref 3–4.8)
ALBUMIN/GLOB SERPL: 1.1 {RATIO} (ref 1.1–1.8)
ALT SERPL-CCNC: 24 U/L (ref 7–56)
APPEARANCE UR: (no result)
AST SERPL-CCNC: 26 U/L (ref 14–36)
BILIRUB UR-MCNC: NEGATIVE MG/DL
BUN SERPL-MCNC: 9 MG/DL (ref 7–21)
CALCIUM SERPL-MCNC: 9.5 MG/DL (ref 8.4–10.5)
COLOR UR: YELLOW
EPI CELLS #/AREA URNS HPF: (no result) /HPF (ref 0–5)
ERYTHROCYTE [DISTWIDTH] IN BLOOD BY AUTOMATED COUNT: 14.2 % (ref 11.5–14.5)
GFR NON-AFRICAN AMERICAN: > 60
GLUCOSE UR STRIP-MCNC: NEGATIVE MG/DL
HCG,QUALITATIVE URINE: POSITIVE
HGB BLD-MCNC: 11.3 G/DL (ref 12–16)
LEUKOCYTE ESTERASE UR-ACNC: NEGATIVE LEU/UL
MCH RBC QN AUTO: 26.8 PG (ref 25–35)
MCHC RBC AUTO-ENTMCNC: 33.3 G/DL (ref 31–37)
MCV RBC AUTO: 80.5 FL (ref 80–105)
PH UR STRIP: 6 [PH] (ref 4.7–8)
PLATELET # BLD: 277 10^3/UL (ref 120–450)
PMV BLD AUTO: 9.2 FL (ref 7–11)
PROT UR STRIP-MCNC: (no result) MG/DL
RBC # BLD AUTO: 4.21 10^6/UL (ref 3.5–6.1)
RBC # UR STRIP: NEGATIVE /UL
RBC #/AREA URNS HPF: NEGATIVE /HPF (ref 0–2)
SP GR UR STRIP: 1.02 (ref 1–1.03)
URINE NITRATE: NEGATIVE
UROBILINOGEN UR STRIP-ACNC: 0.2 E.U./DL
WBC # BLD AUTO: 7.6 10^3/UL (ref 4.5–11)
WBC #/AREA URNS HPF: (no result) /HPF (ref 0–6)
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
## 2018-02-05 NOTE — US
EXAM:

  US Abdomen Complete



EXAM DATE/TIME:

  2/5/2018 5:27 PM



CLINICAL HISTORY:

  37 years old, female; Pain; Abdominal pain; Pregnant; Additional info: 

Pregnancy and abdominal pain



TECHNIQUE:

  Real-time ultrasound of the abdomen (complete) with image documentation.



COMPARISON:

  There are no prior studies for



FINDINGS:

  Liver:  Hepatic texture is heterogeneous. There is focal decreased 

attenuation in the region of the jose hepatis.There is hepatopedal flow in the 

main portal vein.

  Gallbladder:  Gallbladder is distended with no stones, sludge or wall 

thickening.

  Common bile duct:  Common bile duct measures  approximately 4 mm in diameter.

  Pancreas:  Pancreas is partially obscured by bowel gas.

  Kidneys:  Kidneys are unremarkable.

  Spleen:  Spleen is poorly visualized.

  Aorta: Not visualized

  Inferior vena cava: Not visualized



IMPRESSION:  Limited by patient body habitus and bowel gas, no gallstones or 

ductal dilatation; fatty liver with possible focal fatty sparing in the region 

of jose hepatis



Additional nonemergent findings as described above.

## 2018-02-05 NOTE — US
EXAM:

  US Pregnancy After First Trimester, Transabdominal



EXAM DATE/TIME:

  2/5/2018 6:50 PM



CLINICAL HISTORY:

  37 years old, female; Pain; Pregnancy complicated by abdominal or pelvic 

pain; Lower; Second trimester; Gestational age or lmp: Unknown LMP Pregnant



TECHNIQUE:

  Real-time transabdominal obstetrical ultrasound of the maternal pelvis and a 

second or third trimester pregnancy with image documentation.



COMPARISON:

  US - ABDOMEN COMPLETE 2018-02-05 18:35



FINDINGS:

  Limitations:  Evaluation is limited by maternal body habitus.

  Fetus: There is a single living intrauterine gestation in variable 

presentation. There is a heart rate of 158 beats per minute

  Placenta: Placenta is posterior

  Amniotic fluid: Amnionic fluid volume appears normal

  Anatomy: Early gestational age limits evaluation of fetal anatomy



 BIOMETRICS

  Gestational age by US: 14 weeks 4 days

  EFW: 107.8 g

  BPD: 2.75 cm, 14 weeks 6 days

  HC: 9.05 cm, 14 weeks 0 days

  AC: 9.03 cm, 15 weeks 2 days

  FL: 1.56 cm, 14 weeks 4 days

 

 MATERNAL:

  Uterus:  Uterus measures approximately 11 x 7 cm.  

  

  Bladder:  Urinary bladder is incompletely distended.



IMPRESSION:  14 week 4 day single variable position fetus, estimated date of 

delivery 8/02/18

## 2018-02-05 NOTE — ED PDOC
Physical Exam


Vital Signs Reviewed: Yes


Temperature: Afebrile


Blood Pressure: Hypertensive


Pulse: Tachycardic


Respiratory Rate: Normal


Appearance: Positive for: Comfortable


Pain Distress: None


Mental Status: Positive for: Alert and Oriented X 3





<Wilfred Hilliard - Last Filed: 02/05/18 22:45>





Vital Signs











  Temp Pulse Resp BP Pulse Ox


 


 02/06/18 13:28  98.6 F  94 H  14  144/86  100


 


 02/06/18 06:00  98.5 F  79  16  137/69  100


 


 02/06/18 04:00  98.3 F  85  16  132/79  99


 


 02/06/18 02:00  98.6 F  76  17  124/68  100


 


 02/06/18 00:00  98.6 F  80  16  124/76  100


 


 02/05/18 22:00  98.3 F  85  16  137/69  99


 


 02/05/18 20:00  98.4 F  70  19  138/79  98


 


 02/05/18 18:00   91 H  18  124/74  99


 


 02/05/18 16:42   96 H  18  126/80  99


 


 02/05/18 15:10   100 H  18  125/86  99


 


 02/05/18 13:14  98.3 F  107 H  18  127/91 H  99














Medical Decision Making





- Lab Interpretations


I have reviewed the lab results: Yes





<Wilfred Hilliard - Last Filed: 02/05/18 22:45>





<Venkatesh Vitale - Last Filed: 02/06/18 18:44>


ED Course and Treatment: 


02/05/18 22:45


Dr. Veras directed psychiatric worker that she prefers to do a one on one 

evaluation in the emergency department in the morning, will observe and 

reevaluate patient until morning.





 (Wilfred Hilliard)








02/06/18 18:42


Please see the other note that was started on this patient for my additional 

comments. (Venkatesh Vitale)





- Lab Interpretations


Lab Results: 











 02/05/18 14:25 





 02/05/18 14:25 





 Lab Results





02/06/18 11:12: Alcohol, Quantitative < 10


02/05/18 16:10: Blood Type Confirm O POSITIVE


02/05/18 16:10: Urine Color Yellow, Urine Appearance Sl cloudy, Urine pH 6.0, 

Ur Specific Gravity 1.025, Urine Protein Trace H, Urine Glucose (UA) Negative, 

Urine Ketones 15 H, Urine Blood Negative, Urine Nitrate Negative, Urine 

Bilirubin Negative, Urine Urobilinogen 0.2, Ur Leukocyte Esterase Negative, 

Urine RBC Negative, Urine WBC 0 - 2, Ur Epithelial Cells Many, Urine HCG, Qual 

Positive


02/05/18 16:10: Urine Opiates Screen Negative, Urine Methadone Screen Negative, 

Ur Barbiturates Screen Negative, Ur Phencyclidine Scrn Positive H, Ur 

Amphetamines Screen Negative, U Benzodiazepines Scrn Negative, U Oth Cocaine 

Metabols Negative, U Cannabinoids Screen Negative


02/05/18 15:15: Blood Type O POSITIVE, Antibody Screen Negative, BBK History 

Checked No verified bt


02/05/18 14:25: Sodium 140, Potassium 3.5 L, Chloride 106, Carbon Dioxide 25, 

Anion Gap 13, BUN 9, Creatinine 0.7, Est GFR (African Amer) > 60, Est GFR (Non-

Af Amer) > 60, Random Glucose 129 H, Calcium 9.5, Total Bilirubin 0.2, AST 26, 

ALT 24, Alkaline Phosphatase 102, Total Protein 7.2, Albumin 3.7, Globulin 3.5, 

Albumin/Globulin Ratio 1.1


02/05/18 14:25: WBC 7.6  D, RBC 4.21, Hgb 11.3 L D, Hct 33.9 L, MCV 80.5, MCH 

26.8, MCHC 33.3, RDW 14.2, Plt Count 277, MPV 9.2











- RAD Interpretation


Radiology Orders: 











02/05/18 17:27


ABDOMEN COMPLETE [US] Stat 





02/05/18 18:50


FETAL AGE [US] Stat 














- Medication Orders


Current Medication Orders: 











Haloperidol (Haldol)  2 mg PO Q6H PRN; Protocol


   PRN Reason: Agitation


Haloperidol Lactate (Haldol)  2 mg IM Q6H PRN; Protocol


   PRN Reason: Agitation











- Scribe Statement


The provider has reviewed the documentation as recorded by the Scribe





<Wilfred Hilliard - Last Filed: 02/05/18 22:45>





<Venkatesh Vitale - Last Filed: 02/06/18 18:44>





- Scribe Statement


Abhishek Macdonald





Provider Scribe Attestation:


All medical record entries made by the Scribe were at my direction and 

personally dictated by me. I have reviewed the chart and agree that the record 

accurately reflects my personal performance of the history, physical exam, 

medical decision making, and the department course for this patient. I have 

also personally directed, reviewed, and agree with the discharge instructions 

and disposition.





 (Wilfred Hilliard)





Disposition/Present on Arrival





- Present on Arrival


Any Indicators Present on Arrival: Yes


History of DVT/PE: No


History of Uncontrolled Diabetes: No


Urinary Catheter: No


History of Decub. Ulcer: No


History Surgical Site Infection Following: None





<Wilfred Hilliard - Last Filed: 02/05/18 22:45>





- Present on Arrival


Any Indicators Present on Arrival: No


History of DVT/PE: No


History of Uncontrolled Diabetes: No


Urinary Catheter: No


History of Decub. Ulcer: No





- Disposition


Have Diagnosis and Disposition been Completed?: Yes


Disposition Time: 18:43


Patient Plan: Discharge





<Venkatesh Vitale - Last Filed: 02/06/18 18:44>





- Disposition


Diagnosis: 


 Schizophrenia, Phencyclidine-induced psychosis, Abdominal pain, Toxic 

encephalopathy





Disposition: HOME/ ROUTINE


Patient Problems: 


 Current Active Problems











Problem Status Onset


 


Abdominal pain Acute  


 


Phencyclidine-induced psychosis Acute  


 


Toxic encephalopathy Acute  


 


Schizophrenia Chronic  











Condition: GOOD


Discharge Instructions (ExitCare):  Pregnancy (ED), Polysubstance Abuse (ED)


Additional Instructions: 


Dearubio Vicente





Please follow up with your primary care doctor and obstetrician in the next 

day. 


Should you have severe pain, fever, chills, shortness of breath or chest pain, 

return to the ER for evaluation.





Take care of yourself and your baby.





All the best.


Referrals: 


Blake Justin MD [Primary Care Provider] - Follow up with primary


Forms:  [employer] (English)

## 2018-02-06 VITALS — OXYGEN SATURATION: 100 %
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
## 2018-02-07 LAB
ALBUMIN SERPL-MCNC: 3.8 G/DL (ref 3–4.8)
ALBUMIN/GLOB SERPL: 1 {RATIO} (ref 1.1–1.8)
ALT SERPL-CCNC: 29 U/L (ref 7–56)
AST SERPL-CCNC: 29 U/L (ref 14–36)
BASOPHILS # BLD AUTO: 0.02 K/MM3 (ref 0–2)
BASOPHILS NFR BLD: 0.3 % (ref 0–3)
BILIRUB DIRECT SERPL-MCNC: 0.4 MG/DL (ref 0–0.4)
BUN SERPL-MCNC: 6 MG/DL (ref 7–21)
CALCIUM SERPL-MCNC: 9.8 MG/DL (ref 8.4–10.5)
EOSINOPHIL # BLD: 0.3 10*3/UL (ref 0–0.7)
EOSINOPHIL NFR BLD: 4.1 % (ref 1.5–5)
ERYTHROCYTE [DISTWIDTH] IN BLOOD BY AUTOMATED COUNT: 14.2 % (ref 11.5–14.5)
FOLATE SERPL-MCNC: > 20 NG/ML
GFR NON-AFRICAN AMERICAN: > 60
GRANULOCYTES # BLD: 4.34 10*3/UL (ref 1.4–6.5)
GRANULOCYTES NFR BLD: 59.2 % (ref 50–68)
HDLC SERPL-MCNC: 97 MG/DL (ref 29–60)
HGB BLD-MCNC: 11.6 G/DL (ref 12–16)
LDLC SERPL-MCNC: 54 MG/DL (ref 0–129)
LYMPHOCYTES # BLD: 2.2 10*3/UL (ref 1.2–3.4)
LYMPHOCYTES NFR BLD AUTO: 29.7 % (ref 22–35)
MAGNESIUM SERPL-MCNC: 2 MG/DL (ref 1.7–2.2)
MCH RBC QN AUTO: 26.9 PG (ref 25–35)
MCHC RBC AUTO-ENTMCNC: 33 G/DL (ref 31–37)
MCV RBC AUTO: 81.4 FL (ref 80–105)
MONOCYTES # BLD AUTO: 0.5 10*3/UL (ref 0.1–0.6)
MONOCYTES NFR BLD: 6.7 % (ref 1–6)
PLATELET # BLD: 287 10^3/UL (ref 120–450)
PMV BLD AUTO: 9.4 FL (ref 7–11)
RBC # BLD AUTO: 4.31 10^6/UL (ref 3.5–6.1)
WBC # BLD AUTO: 7.3 10^3/UL (ref 4.5–11)

## 2018-02-07 RX ADMIN — POTASSIUM CHLORIDE SCH MEQ: 20 TABLET, EXTENDED RELEASE ORAL at 02:06

## 2018-02-07 RX ADMIN — THERA TABS SCH TAB: TAB at 08:57

## 2018-02-07 RX ADMIN — POTASSIUM CHLORIDE SCH MEQ: 20 TABLET, EXTENDED RELEASE ORAL at 00:12

## 2018-02-07 RX ADMIN — CLOTRIMAZOLE SCH APPLIC: 1 CREAM TOPICAL at 18:39
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
## 2018-02-07 NOTE — CP.PCM.PCO
Physician Communication Note





- Physician Communication Note


Physician Communication Note: RECOMMEND OB/GYN CONSULTATION
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
## 2018-02-07 NOTE — PCM.BM
<Salvatore Campbell - Last Filed: 02/07/18 06:45>





Treatment Plan Problems





- Problems identified on initial assessmt


  ** BIPOLAR  DISORDER


Date Initiated: 02/06/18


Time Initiated: 22:00


Assessment reference: NA


Status: Active





  ** SUBSTANCE ABUSE


Date Initiated: 02/06/18


Assessment reference: NA


Status: Active





  ** ANXIETY


Date Initiated: 02/06/18


Assessment reference: NA


Status: Active





Treatment assets and liabiliti


Patient Assests: adapts well, cooperative, educated, self-reliant, ADL 

independent, physically healthy, negotiates basic needs, cognitively intact, 

good interpersonal skills


Patient Liabilities: live alone, financial problems, dietary restrictions, 

substance abuse, medical problems





- Milieu Protocol


Maintain good personal hygiene: every other day Encourage regular showers, 

every shift Remind patient to perform daily oral care, every shift Assist 

patient to perform ADL's


Maintain personal safety: every shift Educate patient to report safety concerns 

to staff, every shift Monitor environment for contraband/sharps


Medication safety: Monitor for expected outcome, potential side effects: every 

shift, Assess barriers to learning: every shift, Assess readiness for 

medication education: every shift





Family Contact


Family contact: Patient agrees to contact





Discharge/Continuing Care





- Education Needs


Education Needs: Patient Medication, Patient Diagnosis/Disease Process, Patient 

Coping Skills, Patient Placement options, Patient Community resources, Patient 

Activities of Daily Living, Patient Nutrition, Patient Uses of Medical Equipment

, Patient Personal Hygiene/Grooming, Patient Aftercare Safety Plan





- Discharge


Discharge Criteria: Tolerates medication w/o severe side effects, Free of 

paranoid thoughts, Free of agitation, Normal sleep pattern, Ability to care for 

self





<Leandro Lainez - Last Filed: 02/07/18 10:08>





Treatment Plan Problems





- Problems identified on initial assessmt





  ** Altered Thought Process


Date Initiated: 02/07/18


Time Initiated: 10:07


Assessment reference: NA


Status: Active


Priority: 1





  ** Delusions


Date Initiated: 02/07/18


Time Initiated: 10:07


Assessment reference: NA


Status: Active


Priority: 2





  ** Agitated Behavior


Date Initiated: 02/07/18


Time Initiated: 10:08


Assessment reference: NA


Status: Active


Priority: 3





<Eda Calixto - Last Filed: 02/07/18 14:53>





- Diagnosis


(1) Schizoaffective disorder


Status: Acute   


Interventions: 





02/07/18 14:54


Psychoeducation/psychotherapy


Psychopharmacology/adjustment of medications as needed/ monitoring possible 

side effects


Evaluate pt on daily basis


Compliance with medications and follow up appointments


Long acting medication if pt is noncompliant with pill form


Suicide and homicide risk assessment and prevention, coping strategies, safety 

plan


Relapse prevention


Reduction of symptoms


Improve functional status


Possible assertive community treatment


Cognitive behavioral therapy


Family involvement


Possible social skill training as outpatient








(2) Phencyclidine-induced psychosis


Status: Acute   


Interventions: 





02/07/18 14:54


Maintaining sobriety


Relapse prevention


Possible rehabilitation


Motivational interviewing


12-step programs: AA meetings








<Kindra Mohr Y - Last Filed: 02/07/18 15:41>





Family Contact


Family involvement: Family/SO is involved


Family contact: Patient agrees to contact


Family contact name: Melva Springer(mother) [phone]


Family contacted how many times per week?: 2
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
## 2018-02-08 RX ADMIN — THERA TABS SCH TAB: TAB at 08:48

## 2018-02-08 RX ADMIN — CLOTRIMAZOLE SCH APPLIC: 1 CREAM TOPICAL at 17:43

## 2018-02-08 RX ADMIN — CLOTRIMAZOLE SCH APPLIC: 1 CREAM TOPICAL at 08:08

## 2018-02-08 RX ADMIN — AMMONIUM LACTATE SCH APPLIC: 12 CREAM TOPICAL at 10:08

## 2018-02-08 NOTE — PN
DATE:  02/07/2018



SUBJECTIVE:  The patient is seen ambulating in room 517, bed 2.  The

patient is alert, awake, responsive.  Speech is pressured.  The patient has

repetitive language, keeps repeating the same statement over and over

again.  Overnight nurse's notes were reviewed.  The patient slept on an

off.  The patient was found to be crying and stating that she does not want

her baby.  "I want my baby, but she has the right to live."  Emotional

support and assurance was offered.



PHYSICAL EXAMIATION:

VITAL SIGNS:  T-max 98.2; heart rate 95, 86, 102, 80; blood pressure

136/85, 104/64, 144/85, 138/70; respirations 20; O2 sat 100%.

GENERAL:  The patient is ambulating on the floor in the room.  The patient

is alert, awake, responsive.

HEENT:  Head examination:  Normocephalic, atraumatic.  HEENT examination

shows pinkish pale conjunctivae, anicteric sclerae.  No oropharyngeal

lesion.

NECK:  No neck rigidity.

CHEST:  Examination symmetrical.

LUNGS:  Examination shows occasional rhonchi in upper lung fields

anteriorly and posteriorly.

CARDIOVASCULAR:  Examination is S1, S2, regular rhythm.

ABDOMEN:  Morbidly obese, I could not appreciate any hepatosplenomegaly. 

No guarding.  No rigidity, no rebound tenderness.

GENITALIA:  Female.

RECTAL:  Examination is deferred.

EXTREMITIES:  Shows nonpitting swelling of the lower extremity.

MUSCULOSKELETAL:  Examination shows a body mass index of 56.  Cranial

nerves II-XII limited.  Gait examination is independent.



DIAGNOSTICS:  From 02/07, WBC 7.3, hemoglobin/hematocrit 11.6/35.1,

platelets 287.  Sodium 137, potassium 4.0, chloride 102, CO2 of 25, anion

gap 14, BUN 6, creatinine 0.7, GFR greater than 60, glucose 100, hemoglobin

A1c 5.6, calcium 9.8, magnesium 2.0.  LFTs are normal.  Cholesterol 175,

LDL 54, HDL 97.  Beta-hCG is 30,100.  TSH is 1.12.  B12 level is pending. 

At the past...  B12 is pending.  RPR is negative.



IMPRESSION AND PLAN:

1.  Acute exacerbation of schizoaffective disorder with bipolar type.

2.  Acute psychosis.

3.  Normocytic anemia.

4.  Hypokalemia.

5.  Hyperglycemia.

6.  Trace proteinuria.

7.  Super morbid obesity with elevated body mass index of 56.

8.  Nicotine, alcohol and polysubstance abuse and dependence and

phencyclidine dependence.

9.  Positive beta-HCG with intrauterine live pregnancy.

10.  Hepatic steatosis.

11.  History of schizophrenia.

12.  Tenia pedis.



PLAN:  At this time, the patient was seen on the Psychiatry Floor.  I would

again recommend the patient needs an OB/GYN evaluation.  The patient has

been seen by the podiatrist.  The patient has been requested OB/GYN

evaluation by the Psychiatry.  The patient has been ordered Podiatry

evaluation.



CURRENT MEDICATIONS:

1.  Benadryl 25 mg IM q. 6 hours p.r.n. and Benadryl 50 mg at bedtime

p.r.n.

2.  Folic acid 1 mg p.o. daily.

3.  Haldol 5 mg IM q. 6 hours p.r.n., Haldol 5 mg 10:00 a.m., 04:00 p.m.,

10:00 p.m.

4.  Lac-Hydrin lotion daily to both feet.

5.  Lotrimin cream to the affected area in both feet twice a day.

6.  Nicotine patch 21 mg daily.

7.  Multivitamin one tablet daily.

8.  Xopenex nebulizer 0.63 mg q. 6 hours as needed.



The patient's is on heart healthy diet.  At present, the patient's further

management will be dependent upon the patient's clinical condition,

hemodynamic status, and as per the patient's response to therapeutic

intervention.  The patient's case was referred to  for

discharge planning.  According to the Social Service notes, the patient has

been actively using PCP.



Dictated and electronically signed, not read.

Signing off





__________________________________________

George Davis MD



DD:  02/07/2018 21:10:18

DT:  02/07/2018 21:16:42

Job # [id number]

## 2018-02-08 NOTE — CARD
--------------- APPROVED REPORT --------------





EKG Measurement

Heart [id number]

IA [id number]

[id number]

[id number]

[id number]



<Conclusion>

Normal sinus rhythm

Normal ECG

## 2018-02-08 NOTE — PCM.PYCHPN
Psychiatric Progress Note





- Psychiatric Progress Note


Patient seen today, length of contact: 30min


Patient Chief Complaint: 


"I wrote a song, record #[id number], do you want to hear it:


did you think about us?,did you think about us?,did you think about us?,did you 

think about us?,did you think about us?,did you think about us?," pt was keep 

going and going





Problems Identified/Issues Discussed: 


Suicide/ homicide prevention, past psychiatric h/o, current psychiatric symptoms

, medical problems, risk/benefits and alternatives of medications, medications 

compliance, coping strategies, substance abuse h/o, relapse prevention, 

importance of follow up with psychiatrist and therapist, discharge plan. 





Medical Problems: 


pt has obesity


foot fungal infection


pt is pregnant


see  note for more detailed information





Diagnostic Results: 














 18 07:00 





 18 07:00 





 Lab Results





18 08:15: Vitamin B12 575


18 07:00: Hemoglobin A1c 5.6


18 07:00: WBC 7.3, RBC 4.31, Hgb 11.6 L, Hct 35.1 L, MCV 81.4, MCH 26.9, 

MCHC 33.0, RDW 14.2, Plt Count 287, MPV 9.4, Gran % 59.2, Lymph % (Auto) 29.7, 

Mono % (Auto) 6.7 H, Eos % (Auto) 4.1, Baso % (Auto) 0.3, Gran # 4.34, Lymph # (

Auto) 2.2, Mono # (Auto) 0.5, Eos # (Auto) 0.3, Baso # (Auto) 0.02


18 07:00: RPR Nonreactive


18 07:00: TSH 3rd Generation 1.12, Beta HCG, Quant 26416.00 H


18 07:00: Sodium 137, Potassium 4.0, Chloride 102, Carbon Dioxide 25, 

Anion Gap 14, BUN 6 L, Creatinine 0.7, Est GFR (African Amer) > 60, Est GFR (Non

-Af Amer) > 60, Random Glucose 100, Fasting Glucose 100, Calcium 9.8, Magnesium 

2.0, Total Bilirubin 0.4, Direct Bilirubin 0.4, AST 29, ALT 29, Alkaline 

Phosphatase 71, Total Protein 7.6, Albumin 3.8, Globulin 3.8, Albumin/Globulin 

Ratio 1.0 L, Triglycerides 54, Cholesterol 175, LDL Cholesterol Direct 54, HDL 

Cholesterol 97 H, Folate > 20.0


18 11:12: Alcohol, Quantitative < 10


18 16:10: Blood Type Confirm O POSITIVE


18 16:10: Urine Color Yellow, Urine Appearance Sl cloudy, Urine pH 6.0, 

Ur Specific Gravity 1.025, Urine Protein Trace H, Urine Glucose (UA) Negative, 

Urine Ketones 15 H, Urine Blood Negative, Urine Nitrate Negative, Urine 

Bilirubin Negative, Urine Urobilinogen 0.2, Ur Leukocyte Esterase Negative, 

Urine RBC Negative, Urine WBC 0 - 2, Ur Epithelial Cells Many, Urine HCG, Qual 

Positive


18 16:10: Urine Opiates Screen Negative, Urine Methadone Screen Negative, 

Ur Barbiturates Screen Negative, Ur Phencyclidine Scrn Positive H, Ur 

Amphetamines Screen Negative, U Benzodiazepines Scrn Negative, U Oth Cocaine 

Metabols Negative, U Cannabinoids Screen Negative


18 15:15: Blood Type O POSITIVE, Antibody Screen Negative, BBK History 

Checked No verified bt


18 14:25: Sodium 140, Potassium 3.5 L, Chloride 106, Carbon Dioxide 25, 

Anion Gap 13, BUN 9, Creatinine 0.7, Est GFR (African Amer) > 60, Est GFR (Non-

Af Amer) > 60, Random Glucose 129 H, Calcium 9.5, Total Bilirubin 0.2, AST 26, 

ALT 24, Alkaline Phosphatase 102, Total Protein 7.2, Albumin 3.7, Globulin 3.5, 

Albumin/Globulin Ratio 1.1


18 14:25: WBC 7.6  D, RBC 4.21, Hgb 11.3 L D, Hct 33.9 L, MCV 80.5, MCH 

26.8, MCHC 33.3, RDW 14.2, Plt Count 277, MPV 9.2











Vital Signs











  Temp Pulse Resp BP Pulse Ox


 


 18 16:00   95 H   136/85 


 


 18 07:09  97.9 F  86  20  104/64 


 


 18 22:42  98.2 F  102 H  20  144/85 


 


 18 22:16    20  


 


 18 19:00  97.8 F  80  18  138/70  100


 


 18 17:07  98.9 F  80  16  147/83  100


 


 18 13:28  98.6 F  94 H  14  144/86  100


 


 18 06:00  98.5 F  79  16  137/69  100


 


 18 04:00  98.3 F  85  16  132/79  99


 


 18 02:00  98.6 F  76  17  124/68  100


 


 18 00:00  98.6 F  80  16  124/76  100


 


 18 22:00  98.3 F  85  16  137/69  99


 


 18 20:00  98.4 F  70  19  138/79  98


 


 18 18:00   91 H  18  124/74  99


 


 18 16:42   96 H  18  126/80  99


 


 18 15:10   100 H  18  125/86  99


 


 18 13:14  98.3 F  107 H  18  127/91 H  99











DSM 5 Symptoms Update: 


Shortly pt is 36yo AAF with long h/o mental illness, h/o multiple admissions in 

the psychiatric inpatient unit (mostly in [facility], recent admission to [facility] in 

2017), pt also has h/o substance use disorder, pt was admitted for 

evaluation and stabilization of disorganized, psychotic behavior, initially pt 

came for evaluation of abdominal pain, in ED pt presented to be disorganized, 

psychotic, this writer suggested [facility] screening, during awaiting for [facility] 

screening pt required to be medicated with Haldol 2mg PO, then pt was evaluated 

and pt was found to be not committable, pt was offered voluntary admission to 

[facility], pt agreed to sign in, then over night pt decided to leave the hospital and 

signed 48hr notice. 





pt was seen and examined at am at the treatment team room.


pt presented better in her hygiene and mild improvement with her thought 

process. 


pt still disorganized, grandiose, said that she is writing songs, pt identify 

her self with JZ, pt said that she is very creative and talented, "look 

, this is my creation, I am very proud of myself", pt sowed piece of paper with 

simple [place] and line beneath, when this writer asked what is this stand for, 

pt said "don't you know? it represents me".





pt 's speech is very disorganized, and repetitive. 





pt rescinded 48hr notice today. 





family meeting requested. 





as per staff pt is compliant with meds and unit rules and regulations.





OB and Gyn did not evaluate pt yet, RN was advised to call and request the 

consult again. 


Podiatry consult called. 





pt tolerates meds well, no side effects observed or reported, AIMS 0, no EPS.





yesterday pt wanted to have an , but today she wants to keep pregnancy. 


pt was educated about risk/benefits and alternatives of meds. 





Impression:


Schizoaffective disorder


PCP abuse





Medication Change: Yes (resumed)


Medical Record Reviewed: Yes


Consults ordered or reviewed: 





medical and podiatry consults appreciated








Mental Status Examination





- Cognitive Function


Orientation: Person


Memory: Impaired


Attention: Poor


Concentration: Poor


Association: Loose


Fund of Knowledge: Poor





- Mood


Mood: Depressed ("I am not depressed")





- Affect


Affect: Other (labile)





- Speech


Speech: Pressured





- Formal Thought Process


Formal Thought Process: Delusions, Paranoia, Loosening of associations, 

Circumstantial, Perservation





- Suicidal Ideation


Suicidal Ideation: No





- Homicidal Ideation


Homicidal Ideation: No





Goal/Treatment Plan





- Goal/Treatment Plan


Need for Continued Stay: Remain at risks for inpatient hospitalization, Severe 

depression anxiety, Discharge may exacerbated symptoms, Failed transitioning, 

Severe functional impairment


Progress Toward Problem(s) and Goals/Treatment Plan: 





Milieu/structure/supportive therapy


meds confirmed


OB&Gyn called (pt is psychotic, disorganized, pt wants to have an , 

this wrier cannot exclude pt might change her mind and wants to keep pregnancy)

. 


Podiatry consult called


Haldol 5mg po tid continued for psychosis (pt was on 5mg po am and 10mg po hs, 

confirmed, see HPI)


PRN meds


benadryl as needed for insomnia


MVI (prenatal)


Folic acid


SW evaluation


pt submitted 48hr notice, pt was screened by [facility] in ED, was not found to be 

committable


Family involvement


will monitor closely


Pt was educated about risk/benefits and alternatives of medications, coping 

strategies (safety plan, suicide prevention), relapse prevention, importance of 

follow up with psychiatrist and therapist, stay away from drugs/alcohol/smoking


Estimated Date of D/C: 18

## 2018-02-09 RX ADMIN — AMMONIUM LACTATE SCH APPLIC: 12 CREAM TOPICAL at 07:54

## 2018-02-09 RX ADMIN — CLOTRIMAZOLE SCH APPLIC: 1 CREAM TOPICAL at 17:05

## 2018-02-09 RX ADMIN — CLOTRIMAZOLE SCH APPLIC: 1 CREAM TOPICAL at 07:54

## 2018-02-09 RX ADMIN — THERA TABS SCH TAB: TAB at 07:56

## 2018-02-09 RX ADMIN — DIPHENHYDRAMINE HYDROCHLORIDE PRN MG: 50 INJECTION INTRAMUSCULAR; INTRAVENOUS at 14:42
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
## 2018-02-09 NOTE — PN
DATE:  2018



SUBJECTIVE:  The patient is seen in hallway of the psychiatry floor 517. 

The patient is also seen and examined in the room 517, bed 2.  Patient is

constantly repeating the same sentence, stating "how are you Dr. Davis, how

are you Dr. Davis, how are you Dr. Davis."  Patient is constantly repeating

the sentence.  In addition, I asked the patient about the patient's review

of symptoms.  The patient denies any chest pain, denies shortness of

breath, denies nausea, vomiting, diarrhea or constipation.  In between, the

patient also stated that she is worried about the baby in her pregnant

state.  Overnight nurse's notes were reviewed.  Fetal heart sound was

monitored.  The patient's case was referred.  The patient was seen by the

.  According to the nurses' note, the patient urinated on

the floor.



PHYSICAL EXAMINATION:

VITAL SIGNS:  T-max 97.9; heart rate 86, 95; blood pressure 142/91, 136/85,

144/85; respiration 20; O2 sat is 100%.

HEENT:  The patient's head examination normocephalic, atraumatic.  Pinkish

pale conjunctivae.  Anicteric sclerae.  No oropharyngeal lesion.

NECK:  No neck rigidity.

CHEST:  Kyphosis.

LUNGS:  No rales, crackles or wheezing.

CARDIOVASCULAR:  S1, S2, regular rhythm.

ABDOMEN:  Morbidly obese.

EXTREMITIES:  Lower extremity shows chronic nonpitting swelling, no edema

noted.

MUSCULOSKELETAL:  Body mass index of 56.

NEUROLOGIC:  The patient is alert, awake, responsive, ambulating

independently.  Gait examination is independent.  Speech appears to be

pressured and repetitive.



DIAGNOSTIC DATA:  None from today.  B12 level is back, which is 575.  A1c

5.6.  RPR is nonreactive.



IMPRESSION AND PLAN:

1.  Schizoaffective bipolar type disorder.

2.  Repetitive disorganized speech.

3.  Phencyclidine abuse.

4.  Hypertension.

5.  Questionable behavioral disorder.

6.  Normocytic anemia.

7  _____.

8.  Questionable prediabetic with hemoglobin A1c of 5.6.

9.  Trace proteinuria and ketonuria.

10.  Urine drug screen positive for phencyclidine.

11.  Intrauterine live 14-week gestation and pregnancy.

12.  Morbid obesity with poor compliance.

13.  Super morbid obesity with body mass index of 56.

14.  Tenia pedis.

15.  Poor compliance and noncompliance.

16.  History of multiple inpatient psychiatric hospitalizations.

17.  Psychosis and disorganized.

18.  Alcohol and nicotine and drug abuse and dependence and phencyclidine

dependence.



PLAN:  At this time, the patient is seeking .  The patient is still

awaiting OB/GYN evaluation.



CURRENT MEDICATIONS: Benadryl 25 mg p.o. at bedtime, Benadryl 25 mg IM q. 6

hours p.r.n., folic acid 1 mg daily Haldol 5 mg IM q .6 hours, Haldol 10 mg

a.m.`and at bedtime, Lac-Hydrin lotion to the affected area, Lotrimin cream

1% twice a day, nicotine patch 21 mg daily, multivitamin 1 tablet daily,

Xopenex nebulizer 0.63 mg every 6 hours.



At present, the patient is otherwise medically stable.



Dictated and electronically signed, not read.







__________________________________________

George Davis MD



DD:  2018 20:37:55

DT:  2018 20:49:49

Job # [id number]

## 2018-02-09 NOTE — PCM.PYCHPN
Psychiatric Progress Note





- Psychiatric Progress Note


Patient seen today, length of contact: 30min


Patient Chief Complaint: 


" , ......, ......, ......, ......, 

......, ......, ......, ......,  , ......"


Problems Identified/Issues Discussed: 


Suicide/ homicide prevention, past psychiatric h/o, current psychiatric symptoms

, medical problems, risk/benefits and alternatives of medications, medications 

compliance, coping strategies, substance abuse h/o, relapse prevention, 

importance of follow up with psychiatrist and therapist, discharge plan. 





Medical Problems: 


pt has obesity


foot fungal infection


pt is pregnant


see  note for more detailed information





Diagnostic Results: 














 18 07:00 





 18 07:00 





 Lab Results





18 08:15: Vitamin B12 575


18 07:00: Hemoglobin A1c 5.6


18 07:00: WBC 7.3, RBC 4.31, Hgb 11.6 L, Hct 35.1 L, MCV 81.4, MCH 26.9, 

MCHC 33.0, RDW 14.2, Plt Count 287, MPV 9.4, Gran % 59.2, Lymph % (Auto) 29.7, 

Mono % (Auto) 6.7 H, Eos % (Auto) 4.1, Baso % (Auto) 0.3, Gran # 4.34, Lymph # (

Auto) 2.2, Mono # (Auto) 0.5, Eos # (Auto) 0.3, Baso # (Auto) 0.02


18 07:00: RPR Nonreactive


18 07:00: TSH 3rd Generation 1.12, Beta HCG, Quant 97170.00 H


18 07:00: Sodium 137, Potassium 4.0, Chloride 102, Carbon Dioxide 25, 

Anion Gap 14, BUN 6 L, Creatinine 0.7, Est GFR (African Amer) > 60, Est GFR (Non

-Af Amer) > 60, Random Glucose 100, Fasting Glucose 100, Calcium 9.8, Magnesium 

2.0, Total Bilirubin 0.4, Direct Bilirubin 0.4, AST 29, ALT 29, Alkaline 

Phosphatase 71, Total Protein 7.6, Albumin 3.8, Globulin 3.8, Albumin/Globulin 

Ratio 1.0 L, Triglycerides 54, Cholesterol 175, LDL Cholesterol Direct 54, HDL 

Cholesterol 97 H, Folate > 20.0


18 11:12: Alcohol, Quantitative < 10


18 16:10: Blood Type Confirm O POSITIVE


18 16:10: Urine Color Yellow, Urine Appearance Sl cloudy, Urine pH 6.0, 

Ur Specific Gravity 1.025, Urine Protein Trace H, Urine Glucose (UA) Negative, 

Urine Ketones 15 H, Urine Blood Negative, Urine Nitrate Negative, Urine 

Bilirubin Negative, Urine Urobilinogen 0.2, Ur Leukocyte Esterase Negative, 

Urine RBC Negative, Urine WBC 0 - 2, Ur Epithelial Cells Many, Urine HCG, Qual 

Positive


18 16:10: Urine Opiates Screen Negative, Urine Methadone Screen Negative, 

Ur Barbiturates Screen Negative, Ur Phencyclidine Scrn Positive H, Ur 

Amphetamines Screen Negative, U Benzodiazepines Scrn Negative, U Oth Cocaine 

Metabols Negative, U Cannabinoids Screen Negative


18 15:15: Blood Type O POSITIVE, Antibody Screen Negative, BBK History 

Checked No verified bt


18 14:25: Sodium 140, Potassium 3.5 L, Chloride 106, Carbon Dioxide 25, 

Anion Gap 13, BUN 9, Creatinine 0.7, Est GFR (African Amer) > 60, Est GFR (Non-

Af Amer) > 60, Random Glucose 129 H, Calcium 9.5, Total Bilirubin 0.2, AST 26, 

ALT 24, Alkaline Phosphatase 102, Total Protein 7.2, Albumin 3.7, Globulin 3.5, 

Albumin/Globulin Ratio 1.1


18 14:25: WBC 7.6  D, RBC 4.21, Hgb 11.3 L D, Hct 33.9 L, MCV 80.5, MCH 

26.8, MCHC 33.3, RDW 14.2, Plt Count 277, MPV 9.2











Vital Signs











  Temp Pulse Resp BP Pulse Ox


 


 18 16:00   95 H   136/85 


 


 18 07:09  97.9 F  86  20  104/64 


 


 18 22:42  98.2 F  102 H  20  144/85 


 


 18 22:16    20  


 


 18 19:00  97.8 F  80  18  138/70  100


 


 18 17:07  98.9 F  80  16  147/83  100


 


 18 13:28  98.6 F  94 H  14  144/86  100


 


 18 06:00  98.5 F  79  16  137/69  100


 


 18 04:00  98.3 F  85  16  132/79  99


 


 18 02:00  98.6 F  76  17  124/68  100


 


 18 00:00  98.6 F  80  16  124/76  100


 


 18 22:00  98.3 F  85  16  137/69  99


 


 18 20:00  98.4 F  70  19  138/79  98


 


 18 18:00   91 H  18  124/74  99


 


 18 16:42   96 H  18  126/80  99


 


 18 15:10   100 H  18  125/86  99


 


 18 13:14  98.3 F  107 H  18  127/91 H  99














 Laboratory Results - last 24 hr











  18





  10:20


 


Total Creatine Kinase  150











DSM 5 Symptoms Update: 





Shortly pt is 38yo AAF with long h/o mental illness, h/o multiple admissions in 

the psychiatric inpatient unit (mostly in [facility] [place], recent admission to [facility] in 

2017), pt also has h/o substance use disorder, pt was admitted for 

evaluation and stabilization of disorganized, psychotic behavior, initially pt 

came for evaluation of abdominal pain, in ED pt presented to be disorganized, 

psychotic, this writer suggested [facility] screening, during awaiting for [facility] 

screening pt required to be medicated with Haldol 2mg PO, then pt was evaluated 

and pt was found to be not committable, pt was offered voluntary admission to 

[facility], pt agreed to sign in, then over night pt decided to leave the hospital and 

signed 48hr notice. 





pt was seen and examined at am at the [facility] area, pt seems to be little better

, thought process is little more organized. at the same time pt could urinate 

on the floor or describe her bowl movements in details. over night pt submitted 

48hr notice then rescinded it (this is second time already, pt seems to be 

undecisive). In regards of her pregnancy, pt wanted to have an  at the 

time of admission, today pt wants to keep the pregnancy, from this writer 

perspective pt needs to be more stabilized from the mental stand point and make 

right decision for herself. 





family meeting with pt's mother Melva Sutton [phone] took place today at 

am


pt gave permission to talk to her and disclose info


as per mother "my daughter is not in right stage of mind now", pt was lost in 

the community about a week ago, "I cannot take her home in this condition", pt'

s mother said that pt got section 8apt and apt will be ready by the next week. 

mother said that pt was "doing just fine up until age of 20, but she was 

introduced to the drugs", usually it takes up to 3 weeks for the patient to 

start feeling better. Pt's mother said that when pt was better she wanted to 

terminate her pregnancy and even have a tubal ligation. 


pt's mother also let this writer know that she is a POA for the pt, this writer 

advised mother to brig documents and staff was educated to file it in the 

chart. 





as per staff pt is compliant with meds and unit rules and regulations, but 

yesterday pt was agitated over night because she was hungry.





OB and Gyn team evaluated pt, consult, input appreciated. see notes.


Podiatry consult appreciated. 


medical consult appreciated.





pt tolerates meds well, no side effects observed or reported, AIMS 0, no EPS.





pt was educated about risk/benefits and alternatives of meds. 





Impression:


Schizoaffective disorder


PCP abuse





Medication Change: Yes (haldol was increased by 5mg)


Medical Record Reviewed: Yes


Consults ordered or reviewed: 





medical and podiatry consults appreciated


OB consult appreciated











Mental Status Examination





- Cognitive Function


Orientation: Person


Memory: Impaired


Attention: Poor


Concentration: Poor


Association: Loose


Fund of Knowledge: Poor





- Mood


Mood: Depressed ("I am not depressed")





- Affect


Affect: Other (labile)





- Speech


Speech: Pressured





- Formal Thought Process


Formal Thought Process: Delusions (delusions of grandour), Loosening of 

associations, Circumstantial, Perservation





- Suicidal Ideation


Suicidal Ideation: No





- Homicidal Ideation


Homicidal Ideation: No





Goal/Treatment Plan





- Goal/Treatment Plan


Need for Continued Stay: Remain at risks for inpatient hospitalization, Severe 

depression anxiety, Discharge may exacerbated symptoms, Failed transitioning, 

Severe functional impairment


Progress Toward Problem(s) and Goals/Treatment Plan: 





Milieu/structure/supportive therapy


meds confirmed


OB&Gyn called, input appreciated 


Podiatry consult called, consult appreciated


Haldol 5mg po tid will be increased to 10mg amhs for psychosis


PRN meds


benadryl as needed for insomnia


MVI (prenatal)


Folic acid


SW evaluation


pt submitted 48hr noticeX 2, but rescined


Pt was screened by [facility] in ED, was not found to be committable


Family involvement


will monitor closely


family meeting appreciated, awaiting for the legal documents indicating pt's 

mother is POA


Pt was educated about risk/benefits and alternatives of medications, coping 

strategies (safety plan, suicide prevention), relapse prevention, importance of 

follow up with psychiatrist and therapist, stay away from drugs/alcohol/smoking


Estimated Date of D/C: 18

## 2018-02-10 RX ADMIN — AMMONIUM LACTATE SCH APPLIC: 12 CREAM TOPICAL at 09:14

## 2018-02-10 RX ADMIN — CLOTRIMAZOLE SCH APPLIC: 1 CREAM TOPICAL at 16:05

## 2018-02-10 RX ADMIN — THERA TABS SCH TAB: TAB at 09:13

## 2018-02-10 RX ADMIN — CLOTRIMAZOLE SCH APPLIC: 1 CREAM TOPICAL at 09:14

## 2018-02-10 NOTE — PN
DATE:  02/10/2018



SUBJECTIVE:  The patient is seen ambulating in the hallway on the

psychiatry floor.  The patient is alert, awake, responsive.  The patient is

crying in the hallway.  Overnight nurse's notes were reviewed.  The

patient's behavior was found to be cooperative, socializing.



PHYSICAL EXAMINATION:

GENERAL:  The patient was found to be crying.

VITAL SIGNS:  T-max 98; heart rate 101/115; blood pressure 140/103, 121/76;

respirations 20 and O2 sat 100%.

HEENT:  Head Examination normocephalic, atraumatic.  HEENT examination

shows pinkish conjunctivae.  Anicteric sclerae.  No oropharyngeal lesion.

NECK:  No neck rigidity.  Neck is short and supple.

CHEST:  Kyphosis.

LUNGS:  Examination shows no rales, crackles or wheezing.

CARDIOVASCULAR:  S1, S2.  Regular rhythm.  No audible murmur, gallop or rub

noted at this time.

ABDOMEN:  Morbidly obese.  Blood type is O+.

NEUROLOGIC:  Cranial nerves II through XII intact.



DIAGNOSTIC DATA:  None from today.



IMPRESSION AND PLAN:

1.  Schizoaffective bipolar disorder.

2.  Repetitive disorganized speech.

3.  Nicotine, alcohol and phencyclidine abuse.

4.  Morbid obesity.

5.  Hypertension.

6.  Questionable behavioral disorder.

7.  Normocytic anemia.

8.  Prediabetes with hemoglobin A1c of 5.6.

9.  Tachycardia.

10.  Anxiety.

11.  Normocytic anemia.

12.  Hypokalemia.

13.  Trace proteinuria and ketonuria.

14.  Urine drug screen positive for phencyclidine.

15.  Intrauterine live 14-week gestation and pregnancy.

16.  Super morbid obesity with body mass index of 56.

17.  Tenia pedis.

18.  Poor compliance and noncompliance.

19.  History of multiple inpatient psychiatric hospitalizations.

20.  Psychosis and disorganized thought disorder.

21.  Alcohol, nicotine and polysubstance drug abuse dependence and

phencyclidine use and dependence.

22.  Morbid obesity.

23.  Nicotine dependence.



PLAN:  At this time, the patient will be continued on _____.  The patient's

current medications:  Benadryl 25 mg IM q. 6 hours p.r.n., Benadryl 50 mg

at bedtime p.r.n., folic acid 1 mg daily, Haldol 5 mg IM q. 6 hours p.r.n.,

Haldol 10 mg a.m. and at bedtime, Lac-Hydrin lotion to the feet, Lotrimin

cream 1% to the feet twice a day, nicotine patch 21 mg daily, multivitamin

1 tablet daily, Xopenex 0.63 mg every 6 hours p.r.n.  At present, the

patient's case is already referred to  for discharge

planning.



Dictated and electronically signed, not read.



__________________________________________

George Davis MD





DD:  02/10/2018 10:02:18

DT:  02/10/2018 10:09:12

Job # [id number]

## 2018-02-11 RX ADMIN — CLOTRIMAZOLE SCH APPLIC: 1 CREAM TOPICAL at 08:07

## 2018-02-11 RX ADMIN — AMMONIUM LACTATE SCH APPLIC: 12 CREAM TOPICAL at 08:07

## 2018-02-11 RX ADMIN — LEVALBUTEROL SCH MG: 0.63 SOLUTION RESPIRATORY (INHALATION) at 20:08

## 2018-02-11 RX ADMIN — DIPHENHYDRAMINE HYDROCHLORIDE PRN MG: 50 INJECTION INTRAMUSCULAR; INTRAVENOUS at 22:37

## 2018-02-11 RX ADMIN — THERA TABS SCH TAB: TAB at 08:07

## 2018-02-11 RX ADMIN — CLOTRIMAZOLE SCH APPLIC: 1 CREAM TOPICAL at 17:36

## 2018-02-11 NOTE — PCM.PYCHPN
Psychiatric Progress Note





- Psychiatric Progress Note


Patient seen today, length of contact: 25 min


Patient Chief Complaint: 


Patient is elevated


Problems Identified/Issues Discussed: 


I reviewed recent notes which indicate that patient remains elevated, labile 

and friendly. Staff notes indicate that her behavior did appear to calm down a 

little during the day yesterday though she still demonstrated episodes of katia 

with singing, laughing and dancing. Notes also indicate that she remains 

disorganized, sexually preoccupied and impulsive. There were no reports of 

aggression or agitation on the unit. 


Patient was seen in the hallway today. She is labile, loud, hyper and 

emotional. She laughs and cries with no clear reason within the same interview. 

Her thought process remains tangential and incoherent at times. She has a 

tendency to ramble unintelligibly so it is difficult to maintain a productive 

and informative interview with her despite attempts at redirection.


Staff notes indicate that patient can be pleasant and talkative. Compliant with 

medications. There were no other major behavioral issues over the weekend. 


 





Diagnostic Results: 


Schizoaffective disorder


PCP abuse





Medication Change: Yes (haldol was increased by 5mg)


Medical Record Reviewed: Yes





Mental Status Examination





- Cognitive Function


Orientation: Person


Memory: Impaired


Attention: Poor


Concentration: Poor


Association: Loose


Fund of Knowledge: Poor





- Mood


Mood: Depressed ("I am not depressed")





- Affect


Affect: Broad, Other (labile)





- Speech


Speech: Pressured





- Formal Thought Process


Formal Thought Process: Hallucinations (Appears to be responding to internal 

stimuli), Delusions (delusions of grandour), Loosening of associations, 

Circumstantial, Perservation





- Suicidal Ideation


Suicidal Ideation: No





- Homicidal Ideation


Homicidal Ideation: No





Goal/Treatment Plan





- Goal/Treatment Plan


Need for Continued Stay: Remain at risks for inpatient hospitalization, Severe 

depression anxiety, Discharge may exacerbated symptoms, Failed transitioning, 

Severe functional impairment


Progress Toward Problem(s) and Goals/Treatment Plan: 





* c/w current tx and plan


* c/w Haldol 10 mg AM/HS and benadryl 50 mg HS. 


Due to pregnancy will continue to be conservative with medication changes 

unless patient demonstrates potentially dangerous behavior to herself or 

others. 


* Appreciate f/u by Dr. Davis on 2/10/18~please refer to note for more details


* No new weekend labs 


* Vitals reviewed and noted below: 


 Selected Entries











  02/11/18





  07:25


 


Temperature 98.5 F


 


Pulse Rate 94 H


 


Respiratory 20





Rate 


 


Blood Pressure 98/44 L








 


Estimated Date of D/C: 02/14/18

## 2018-02-12 RX ADMIN — LEVALBUTEROL SCH MG: 0.63 SOLUTION RESPIRATORY (INHALATION) at 20:10

## 2018-02-12 RX ADMIN — DIPHENHYDRAMINE HYDROCHLORIDE PRN MG: 50 INJECTION INTRAMUSCULAR; INTRAVENOUS at 07:36

## 2018-02-12 RX ADMIN — CLOTRIMAZOLE SCH APPLIC: 1 CREAM TOPICAL at 15:43

## 2018-02-12 RX ADMIN — THERA TABS SCH TAB: TAB at 08:45

## 2018-02-12 RX ADMIN — AMMONIUM LACTATE SCH APPLIC: 12 CREAM TOPICAL at 08:45

## 2018-02-12 RX ADMIN — CLOTRIMAZOLE SCH APPLIC: 1 CREAM TOPICAL at 08:45

## 2018-02-12 RX ADMIN — LEVALBUTEROL SCH MG: 0.63 SOLUTION RESPIRATORY (INHALATION) at 07:55

## 2018-02-12 NOTE — PCM.PYCHPN
Psychiatric Progress Note





- Psychiatric Progress Note


Patient seen today, length of contact: 25 min


Patient Chief Complaint: 


" , I am EXUBRUALIENTlY brilliant, I am a  and "


Problems Identified/Issues Discussed: 


Suicide/ homicide prevention, past psychiatric h/o, current psychiatric symptoms

, medical problems, risk/benefits and alternatives of medications, medications 

compliance, coping strategies, substance abuse h/o, relapse prevention, 

importance of follow up with psychiatrist and therapist, discharge plan. 





Medical Problems: 


pt has obesity


foot fungal infection


pt is pregnant


see  note for more detailed information





Diagnostic Results: 














 18 07:00 





 18 07:00 





 Lab Results





18 08:15: Vitamin B12 575


18 07:00: Hemoglobin A1c 5.6


18 07:00: WBC 7.3, RBC 4.31, Hgb 11.6 L, Hct 35.1 L, MCV 81.4, MCH 26.9, 

MCHC 33.0, RDW 14.2, Plt Count 287, MPV 9.4, Gran % 59.2, Lymph % (Auto) 29.7, 

Mono % (Auto) 6.7 H, Eos % (Auto) 4.1, Baso % (Auto) 0.3, Gran # 4.34, Lymph # (

Auto) 2.2, Mono # (Auto) 0.5, Eos # (Auto) 0.3, Baso # (Auto) 0.02


18 07:00: RPR Nonreactive


18 07:00: TSH 3rd Generation 1.12, Beta HCG, Quant 58335.00 H


18 07:00: Sodium 137, Potassium 4.0, Chloride 102, Carbon Dioxide 25, 

Anion Gap 14, BUN 6 L, Creatinine 0.7, Est GFR (African Amer) > 60, Est GFR (Non

-Af Amer) > 60, Random Glucose 100, Fasting Glucose 100, Calcium 9.8, Magnesium 

2.0, Total Bilirubin 0.4, Direct Bilirubin 0.4, AST 29, ALT 29, Alkaline 

Phosphatase 71, Total Protein 7.6, Albumin 3.8, Globulin 3.8, Albumin/Globulin 

Ratio 1.0 L, Triglycerides 54, Cholesterol 175, LDL Cholesterol Direct 54, HDL 

Cholesterol 97 H, Folate > 20.0


18 11:12: Alcohol, Quantitative < 10


18 16:10: Blood Type Confirm O POSITIVE


18 16:10: Urine Color Yellow, Urine Appearance Sl cloudy, Urine pH 6.0, 

Ur Specific Gravity 1.025, Urine Protein Trace H, Urine Glucose (UA) Negative, 

Urine Ketones 15 H, Urine Blood Negative, Urine Nitrate Negative, Urine 

Bilirubin Negative, Urine Urobilinogen 0.2, Ur Leukocyte Esterase Negative, 

Urine RBC Negative, Urine WBC 0 - 2, Ur Epithelial Cells Many, Urine HCG, Qual 

Positive


18 16:10: Urine Opiates Screen Negative, Urine Methadone Screen Negative, 

Ur Barbiturates Screen Negative, Ur Phencyclidine Scrn Positive H, Ur 

Amphetamines Screen Negative, U Benzodiazepines Scrn Negative, U Oth Cocaine 

Metabols Negative, U Cannabinoids Screen Negative


18 15:15: Blood Type O POSITIVE, Antibody Screen Negative, BBK History 

Checked No verified bt


18 14:25: Sodium 140, Potassium 3.5 L, Chloride 106, Carbon Dioxide 25, 

Anion Gap 13, BUN 9, Creatinine 0.7, Est GFR (African Amer) > 60, Est GFR (Non-

Af Amer) > 60, Random Glucose 129 H, Calcium 9.5, Total Bilirubin 0.2, AST 26, 

ALT 24, Alkaline Phosphatase 102, Total Protein 7.2, Albumin 3.7, Globulin 3.5, 

Albumin/Globulin Ratio 1.1


18 14:25: WBC 7.6  D, RBC 4.21, Hgb 11.3 L D, Hct 33.9 L, MCV 80.5, MCH 

26.8, MCHC 33.3, RDW 14.2, Plt Count 277, MPV 9.2











Vital Signs











  Temp Pulse Resp BP Pulse Ox


 


 18 16:00   95 H   136/85 


 


 18 07:09  97.9 F  86  20  104/64 


 


 18 22:42  98.2 F  102 H  20  144/85 


 


 18 22:16    20  


 


 18 19:00  97.8 F  80  18  138/70  100


 


 18 17:07  98.9 F  80  16  147/83  100


 


 18 13:28  98.6 F  94 H  14  144/86  100


 


 18 06:00  98.5 F  79  16  137/69  100


 


 18 04:00  98.3 F  85  16  132/79  99


 


 18 02:00  98.6 F  76  17  124/68  100


 


 18 00:00  98.6 F  80  16  124/76  100


 


 18 22:00  98.3 F  85  16  137/69  99


 


 18 20:00  98.4 F  70  19  138/79  98


 


 18 18:00   91 H  18  124/74  99


 


 18 16:42   96 H  18  126/80  99


 


 18 15:10   100 H  18  125/86  99


 


 18 13:14  98.3 F  107 H  18  127/91 H  99














 Laboratory Results - last 24 hr











  18





  10:20


 


Total Creatine Kinase  150














 











Temp Pulse Resp BP Pulse Ox


 


 97.9 F   97 H  20   122/59 L  100 


 


 18 07:28  18 07:28  18 07:28  18 07:28  18 19:00








DSM 5 Symptoms Update: 





Shortly pt is 36yo AAF with long h/o mental illness, h/o multiple admissions in 

the psychiatric inpatient unit (mostly in [facility], recent admission to [facility] in 

2017), pt also has h/o substance use disorder, pt was admitted for 

evaluation and stabilization of disorganized, psychotic behavior, initially pt 

came for evaluation of abdominal pain, in ED pt presented to be disorganized, 

psychotic, this writer suggested [facility] screening, during awaiting for [facility] 

screening pt required to be medicated with Haldol 2mg PO, then pt was evaluated 

and pt was found to be not committable, pt was offered voluntary admission to 

[facility], pt agreed to sign in, then over night pt decided to leave the hospital and 

signed 48hr notice. 





pt was seen and examined next at the nursing station, pt seems to be little 

better, thought process is little more organized but still pt is in manic stage 

said that "I am EXUBRUALIENTlY brilliant, I am a  and , see 

I even created a new word how to you like it? EXUBRUALIENTlY brilliant, this is 

the way I feel". 





pt wanted to have an  at the time of admission, but at present moment 

pt wants to keep the pregnancy, from this writer perspective pt needs to be 

more stabilized from the mental stand point and make right decision for 

herself. 





18:family meeting with pt's mother Melva Sutton [phone] 


as per Mother, she is pt's POA


pt's mother was advised to bring official document to confirm this statement


over the weekend mother did not bring any documents. 


Pt's mother said that when pt was better (prior to this admission and prior pt'

s relapse on drugs) she wanted to terminate her pregnancy and even have a tubal 

ligation. 





as per staff pt is compliant with meds but last night pt laid in front of the 

nursing station, was exposing herself, needed to be medicated. 





OB and Gyn team evaluated pt, consult, input appreciated. see notes.


Podiatry consult appreciated. 


medical consult appreciated.





pt tolerates meds well, no side effects observed or reported, AIMS 0, no EPS.





pt was educated about risk/benefits and alternatives of meds. 





Impression:


Schizoaffective disorder


PCP abuse


Medication Change: No


Medical Record Reviewed: Yes


Consults ordered or reviewed: 





medical and podiatry consults appreciated


OB consult appreciated











Mental Status Examination





- Cognitive Function


Orientation: Person


Memory: Impaired


Attention: Poor (somewhat better)


Concentration: Poor (some improvement)


Association: Loose


Fund of Knowledge: Poor





- Mood


Mood: Depressed ("I am not depressed, I am very proud of myself")





- Affect


Affect: Broad, Other (labile)





- Speech


Speech: Pressured





- Formal Thought Process


Formal Thought Process: Hallucinations (Appears to be responding to internal 

stimuli), Delusions (delusions of grandour), Loosening of associations, 

Circumstantial, Perservation





- Suicidal Ideation


Suicidal Ideation: No





- Homicidal Ideation


Homicidal Ideation: No





Goal/Treatment Plan





- Goal/Treatment Plan


Need for Continued Stay: Remain at risks for inpatient hospitalization, Severe 

depression anxiety, Discharge may exacerbated symptoms, Failed transitioning, 

Severe functional impairment


Progress Toward Problem(s) and Goals/Treatment Plan: 





Milieu/structure/supportive therapy


meds confirmed


OB&Gyn called, input appreciated 


Podiatry consult called, consult appreciated


Haldol 5mg po tid was increased to 10mg amhs for psychosis (pt was d/c from 

[facility] on haldol 5mg po tid)


PRN meds


benadryl as needed for insomnia


MVI (prenatal)


Folic acid


SW evaluation


pt submitted 48hr noticeX 2, but rescinded


Pt was screened by [facility] in ED, was not found to be committable


Family involvement


will monitor closely


family meeting appreciated, awaiting for the legal documents indicating pt's 

mother is POA


Pt was educated about risk/benefits and alternatives of medications, coping 

strategies (safety plan, suicide prevention), relapse prevention, importance of 

follow up with psychiatrist and therapist, stay away from drugs/alcohol/smoking


Estimated Date of D/C: 18

## 2018-02-12 NOTE — PN
DATE:  02/11/2018



SUBJECTIVE:  The patient was seen in room 517, bed 2.  The patient appears

to be much relaxed and calm.  No repetitive language was noted and no

repetitive speech was noted.  The patient's overnight nurse's notes were

reviewed.



PHYSICAL EXAMINATION:

VITAL SIGNS:  T-max 98.5; heart rate 94; blood pressure 121/76, 198/44;

respirations 20.

GENERAL:  The patient is ambulating in the room.

HEENT:  Head examination is normocephalic, atraumatic.  HEENT examination

shows pink conjunctivae.  Anicteric sclerae.  No oropharyngeal lesion.  No

neck rigidity.

CHEST:  Symmetrical.

LUNGS:  Shows occasional rhonchi, upper lung fields anteriorly, which

clears up with coughing and deep breathing.

CARDIOVASCULAR:  S1, S2.  Regular rhythm.  No audible murmur, gallop or

rub.

ABDOMEN:  Morbidly obese.  No hepatosplenomegaly palpable.  No

costovertebral angle tenderness.  No guarding.  No rigidity.  No rebound

tenderness.

GENITALIA:  Female.

RECTAL:  Deferred.

EXTREMITY:  Shows chronic nonpitting swelling of the lower extremity.  No

calf tenderness, no Homans' sign.

MUSCULOSKELETAL:  Shows a body mass index of 54.

NEUROLOGIC:  The patient is alert, awake, oriented.  Able to ambulate

independently.



CURRENT CONSULTATION:  Podiatry and OB/GYN.



IMPRESSION AND PLAN:

1.  Acute exacerbation of schizoaffective disorder or schizophrenia,

bipolar type.

2.  Morbid obesity.

3.  Nicotine, alcohol and phencyclidine abuse and dependence.

4.  Pregnant state.

5.  Morbid obesity with elevated body mass index of 54.

6.  History of hypertension.

7.  Normocytic anemia.

8.  Hypokalemia.

9.  Trace proteinuria.

10.  Bipolar schizoaffective disorder.

11.  Disorganized repetitive speech.

12.  Prediabetes with hemoglobin A1c of 5.6.

13.  Tachycardia.



PLAN:  At this time, the patient's current medications;

1.  Benadryl 25 mg IM q. 6 hours p.r.n., Benadryl 50 mg at bedtime p.r.n.

2.  Folic acid 1 mg daily.

3.  Haldol 5 mg IM q. 6 hours p.r.n.

4.  Haldol 10 mg, morning and at bedtime.

5.  Lac-Hydrin lotion to the feet.

6.  Lotrimin 1% cream to the feet b.i.d.

7.  Nicotine patch 21 mg daily.

8.  Multivitamin 1 tablet daily.

9.  Xopenex changed to 0.63 mg nebulizer every 12 hours.



At present, medically the patient is stable for discharge from psychiatry

whenever the Psychiatry clears the patient.  The patient needs close

Psychiatry followup and obstetrician followup regarding pregnancy status.



Dictated and electronically signed, not read.



__________________________________________

George Davis MD





DD:  02/11/2018 18:06:45

DT:  02/11/2018 18:11:42

Job # [id number]

## 2018-02-12 NOTE — PN
DATE:  02/09/2018



SUBJECTIVE:  The patient is seen in the dayroom on Psychiatry floor.  The

patient is awake, responsive.  The patient's speech is much less repetitive

today and less pressured.  The patient does not offer any complaint.  The

patient denies any headache, denies any fever, denies any chills.  Denies

any nausea, denies any vomiting, denies any chest pain, denies any

hemoptysis, hematemesis, melena, denies any dysuria, denies any frequency,

denies any bleeding.  The patient is seen sitting up in the dayroom. 

Having breakfast.  Overnight nurse's notes were reviewed.  The patient was

found to be alert, awake, responsive.  As mentioned, the patient urinated

in the nursing station yesterday.  The patient had her fetal heart rate

checked.  The patient was seen by .  The patient was seen and

evaluated by the gynecologist.



PHYSICAL EXAMINATION:

VITAL SIGNS:  T-max 98; heart rate 99, 101; blood pressure 136/82, 142/91,

121/76; respiration 20; O2 sat is 100%.

HEENT:  Head examination normocephalic, atraumatic.  HEENT examination

shows pinkish conjunctivae.  Anicteric sclerae.  No oropharyngeal lesion. 

No neck rigidity.

CHEST:  Kyphosis.

LUNGS:  Shows no rales, crackles or wheezing.

CARDIOVASCULAR:  S1, S2.  Regular rhythm.  No audible murmur, gallop or

rub.

ABDOMEN:  Morbidly obese.  Positive bowel sounds.  No hepatosplenomegaly

appreciated.

GENITALIA:  Female.

RECTAL:  Deferred.

EXTREMITY:  Shows no pitting edema, no calf tenderness, no Cuco's signs. 

Chronic nonpitting swelling of the legs noted.

MUSCULOSKELETAL:  Shows a body mass index of 56.  Cranial nerves II through

XII limited.  Gait examination is independent.



The patient was seen by obstetrician and gynecologist.  Recommendation that

the patient has uncomplicated 14-week of gestation without any obstetrical

issue.  The patient possibly wanting termination of pregnancy.  OB/GYN

recommended if premonition of pregnancy is wanted to schedule outpatient

appointment and if prenatal care wanted, scheduled outpatient appointment. 

No indications for fetal monitoring was indicated by the OB/GYN.



The patient was seen by psychiatrist.



IMPRESSION AND PLAN:

1.  Schizoaffective disorder.

2.  Schizoaffective disorder with pressured and repetitive speech.

3.  Nicotine, alcohol and phencyclidine abuse.

4.  Morbid obesity.

5.  Hypertension.

6.  Tachycardia.

7.  Normocytic anemia.

8.  Hypokalemia.

9.  Probable prediabetes

10.  Trace proteinuria.

11.  Urine drug screen positive for phencyclidine.



PLAN:  At this time, the patient's current medications;

1.  Benadryl 25 mg IM q. 6 hours p.r.n.

2.  Benadryl 50 mg at bedtime p.r.n.

3.  Folic acid 1 mg daily.

4.  Haldol 5 mg IM q. 6 hours p.r.n.

5.  Haldol 10 mg in a.m. and at bedtime.

6.  Lac-Hydrin lotion to the feet daily.

7.  Lotrimin 1% cream to the feet twice a day.

8.  Nicotine patch 21 mg daily.

9.  Multivitamin Thera daily.

10.  Xopenex nebulizer 0.63 mg every 6 hours p.r.n.



At present.  The patient is medically stable.  The patient has already been

seen by Podiatry and OB/GYN.  Their recommendations were all noted.  The

patient will be followed peripherally and if any acute intervention needed,

interventions will be done.  For some reason, the patient had a CPK and

vitamin B12 done, which were normal today.



Dictated and electronically signed, not read.



__________________________________________

George Davis MD





DD:  02/09/2018 21:34:19

DT:  02/09/2018 21:37:24

Job # [id number]

## 2018-02-13 RX ADMIN — CLOTRIMAZOLE SCH APPLIC: 1 CREAM TOPICAL at 08:53

## 2018-02-13 RX ADMIN — LEVALBUTEROL SCH MG: 0.63 SOLUTION RESPIRATORY (INHALATION) at 08:18

## 2018-02-13 RX ADMIN — AMMONIUM LACTATE SCH APPLIC: 12 CREAM TOPICAL at 08:54

## 2018-02-13 RX ADMIN — THERA TABS SCH TAB: TAB at 08:53

## 2018-02-13 RX ADMIN — CLOTRIMAZOLE SCH APPLIC: 1 CREAM TOPICAL at 16:26

## 2018-02-13 NOTE — PN
DATE:  02/12/2018



LOCATION:  The patient was seen in room 571, bed 2.



SUBJECTIVE:  The patient is out of bed to chair, ambulating.  Today, the

patient's speech appears to be pressured again.  The patient again was

noticed to have a repetitive speech pattern.  The patient was found to be

alert, awake, responsive.  Overnight nurse's notes were reviewed.



OBJECTIVE:

VITAL SIGNS:  T-max 97.9, heart rate 95, 97, blood pressure 122/59,

respirations 20, O2 sat is 100%.

HEENT:  Head examination; normocephalic, atraumatic.  HEENT examination

shows pink conjunctivae, anicteric sclerae.  No oropharyngeal lesion.  No

neck rigidity.

CHEST:  Kyphosis.

LUNGS:  Shows no rales, crackles or wheezing.

CARDIOVASCULAR:  S1, S2, regular rhythm.

ABDOMEN:  Obese.  Positive bowel sounds.  No hepatosplenomegaly noted.  No

guarding, no rigidity, no rebound tenderness.

GENITALIA:  Female.

RECTAL:  Examination is deferred.

EXTREMITIES:  Show chronic nonpitting swelling of the lower extremity

noted.  No calf tenderness, no Homans' sign.

NEUROLOGIC:  The patient is alert, awake, responsive, is able to move upper

and lower extremity without assistance.  Gait is independent.  Vascular

examination could not be appreciated.  Cranial nerves II-XII limited.

MUSCULOSKELETAL:  Examination shows a body mass index of 54.



DIAGNOSTICS:  None.



CONSULTATIONS:  The patient was seen by Psychiatry.



IMPRESSION AND PLAN

1.  Acute exacerbation of schizoaffective disorder with pressured and

repetitive speech pattern.

2.  Nicotine, alcohol and PCP abuse and dependence.

3.  Transient tachycardia.

4.  Hypertension.

5.  Normocytic anemia.

6.  Hypokalemia.

7.  14 weeks' gestational and pregnant state.

8.  Morbid obesity with elevated body mass index of 54.

9.  Proteinuria.

10.  Ketonuria.

11.  Urine drug screen positive for PCP.

12.  O+ blood type.

13.  History of nicotine, alcohol and polysubstance abuse, and PCP abuse

and dependence.



PLAN:  The patient is presently on Benadryl 50 mg at bedtime p.r.n., folic

acid 1 mg daily, Haldol 5 mg IM q.6h. p.r.n., Haldol 10 mg in the morning

and at bedtime, Lac-Hydrin lotion to both feet, Lotrimin 1% cream twice a

day to feet, nicotine patch 21 mg daily, multivitamin 1 tablet daily,

Xopenex 0.63 mg nebulizer every 12 hours round the clock.  The patient is

seen by  for discharge planning.  The patient's case was

referred to  for discharge planning.



Dictated and electronically signed, not read.





__________________________________________

George Davis MD





DD:  02/12/2018 23:39:01

DT:  02/12/2018 23:43:18

Job # [id number]

## 2018-02-13 NOTE — PCM.PYCHPN
Psychiatric Progress Note





- Psychiatric Progress Note


Patient seen today, length of contact: 30min


Patient Chief Complaint: 


" , I am better, I want to have a big mac, I want to go to [place]...

"
Problems Identified/Issues Discussed: 


Suicide/ homicide prevention, past psychiatric h/o, current psychiatric symptoms

, medical problems, risk/benefits and alternatives of medications, medications 

compliance, coping strategies, substance abuse h/o, relapse prevention, 

importance of follow up with psychiatrist and therapist, discharge plan. 


pt wants to point her mother to be her POA, pt was educated who POA is, pt said 

"I want my mother to help me with medical decisions, but I will take care of my 

finances".





Medical Problems: 


pt has obesity


foot fungal infection


pt is pregnant


see  note for more detailed information





Diagnostic Results: 














 18 07:00 





 18 07:00 





 Lab Results





18 08:15: Vitamin B12 575


18 07:00: Hemoglobin A1c 5.6


18 07:00: WBC 7.3, RBC 4.31, Hgb 11.6 L, Hct 35.1 L, MCV 81.4, MCH 26.9, 

MCHC 33.0, RDW 14.2, Plt Count 287, MPV 9.4, Gran % 59.2, Lymph % (Auto) 29.7, 

Mono % (Auto) 6.7 H, Eos % (Auto) 4.1, Baso % (Auto) 0.3, Gran # 4.34, Lymph # (

Auto) 2.2, Mono # (Auto) 0.5, Eos # (Auto) 0.3, Baso # (Auto) 0.02


18 07:00: RPR Nonreactive


18 07:00: TSH 3rd Generation 1.12, Beta HCG, Quant 88619.00 H


18 07:00: Sodium 137, Potassium 4.0, Chloride 102, Carbon Dioxide 25, 

Anion Gap 14, BUN 6 L, Creatinine 0.7, Est GFR (African Amer) > 60, Est GFR (Non

-Af Amer) > 60, Random Glucose 100, Fasting Glucose 100, Calcium 9.8, Magnesium 

2.0, Total Bilirubin 0.4, Direct Bilirubin 0.4, AST 29, ALT 29, Alkaline 

Phosphatase 71, Total Protein 7.6, Albumin 3.8, Globulin 3.8, Albumin/Globulin 

Ratio 1.0 L, Triglycerides 54, Cholesterol 175, LDL Cholesterol Direct 54, HDL 

Cholesterol 97 H, Folate > 20.0


18 11:12: Alcohol, Quantitative < 10


18 16:10: Blood Type Confirm O POSITIVE


18 16:10: Urine Color Yellow, Urine Appearance Sl cloudy, Urine pH 6.0, 

Ur Specific Gravity 1.025, Urine Protein Trace H, Urine Glucose (UA) Negative, 

Urine Ketones 15 H, Urine Blood Negative, Urine Nitrate Negative, Urine 

Bilirubin Negative, Urine Urobilinogen 0.2, Ur Leukocyte Esterase Negative, 

Urine RBC Negative, Urine WBC 0 - 2, Ur Epithelial Cells Many, Urine HCG, Qual 

Positive


18 16:10: Urine Opiates Screen Negative, Urine Methadone Screen Negative, 

Ur Barbiturates Screen Negative, Ur Phencyclidine Scrn Positive H, Ur 

Amphetamines Screen Negative, U Benzodiazepines Scrn Negative, U Oth Cocaine 

Metabols Negative, U Cannabinoids Screen Negative


18 15:15: Blood Type O POSITIVE, Antibody Screen Negative, BBK History 

Checked No verified bt


18 14:25: Sodium 140, Potassium 3.5 L, Chloride 106, Carbon Dioxide 25, 

Anion Gap 13, BUN 9, Creatinine 0.7, Est GFR (African Amer) > 60, Est GFR (Non-

Af Amer) > 60, Random Glucose 129 H, Calcium 9.5, Total Bilirubin 0.2, AST 26, 

ALT 24, Alkaline Phosphatase 102, Total Protein 7.2, Albumin 3.7, Globulin 3.5, 

Albumin/Globulin Ratio 1.1


18 14:25: WBC 7.6  D, RBC 4.21, Hgb 11.3 L D, Hct 33.9 L, MCV 80.5, MCH 

26.8, MCHC 33.3, RDW 14.2, Plt Count 277, MPV 9.2











Vital Signs











  Temp Pulse Resp BP Pulse Ox


 


 18 16:00   95 H   136/85 


 


 18 07:09  97.9 F  86  20  104/64 


 


 18 22:42  98.2 F  102 H  20  144/85 


 


 18 22:16    20  


 


 18 19:00  97.8 F  80  18  138/70  100


 


 18 17:07  98.9 F  80  16  147/83  100


 


 18 13:28  98.6 F  94 H  14  144/86  100


 


 18 06:00  98.5 F  79  16  137/69  100


 


 18 04:00  98.3 F  85  16  132/79  99


 


 18 02:00  98.6 F  76  17  124/68  100


 


 18 00:00  98.6 F  80  16  124/76  100


 


 18 22:00  98.3 F  85  16  137/69  99


 


 18 20:00  98.4 F  70  19  138/79  98


 


 18 18:00   91 H  18  124/74  99


 


 18 16:42   96 H  18  126/80  99


 


 18 15:10   100 H  18  125/86  99


 


 18 13:14  98.3 F  107 H  18  127/91 H  99














 Laboratory Results - last 24 hr











  18





  10:20


 


Total Creatine Kinase  150














 











Temp Pulse Resp BP Pulse Ox


 


 97.9 F   97 H  20   122/59 L  100 


 


 18 07:28  18 07:28  18 07:28  18 07:28  18 19:00











 











Temp Pulse Resp BP Pulse Ox


 


 98.2 F   100 H  16   119/77   100 


 


 18 07:09  18 07:09  18 07:09  18 07:09  18 19:00








DSM 5 Symptoms Update: 





Shortly pt is 36yo AAF with long h/o mental illness, h/o multiple admissions in 

the psychiatric inpatient unit (mostly in [facility], recent admission to [facility] in 

2017), pt also has h/o substance use disorder, pt was admitted for 

evaluation and stabilization of disorganized, psychotic behavior, initially pt 

came for evaluation of abdominal pain, in ED pt presented to be disorganized, 

psychotic, this writer suggested [facility] screening, during awaiting for [facility] 

screening pt required to be medicated with Haldol 2mg PO, then pt was evaluated 

and pt was found to be not committable, pt was offered voluntary admission to 

[facility], pt agreed to sign in, then over night pt decided to leave the hospital and 

signed 48hr notice, then rescinded it, on the other day pt signed another 48hr 

notice, then rescinded it again. Currently pt is under voluntary status. 





pt was seen and examined today at the treatment team meeting room. 


pt presented somewhat better, pt said that she wants to have a "big Mac from 

[employer]", pt said that she feels "very good", pt said that she does not feel 

depressed, denied thoughts of harming self or others. as per staff pt is 

pleasant, at times childlike demeanor, pt does not have agitation or aggression.





pt wanted to have an  at the time of admission, but at present moment 

pt wants to keep the pregnancy, from this writer perspective pt needs to be 

more stabilized from the mental stand point and make right decision for 

herself. yesterday pt asked her mother to be her POA, today this writer checked 

if pt signed any official documents, pt checked that she does not want anyone 

to be her POA, this writer asked pt about this decision, pt said that she 

wanted her mother to be a POA "for medical decisions only but not for finances, 

she is my mother, I trust her". This writer contacted , she 

confirmed pt pointed her mother to be her POA, but it was technical mistake. Pt 

pointed her mother to be her POA for medical issues today, official document 

filed in chart. pt has capacity to sign POA documents. 





this writer asked about substance abuse prior to come to the hospital because 

PCP was positive, pt said that she did not know that she was pregnant when she 

used it, but as per record pt was discharged from [facility] in January, pt signed 

consent for medical record request, will f/u on it. 





18:family meeting with pt's mother Melva Sutton [phone] 


as per Mother, she is pt's POA


pt's mother was advised to bring official document to confirm this statement


over the weekend mother did not bring any documents. 


Pt's mother said that when pt was better (prior to this admission and prior pt'

s relapse on drugs) she wanted to terminate her pregnancy and even have a tubal 

ligation. 





as per staff pt is doing better, but labile, usually pt is pleasant, but at 

times could be demanding.





OB and Gyn team evaluated pt, consult, input appreciated. see notes.


Podiatry consult appreciated. 


medical consult appreciated.





pt was educated about risk/benefits and alternatives of meds.  pt tolerates 

meds well, no side effects observed or reported, AIMS 0, no EPS.





Impression:


Schizoaffective disorder


PCP abuse


Medication Change: No


Medical Record Reviewed: Yes


Consults ordered or reviewed: 





medical and podiatry consults appreciated


OB consult appreciated











Mental Status Examination





- Cognitive Function


Orientation: Person


Memory: Impaired


Attention: Poor (somewhat better)


Concentration: Poor (some improvement)


Association: Loose


Fund of Knowledge: Poor





- Mood


Mood: Depressed ("I am not depressed, I am very proud of myself")





- Affect


Affect: Broad, Other (labile)





- Speech


Speech: Appropriate (overproductive)





- Formal Thought Process


Formal Thought Process: Hallucinations (Appears to be responding to internal 

stimuli), Delusions (delusions of grandour), Loosening of associations, 

Circumstantial, Perservation





- Suicidal Ideation


Suicidal Ideation: No





- Homicidal Ideation


Homicidal Ideation: No





Goal/Treatment Plan





- Goal/Treatment Plan


Need for Continued Stay: Remain at risks for inpatient hospitalization, Severe 

depression anxiety, Discharge may exacerbated symptoms, Failed transitioning, 

Severe functional impairment


Progress Toward Problem(s) and Goals/Treatment Plan: 





Milieu/structure/supportive therapy


meds confirmed


OB&Gyn called, input appreciated 


Podiatry consult called, consult appreciated


Haldol 5mg po tid was increased to 10mg amhs for psychosis (pt was d/c from 

[facility] on haldol 5mg po tid)


PRN meds


benadryl as needed for insomnia


MVI (prenatal)


Folic acid


SW evaluation


pt submitted 48hr noticeX 2, but rescinded


Pt was screened by [facility] in ED, was not found to be committable


Family involvement


will monitor closely


family meeting appreciated, awaiting for the legal documents indicating pt's 

mother is POA


Pt was educated about risk/benefits and alternatives of medications, coping 

strategies (safety plan, suicide prevention), relapse prevention, importance of 

follow up with psychiatrist and therapist, stay away from drugs/alcohol/smoking





medical record from [facility] requested


family meeting requested to discuss d/c plan





Estimated Date of D/C: 18

## 2018-02-14 RX ADMIN — CLOTRIMAZOLE SCH APPLIC: 1 CREAM TOPICAL at 09:12

## 2018-02-14 RX ADMIN — LEVALBUTEROL SCH MG: 0.63 SOLUTION RESPIRATORY (INHALATION) at 11:12

## 2018-02-14 RX ADMIN — CLOTRIMAZOLE SCH APPLIC: 1 CREAM TOPICAL at 16:09

## 2018-02-14 RX ADMIN — LEVALBUTEROL SCH MG: 0.63 SOLUTION RESPIRATORY (INHALATION) at 20:44

## 2018-02-14 RX ADMIN — AMMONIUM LACTATE SCH APPLIC: 12 CREAM TOPICAL at 09:12

## 2018-02-14 RX ADMIN — THERA TABS SCH TAB: TAB at 09:11

## 2018-02-14 NOTE — PCM.PYCHPN
Psychiatric Progress Note





- Psychiatric Progress Note


Patient seen today, length of contact: 30min


Patient Chief Complaint: 


" , I am better"


Problems Identified/Issues Discussed: 


Suicide/ homicide prevention, past psychiatric h/o, current psychiatric symptoms

, medical problems, risk/benefits and alternatives of medications, medications 

compliance, coping strategies, substance abuse h/o, relapse prevention, 

importance of follow up with psychiatrist and therapist, discharge plan. 


pt wants to point her mother to be her POA, pt was educated who POA is, pt said 

"I want my mother to help me with medical decisions, but I will take care of my 

finances".





Medical Problems: 


pt has obesity


foot fungal infection


pt is pregnant


see  note for more detailed information








Diagnostic Results: 














 18 07:00 





 18 07:00 





 Lab Results





18 08:15: Vitamin B12 575


18 07:00: Hemoglobin A1c 5.6


18 07:00: WBC 7.3, RBC 4.31, Hgb 11.6 L, Hct 35.1 L, MCV 81.4, MCH 26.9, 

MCHC 33.0, RDW 14.2, Plt Count 287, MPV 9.4, Gran % 59.2, Lymph % (Auto) 29.7, 

Mono % (Auto) 6.7 H, Eos % (Auto) 4.1, Baso % (Auto) 0.3, Gran # 4.34, Lymph # (

Auto) 2.2, Mono # (Auto) 0.5, Eos # (Auto) 0.3, Baso # (Auto) 0.02


18 07:00: RPR Nonreactive


18 07:00: TSH 3rd Generation 1.12, Beta HCG, Quant 49160.00 H


18 07:00: Sodium 137, Potassium 4.0, Chloride 102, Carbon Dioxide 25, 

Anion Gap 14, BUN 6 L, Creatinine 0.7, Est GFR (African Amer) > 60, Est GFR (Non

-Af Amer) > 60, Random Glucose 100, Fasting Glucose 100, Calcium 9.8, Magnesium 

2.0, Total Bilirubin 0.4, Direct Bilirubin 0.4, AST 29, ALT 29, Alkaline 

Phosphatase 71, Total Protein 7.6, Albumin 3.8, Globulin 3.8, Albumin/Globulin 

Ratio 1.0 L, Triglycerides 54, Cholesterol 175, LDL Cholesterol Direct 54, HDL 

Cholesterol 97 H, Folate > 20.0


18 11:12: Alcohol, Quantitative < 10


18 16:10: Blood Type Confirm O POSITIVE


18 16:10: Urine Color Yellow, Urine Appearance Sl cloudy, Urine pH 6.0, 

Ur Specific Gravity 1.025, Urine Protein Trace H, Urine Glucose (UA) Negative, 

Urine Ketones 15 H, Urine Blood Negative, Urine Nitrate Negative, Urine 

Bilirubin Negative, Urine Urobilinogen 0.2, Ur Leukocyte Esterase Negative, 

Urine RBC Negative, Urine WBC 0 - 2, Ur Epithelial Cells Many, Urine HCG, Qual 

Positive


18 16:10: Urine Opiates Screen Negative, Urine Methadone Screen Negative, 

Ur Barbiturates Screen Negative, Ur Phencyclidine Scrn Positive H, Ur 

Amphetamines Screen Negative, U Benzodiazepines Scrn Negative, U Oth Cocaine 

Metabols Negative, U Cannabinoids Screen Negative


18 15:15: Blood Type O POSITIVE, Antibody Screen Negative, BBK History 

Checked No verified bt


18 14:25: Sodium 140, Potassium 3.5 L, Chloride 106, Carbon Dioxide 25, 

Anion Gap 13, BUN 9, Creatinine 0.7, Est GFR (African Amer) > 60, Est GFR (Non-

Af Amer) > 60, Random Glucose 129 H, Calcium 9.5, Total Bilirubin 0.2, AST 26, 

ALT 24, Alkaline Phosphatase 102, Total Protein 7.2, Albumin 3.7, Globulin 3.5, 

Albumin/Globulin Ratio 1.1


18 14:25: WBC 7.6  D, RBC 4.21, Hgb 11.3 L D, Hct 33.9 L, MCV 80.5, MCH 

26.8, MCHC 33.3, RDW 14.2, Plt Count 277, MPV 9.2











Vital Signs











  Temp Pulse Resp BP Pulse Ox


 


 18 16:00   95 H   136/85 


 


 18 07:09  97.9 F  86  20  104/64 


 


 18 22:42  98.2 F  102 H  20  144/85 


 


 18 22:16    20  


 


 18 19:00  97.8 F  80  18  138/70  100


 


 18 17:07  98.9 F  80  16  147/83  100


 


 18 13:28  98.6 F  94 H  14  144/86  100


 


 18 06:00  98.5 F  79  16  137/69  100


 


 18 04:00  98.3 F  85  16  132/79  99


 


 18 02:00  98.6 F  76  17  124/68  100


 


 18 00:00  98.6 F  80  16  124/76  100


 


 18 22:00  98.3 F  85  16  137/69  99


 


 18 20:00  98.4 F  70  19  138/79  98


 


 18 18:00   91 H  18  124/74  99


 


 18 16:42   96 H  18  126/80  99


 


 18 15:10   100 H  18  125/86  99


 


 18 13:14  98.3 F  107 H  18  127/91 H  99














 Laboratory Results - last 24 hr











  18





  10:20


 


Total Creatine Kinase  150














 











Temp Pulse Resp BP Pulse Ox


 


 97.9 F   97 H  20   122/59 L  100 


 


 18 07:28  18 07:28  18 07:28  18 07:28  18 19:00











 











Temp Pulse Resp BP Pulse Ox


 


 98.2 F   100 H  16   119/77   100 


 


 18 07:09  18 07:09  18 07:09  18 07:09  18 19:00











 Vital Signs - 24 hr











  18





  07:24


 


Temperature 98.6 F


 


Pulse Rate 97 H


 


Respiratory 20





Rate 


 


Blood Pressure 140/81











DSM 5 Symptoms Update: 





Shortly pt is 36yo AAF with long h/o mental illness, h/o multiple admissions in 

the psychiatric inpatient unit (mostly in [facility] [place] [facility], recent admission to [facility] in 

2017), pt also has h/o substance use disorder, pt was admitted for 

evaluation and stabilization of disorganized, psychotic behavior, initially pt 

came for evaluation of abdominal pain, in ED pt presented to be disorganized, 

psychotic, this writer suggested [facility] screening, during awaiting for [facility] 

screening pt required to be medicated with Haldol 2mg PO, then pt was evaluated 

and pt was found to be not committable, pt was offered voluntary admission to 

[facility], pt agreed to sign in, then over night pt decided to leave the hospital and 

signed 48hr notice, then rescinded it, on the other day pt signed another 48hr 

notice, then rescinded it again. Currently pt is under voluntary status. 





pt was seen and examined today at the treatment team meeting room, seems in 

good mood, pt said that she feels "very good", denied being depressed, at the 

same time pt had labile affect, was crying then was laughing. 


pt was advised that DYFS was called in ED, pt seems to be frightened, then said 

"If DYFS involved I will have an ", this writer advised to have a 

family meeting with pt's POA mother, family meeting will take place tomorrow. 





this writer asked about substance abuse prior to come to the hospital because 

PCP was positive, pt said that she did not know that she was pregnant when she 

used it, but as per record pt was discharged from [facility] [place] [facility] in January, pt signed 

consent for medical record request, it was confirmed pt was pregnant back then, 

pt was discharged to IOP program. PCP was positive while she came to [facility] 1/10/

18. pt was d/c on haldol 5mg po tid. 





18:family meeting with pt's mother Melva Sutton [phone] 


as per Mother, she is pt's POA


pt's mother was advised to bring official document to confirm this statement


over the weekend mother did not bring any documents. 


Pt's mother said that when pt was better (prior to this admission and prior pt'

s relapse on drugs) she wanted to terminate her pregnancy and even have a tubal 

ligation. 





as per staff pt is doing better, but labile, usually pt is pleasant, but at 

times could be demanding.





OB and Gyn team evaluated pt, consult, input appreciated. see notes.


Podiatry consult appreciated. 


medical consult appreciated.





pt was educated about risk/benefits and alternatives of meds.  pt tolerates 

meds well, no side effects observed or reported, AIMS 0, no EPS.





Impression:


Schizoaffective disorder


PCP abuse


Medication Change: No


Medical Record Reviewed: Yes





Mental Status Examination





- Cognitive Function


Orientation: Person


Memory: Impaired


Attention: Poor (somewhat better)


Concentration: Poor (some improvement)


Association: Loose


Fund of Knowledge: Poor





- Mood


Mood: Depressed ("I am not depressed, I am very proud of myself")





- Affect


Affect: Broad, Other (labile)





- Speech


Speech: Appropriate (overproductive)





- Formal Thought Process


Formal Thought Process: Hallucinations (Appears to be responding to internal 

stimuli), Delusions (delusions of grandour), Loosening of associations, 

Circumstantial, Perservation





- Suicidal Ideation


Suicidal Ideation: No





- Homicidal Ideation


Homicidal Ideation: No





Goal/Treatment Plan





- Goal/Treatment Plan


Need for Continued Stay: Remain at risks for inpatient hospitalization, Severe 

depression anxiety, Discharge may exacerbated symptoms, Failed transitioning, 

Severe functional impairment


Progress Toward Problem(s) and Goals/Treatment Plan: 





Milieu/structure/supportive therapy


meds confirmed


OB&Gyn called, input appreciated 


Podiatry consult called, consult appreciated


Haldol 10mg amhs for psychosis (pt was d/c from [facility] [place] on haldol 5mg po tid)


PRN meds


benadryl as needed for insomnia


MVI (prenatal)


Folic acid


SW evaluation


pt submitted 48hr noticeX 2, but rescinded


Pt was screened by [facility] in ED, was not found to be committable


Family involvement


will monitor closely


family meeting appreciated, awaiting for the legal documents indicating pt's 

mother is POA


Pt was educated about risk/benefits and alternatives of medications, coping 

strategies (safety plan, suicide prevention), relapse prevention, importance of 

follow up with psychiatrist and therapist, stay away from drugs/alcohol/smoking





medical record from [facility] [place] [facility] requested, appreciated, filed in chart


family meeting requested to discuss d/c plan





family meeting tomorrow








Estimated Date of D/C: 18

## 2018-02-15 RX ADMIN — CLOTRIMAZOLE SCH APPLIC: 1 CREAM TOPICAL at 17:19

## 2018-02-15 RX ADMIN — THERA TABS SCH TAB: TAB at 09:22

## 2018-02-15 RX ADMIN — LEVALBUTEROL SCH MG: 0.63 SOLUTION RESPIRATORY (INHALATION) at 08:33

## 2018-02-15 RX ADMIN — AMMONIUM LACTATE SCH APPLIC: 12 CREAM TOPICAL at 09:23

## 2018-02-15 RX ADMIN — LEVALBUTEROL SCH MG: 0.63 SOLUTION RESPIRATORY (INHALATION) at 20:52

## 2018-02-15 RX ADMIN — CLOTRIMAZOLE SCH APPLIC: 1 CREAM TOPICAL at 09:23

## 2018-02-15 NOTE — PCM.BM
<Kindra Mohr Y - Last Filed: 02/15/18 09:50>





Treatment Plan Problems





- Problems identified on initial assessmt


  ** BIPOLAR  DISORDER


Date Initiated: 02/06/18


Time Initiated: 22:00


Assessment reference: NA


Status: Active





  ** SUBSTANCE ABUSE


Date Initiated: 02/06/18


Assessment reference: NA


Status: Active





  ** ANXIETY


Date Initiated: 02/06/18


Assessment reference: NA


Status: Active





  ** Altered Thought Process


Date Initiated: 02/07/18


Time Initiated: 10:07


Assessment reference: NA


Status: Active


Priority: 1





  ** Delusions


Date Initiated: 02/07/18


Time Initiated: 10:07


Assessment reference: NA


Status: Active


Priority: 2





  ** Agitated Behavior


Date Initiated: 02/07/18


Time Initiated: 10:08


Assessment reference: NA


Status: Active


Priority: 3





Treatment assets and liabiliti


Patient Assests: adapts well, cooperative, educated, self-reliant, ADL 

independent, physically healthy, negotiates basic needs, cognitively intact, 

good interpersonal skills


Patient Liabilities: live alone, financial problems, dietary restrictions, 

substance abuse, medical problems





- Milieu Protocol


Maintain good personal hygiene: every other day Encourage regular showers, 

every shift Remind patient to perform daily oral care, every shift Assist 

patient to perform ADL's


Maintain personal safety: every shift Educate patient to report safety concerns 

to staff, every shift Monitor environment for contraband/sharps


Medication safety: Monitor for expected outcome, potential side effects: every 

shift, Assess barriers to learning: every shift, Assess readiness for 

medication education: every shift


Milieu Narrative: 





Milieu/structure/supportive therapy


meds confirmed


OB&Gyn called, input appreciated 


Podiatry consult called, consult appreciated


Haldol 10mg amhs for psychosis ([place] [facility] [place]


PRN meds


benadryl as needed for insomnia


MVI (prenatal)


Folic acid


SW evaluation


pt submitted 48hr noticeX 2, but rescinded


Pt was [place] [facility] [place], was not found to be committable


Family involvement


will monitor closely


family meeting appreciated, awaiting for the legal documents indicating pt's 

mother is POA


Pt was educated about risk/benefits and alternatives of medications, coping 

strategies (safety plan, suicide prevention), relapse prevention, importance of 

follow up with psychiatrist and therapist, stay away from drugs/alcohol/smoking





medical [place] [facility] [place], appreciated, filed in chart


family meeting requested to discuss d/c plan





family meeting tomorrow











Family Contact


Family contact: Patient agrees to contact


Family contact name: Melva Springer(mother) [phone]


Family contacted how many times per week?: 2





Discharge/Continuing Care





- Education Needs


Education Needs: Patient Medication, Patient Diagnosis/Disease Process, Patient 

Coping Skills, Patient Placement options, Patient Community resources, Patient 

Activities of Daily Living, Patient Nutrition, Patient Uses of Medical Equipment

, Patient Personal Hygiene/Grooming, Patient Aftercare Safety Plan





- Discharge


Discharge Criteria: Tolerates medication w/o severe side effects, Free of 

paranoid thoughts, Free of agitation, Normal sleep pattern, Ability to care for 

self





- Treatment Team Participation


Patient/Family/SO Statement: 





Milieu/structure/supportive therapy


meds confirmed


OB&Gyn called, input appreciated 


Podiatry consult called, consult appreciated


Haldol 10mg amhs for psychosis (pt was [place] [facility] [place] 5[place] po tid)


PRN meds


benadryl as needed for insomnia


MVI (prenatal)


Folic acid


SW evaluation


pt submitted 48hr noticeX 2, but rescinded


Pt was [place] [facility] [place], was not found to be committable


Family involvement


will monitor closely


family meeting appreciated, awaiting for the legal documents indicating pt's 

mother is POA


Pt was educated about risk/benefits and alternatives of medications, coping 

strategies (safety plan, suicide prevention), relapse prevention, importance of 

follow up with psychiatrist and therapist, stay away from drugs/alcohol/smoking





medical [place] [facility] [place], appreciated, filed in chart


family meeting requested to discuss d/c plan





family meeting tomorrow











Treatment Plan Review





- Problem


  ** BIPOLAR  DISORDER


Time Initiated: 22:00





  ** Altered Thought Process


Time Initiated: 10:07





  ** Delusions


Time Initiated: 10:07





  ** Agitated Behavior


Time Initiated: 10:08





<Eda Calixto - Last Filed: 02/15/18 16:03>





- Diagnosis


(1) Schizoaffective disorder


Status: Acute   


Interventions: 





02/15/18 16:02


psychosis is improving slowly


pt is doing better, behavior is under control











(2) Phencyclidine-induced psychosis


Status: Acute   


Interventions: 





02/15/18 16:03


pt has poor insight into her PCP abuse h/o


pt still convinced that she did not use any drugs prior to this 

hospitalization.
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
## 2018-02-15 NOTE — PN
DATE:  02/14/2018



SUBJECTIVE:  Patient is seen ambulating in the hallway on 5 B.  Patient is

alert, awake, responsive.  Patient's speech appears to be pressured, not

repetitive.  Overnight nurse's notes were reviewed.  Patient's fetal heart

rate was monitored yesterday, which was 149.  Patient was seen ambulating

on the floor.  Patient's case was referred to  for discharge

planning.



PHYSICAL EXAMINATION:

VITAL SIGNS:  T-max 98.6; heart rate 97, 93; blood pressure 121/61, 140/81,

116/66; respiration 16 to 20; O2 sat 100%.

HEENT:  Head examination normocephalic, atraumatic.  HEENT examination

shows pink conjunctivae.  Anicteric sclerae.  No oropharyngeal lesion.  No

neck rigidity.

CHEST:  Kyphosis.

LUNGS:  Shows no rales, crackles or wheezing.

CARDIOVASCULAR:  S1, S2, regular rhythm.

ABDOMEN:  Obese, protuberant.  Positive bowel sound.  No hepatosplenomegaly

noted.  No guarding.  No rigidity.  No rebound tenderness.

GENITALIA:  Female.

RECTAL:  Examination deferred.

EXTREMITIES:  Shows chronic nonpitting, swelling of the lower extremity.

MUSCULOSKELETAL:  Shows a body mass index of 54.

NEUROLOGIC:  Patient is alert, awake, responsive, is able to follow

commands, ambulate independently.



DIAGNOSTICS:  None.



Patient was seen by psychiatrists.  Their recommendations were noted.



IMPRESSION AND PLAN:

1.  Hypertension.

2.  Tachycardia.

3.  Super morbid obesity with elevated body mass index of 54.

4.  Nicotine, alcohol and substance abuse disorder.

5.  Psychotic behavior and psychosis.

6.  Schizoaffective disorder.



Plan at this time, patient's case is referred to  for

discharge planning and family meeting.  Patient is for tentatively

discharged on 02/16/2018.



CURRENT MEDICATIONS:

1.  Benadryl 50 mg at bedtime p.r.n.

2.   Folic acid 1 mg daily.

3.  Haldol 5 mg IM q. 6 hours p.r.n.

4.  Haldol 10 mg a.m. and at bedtime.

5.  Lac-Hydrin lotion to both feet.

6.  Lotrimin cream to both feet twice a day.

7.  Nicotine patch 21 mg daily.

8.  Multivitamin 1 tablet daily.

9.  Xopenex nebulizer 0.63 mg q.12 hours.



At this time, patient upon discharge was advised to follow up in the office

for further continuation of the medical care.



Dictated and electronically signed, not read.





__________________________________________

George Davis MD





DD:  02/14/2018 20:17:57

DT:  02/14/2018 20:24:59

Job # [id number]

## 2018-02-16 RX ADMIN — AMMONIUM LACTATE SCH APPLIC: 12 CREAM TOPICAL at 08:28

## 2018-02-16 RX ADMIN — CLOTRIMAZOLE SCH APPLIC: 1 CREAM TOPICAL at 08:27

## 2018-02-16 RX ADMIN — LEVALBUTEROL SCH MG: 0.63 SOLUTION RESPIRATORY (INHALATION) at 08:21

## 2018-02-16 RX ADMIN — LEVALBUTEROL SCH MG: 0.63 SOLUTION RESPIRATORY (INHALATION) at 22:00

## 2018-02-16 RX ADMIN — THERA TABS SCH TAB: TAB at 09:20

## 2018-02-16 RX ADMIN — CLOTRIMAZOLE SCH APPLIC: 1 CREAM TOPICAL at 15:27

## 2018-02-16 NOTE — PN
DATE:  02/15/2018



SUBJECTIVE:  Patient is seen in room 517, bed 1.  Patient's overnight

nurse's notes were reviewed.  Patient is sitting up in the bed.  Patient

appears to be alert, awake, responsive; does not appear to be suicidal or

homicidal.  Denies any auditory or vision hallucination.



PHYSICAL EXAMINATION:

VITAL SIGNS:  T-max 98.2, heart rate 96 to 92.  Blood pressure 137/70,

116/66.  Respirations 20.

HEAD:  Normocephalic, atraumatic.

HEENT:  Shows pinkish conjunctivae.  Anicteric sclerae.  No oropharyngeal

lesion.  No neck rigidity.

CHEST:  Kyphosis.

LUNGS:  Shows no rales, crackles, or wheezing.

CARDIOVASCULAR:  S1, S2, regular rhythm.

ABDOMEN:  No audible murmur, gallop, or rub.

LUNGS:  Shows no rales, crackles, or wheezing.

CARDIOVASCULAR:  S1, S2, regular rhythm.

ABDOMEN:  No hepatosplenomegaly palpable.  No guarding, no rigidity, no

rebound tenderness.

GENITALIA:  Female.

RECTAL:  Deferred.

EXTREMITY:  Shows chronic nonpitting swelling of the lower extremity.

MUSCULOSKELETAL:  Shows a body mass index of 54.1.  Cranial nerves II

through XII limited.

GAIT EXAMINATION:  Independent.



DIAGNOSTICS STUDIES:  None.



IMPRESSION AND PLAN:

1.  Acute exacerbation of schizoaffective disorder.

2.  Anxiety disorder.

3.  Super morbid obesity with elevated body mass index of 54.

4.  Nicotine, alcohol, and polysubstance abuse and dependence and

phencyclidine abuse and dependence.

5.  Fourteen-week gestational state.

6.  Disorganized repetitive pressured speech.

7.  Questionable foot fungal infection versus tinea pedis.

8.  Phencyclidine-induced psychosis.

9.  History of bipolar disorder.

10.  History of substance abuse.

11.  Delusional disorder.

12.  Questionable behavioral disorder.



CURRENT MEDICATIONS:

1.  Benadryl 50 mg at bedtime p.r.n.

2.  Folic acid 1 mg daily.

3.  Haldol 5 mg IM q.6 hours p.r.n.

4.  Haldol 10 mg a.m. and bedtime.

5.  Haldol 5 mg at 4 p.m.

6.  Lac-Hydrin lotion to the feet daily.

7.  Lotrimin 1% cream to the feet twice a day.

8.  Nicotine patch 21 mg daily.

9.  Multivitamin one tablet daily.

10.  Xopenex nebulizer 0.63 mg every 12 hours.



Patient is seen by .  Patient seen by psychiatrist.  Patient's

inpatient hospitalization is extended till next week.



At present, according to the  and psychiatrist's evaluation,

patient is for tentative discharged on 02/19/2018.



Patient will be followed in the hospital while she is in-house.



Dictated and electronically signed, not read.





__________________________________________

George Davis MD



DD:  02/15/2018 22:53:41

DT:  02/15/2018 22:56:25

Job # [id number]

## 2018-02-16 NOTE — PN
DATE:



SUBJECTIVE:  The patient is seen ambulating on the floor.  The patient is

also seen in room 517, bed 1.  The patient today stated that her small

blister on the lower lip has started to crust and has burst open.  On

examination, the patient has dried blister and crusting of the lower lip.



PHYSICAL EXAMINATION:

VITAL SIGNS:  T-max 98.6; pulse 93-92; blood pressure 119/69, 137/70;

respirations 20.

HEENT:  Head examination normocephalic, atraumatic.  HEENT examination

shows pinkish conjunctivae.  Anicteric sclerae.  Dry blister with crusting

noted of the lower lip.  No neck rigidity.  No erythema of the lip noted. 

No jugular venous distention.  No audible carotid bruit.

CHEST:  Kyphosis.

LUNGS:  Shows no rales, crackles or wheezing.

CARDIOVASCULAR:  S1, S2.  Regular rhythm.

ABDOMEN:  Morbidly obese.  No hepatosplenomegaly palpated.  No guarding. 

No rigidity.  No rebound tenderness.  No costovertebral angle tenderness.

GENITALIA:  Female.

RECTAL:  Deferred.

EXTREMITY:  Shows chronic nonpitting swelling of the lower extremity.

MUSCULOSKELETAL:  Shows a body mass index of 54.



IMPRESSION AND PLAN:

1.  Lower lip crusted blister versus questionable herpes labialis.

2.  Super morbid obesity with elevated body mass index of 54.

3.  Acute exacerbation of schizoaffective disorder.

4.  Normocytic anemia.

5.  Hypokalemia.

6.  Trace proteinuria, ketonuria.

7.  Nicotine, alcohol and phencyclidine abuse and dependence.

8.  Poor personal hygiene.

9.  O+ blood type.

10.  History of bipolar disorder, history of substance abuse, history of

anxiety, history of questionable behavioral disorder with delusion.

11.  Psychosis.



PLAN:  At this time, the patient is going to be continued to be staying on

the Psychiatry floor.  The patient will be followed by Psychiatry and

Medicine Service.  The patient is for tentative discharge on 02/19/2018.



The patient will be continued on the medications as per the MAR, which is

reviewed.  The patient is on Benadryl 50 mg at bedtime p.r.n., folic acid 1

mg daily, Haldol 5 mg IM q. 6 hours p.r.n., Haldol 10 mg a.m. and at

bedtime, Haldol 5 mg 4:00 p.m., Lac-Hydrin lotion to the both feet daily,

Lotrimin cream to the affected feet twice a day, nicotine patch 21 mg

daily, multivitamin 1 tablet daily.  The patient was started on Valtrex 500

mg twice a day after verifying with the pharmacy and other resources if it

is safe to use in a pregnant female.  The patient is on Xopenex nebulizer

q. 12 hours 0.63 mg.  At present, in hospital formulary and pharmacy, we do

not have topical Zovirax.



Dictated and electronically signed, not read.





__________________________________________

George Davis MD





DD:  02/16/2018 10:42:04

DT:  02/16/2018 10:47:15

Job # [id number]

## 2018-02-16 NOTE — PCM.PYCHPN
Psychiatric Progress Note





- Psychiatric Progress Note


Patient seen today, length of contact: 30min


Patient Chief Complaint: 


" , I feel good on my meds now..."


Problems Identified/Issues Discussed: 


Suicide/ homicide prevention, past psychiatric h/o, current psychiatric symptoms

, medical problems, risk/benefits and alternatives of medications, medications 

compliance, coping strategies, substance abuse h/o, relapse prevention, 

importance of follow up with psychiatrist and therapist, discharge plan. 





Medical Problems: 


pt has obesity


foot fungal infection


pt is pregnant


see  note for more detailed information








Diagnostic Results: 














 18 07:00 





 18 07:00 





 Lab Results





18 08:15: Vitamin B12 575


18 07:00: Hemoglobin A1c 5.6


18 07:00: WBC 7.3, RBC 4.31, Hgb 11.6 L, Hct 35.1 L, MCV 81.4, MCH 26.9, 

MCHC 33.0, RDW 14.2, Plt Count 287, MPV 9.4, Gran % 59.2, Lymph % (Auto) 29.7, 

Mono % (Auto) 6.7 H, Eos % (Auto) 4.1, Baso % (Auto) 0.3, Gran # 4.34, Lymph # (

Auto) 2.2, Mono # (Auto) 0.5, Eos # (Auto) 0.3, Baso # (Auto) 0.02


18 07:00: RPR Nonreactive


18 07:00: TSH 3rd Generation 1.12, Beta HCG, Quant 86309.00 H


18 07:00: Sodium 137, Potassium 4.0, Chloride 102, Carbon Dioxide 25, 

Anion Gap 14, BUN 6 L, Creatinine 0.7, Est GFR (African Amer) > 60, Est GFR (Non

-Af Amer) > 60, Random Glucose 100, Fasting Glucose 100, Calcium 9.8, Magnesium 

2.0, Total Bilirubin 0.4, Direct Bilirubin 0.4, AST 29, ALT 29, Alkaline 

Phosphatase 71, Total Protein 7.6, Albumin 3.8, Globulin 3.8, Albumin/Globulin 

Ratio 1.0 L, Triglycerides 54, Cholesterol 175, LDL Cholesterol Direct 54, HDL 

Cholesterol 97 H, Folate > 20.0


18 11:12: Alcohol, Quantitative < 10


18 16:10: Blood Type Confirm O POSITIVE


18 16:10: Urine Color Yellow, Urine Appearance Sl cloudy, Urine pH 6.0, 

Ur Specific Gravity 1.025, Urine Protein Trace H, Urine Glucose (UA) Negative, 

Urine Ketones 15 H, Urine Blood Negative, Urine Nitrate Negative, Urine 

Bilirubin Negative, Urine Urobilinogen 0.2, Ur Leukocyte Esterase Negative, 

Urine RBC Negative, Urine WBC 0 - 2, Ur Epithelial Cells Many, Urine HCG, Qual 

Positive


18 16:10: Urine Opiates Screen Negative, Urine Methadone Screen Negative, 

Ur Barbiturates Screen Negative, Ur Phencyclidine Scrn Positive H, Ur 

Amphetamines Screen Negative, U Benzodiazepines Scrn Negative, U Oth Cocaine 

Metabols Negative, U Cannabinoids Screen Negative


18 15:15: Blood Type O POSITIVE, Antibody Screen Negative, BBK History 

Checked No verified bt


18 14:25: Sodium 140, Potassium 3.5 L, Chloride 106, Carbon Dioxide 25, 

Anion Gap 13, BUN 9, Creatinine 0.7, Est GFR (African Amer) > 60, Est GFR (Non-

Af Amer) > 60, Random Glucose 129 H, Calcium 9.5, Total Bilirubin 0.2, AST 26, 

ALT 24, Alkaline Phosphatase 102, Total Protein 7.2, Albumin 3.7, Globulin 3.5, 

Albumin/Globulin Ratio 1.1


18 14:25: WBC 7.6  D, RBC 4.21, Hgb 11.3 L D, Hct 33.9 L, MCV 80.5, MCH 

26.8, MCHC 33.3, RDW 14.2, Plt Count 277, MPV 9.2











Vital Signs











  Temp Pulse Resp BP Pulse Ox


 


 18 16:00   95 H   136/85 


 


 18 07:09  97.9 F  86  20  104/64 


 


 18 22:42  98.2 F  102 H  20  144/85 


 


 18 22:16    20  


 


 18 19:00  97.8 F  80  18  138/70  100


 


 18 17:07  98.9 F  80  16  147/83  100


 


 18 13:28  98.6 F  94 H  14  144/86  100


 


 18 06:00  98.5 F  79  16  137/69  100


 


 18 04:00  98.3 F  85  16  132/79  99


 


 18 02:00  98.6 F  76  17  124/68  100


 


 18 00:00  98.6 F  80  16  124/76  100


 


 18 22:00  98.3 F  85  16  137/69  99


 


 18 20:00  98.4 F  70  19  138/79  98


 


 18 18:00   91 H  18  124/74  99


 


 18 16:42   96 H  18  126/80  99


 


 18 15:10   100 H  18  125/86  99


 


 18 13:14  98.3 F  107 H  18  127/91 H  99














 Laboratory Results - last 24 hr











  18





  10:20


 


Total Creatine Kinase  150














 











Temp Pulse Resp BP Pulse Ox


 


 97.9 F   97 H  20   122/59 L  100 


 


 18 07:28  18 07:28  18 07:28  18 07:28  18 19:00











 











Temp Pulse Resp BP Pulse Ox


 


 98.2 F   100 H  16   119/77   100 


 


 18 07:09  18 07:09  18 07:09  18 07:09  18 19:00











 Vital Signs - 24 hr











  18





  07:24


 


Temperature 98.6 F


 


Pulse Rate 97 H


 


Respiratory 20





Rate 


 


Blood Pressure 140/81














 











Temp Pulse Resp BP Pulse Ox


 


 98.2 F   96 H  20   137/70   100 


 


 02/15/18 07:21  02/15/18 07:21  02/15/18 07:21  02/15/18 07:21  18 19:00








DSM 5 Symptoms Update: 





Shortly pt is 36yo AAF with long h/o mental illness, h/o multiple admissions in 

the psychiatric inpatient unit (mostly in [facility], recent admission to [facility] in 

2017), pt also has h/o substance use disorder, pt was admitted for 

evaluation and stabilization of disorganized, psychotic behavior, initially pt 

came for evaluation of abdominal pain, in ED pt presented to be disorganized, 

psychotic, this writer suggested [facility] screening, during awaiting for [facility] 

screening pt required to be medicated with Haldol 2mg PO, then pt was evaluated 

and pt was found to be not committable, pt was offered voluntary admission to 

[facility], pt agreed to sign in, then over night pt decided to leave the hospital and 

signed 48hr notice, then rescinded it, on the other day pt signed another 48hr 

notice, then rescinded it again. Currently pt is under voluntary status. 





family meeting took place 2/15/18:


patient, her mother (POA) Melva Sutton,  SW, this writer participated. 


see yesterday's note for more detailed information. 


pt decided to have an 


pt and mother both agreed with d/c planning 


IOP program


ANTHONY program


med management. 





pt's meds adjusted yesterday, haldol increased, pt said "doctor Eda, I feel 

much better on increased dose of medications..", pt's thought process is 

circumstantial and tangential but overall better organized, pt is not talking 

about "aliens under my bed" or recording songs with Jose Ramon ZHOU and Flory.





pt is about 16weeks of pregnancy, was using PCP, was absolutely disorganized, 

this writer continued Haldol what pt was prescribed at [facility] last month. 





as per staff pt is doing better, but labile, usually pt is pleasant, but at 

times could be demanding.





OB and Gyn team evaluated pt, consult, input appreciated. see notes.


Podiatry consult appreciated. 


medical consult appreciated.





pt was educated about risk/benefits and alternatives of meds.  pt tolerates 

meds well, no side effects observed or reported, AIMS 0, no EPS.





Impression:


Schizoaffective disorder


PCP abuse


Medication Change: Yes (haldol increased)


Medical Record Reviewed: Yes





Mental Status Examination





- Cognitive Function


Orientation: Person


Memory: Impaired


Attention: Poor (somewhat better)


Concentration: Poor (some improvement)


Association: Loose


Fund of Knowledge: Poor





- Mood


Mood: Depressed ("I am not depressed, I am very proud of myself")





- Affect


Affect: Broad, Other (labile)





- Speech


Speech: Appropriate (overproductive)





- Formal Thought Process


Formal Thought Process: Hallucinations (deneid), Delusions (grandiouse), 

Loosening of associations, Circumstantial, Perservation





- Suicidal Ideation


Suicidal Ideation: No





- Homicidal Ideation


Homicidal Ideation: No





Goal/Treatment Plan





- Goal/Treatment Plan


Need for Continued Stay: Remain at risks for inpatient hospitalization, Severe 

depression anxiety, Discharge may exacerbated symptoms, Failed transitioning, 

Severe functional impairment


Progress Toward Problem(s) and Goals/Treatment Plan: 





Milieu/structure/supportive therapy


meds confirmed


OB&Gyn called, input appreciated 


Podiatry consult called, consult appreciated


Haldol 10mg amhs 5mg at 4pm for psychosis (pt was d/c from [facility] on haldol 5mg 

po tid)


PRN meds


benadryl as needed for insomnia


MVI (prenatal)


Folic acid


SW evaluation


pt submitted 48hr noticeX 2, but rescinded


Pt was screened by [facility] in ED, was not found to be committable


Family involvement, family meeting took place twice 


will monitor closely


family meeting appreciated, awaiting for the legal documents indicating pt's 

mother is POA


Pt was educated about risk/benefits and alternatives of medications, coping 

strategies (safety plan, suicide prevention), relapse prevention, importance of 

follow up with psychiatrist and therapist, stay away from drugs/alcohol/smoking





medical record from [facility] requested, appreciated, filed in chart


family meeting 2/15/18 see above





Estimated Date of D/C: 18

## 2018-02-17 RX ADMIN — CLOTRIMAZOLE SCH APPLIC: 1 CREAM TOPICAL at 16:01

## 2018-02-17 RX ADMIN — LEVALBUTEROL SCH MG: 0.63 SOLUTION RESPIRATORY (INHALATION) at 22:50

## 2018-02-17 RX ADMIN — AMMONIUM LACTATE SCH APPLIC: 12 CREAM TOPICAL at 09:15

## 2018-02-17 RX ADMIN — THERA TABS SCH TAB: TAB at 09:16

## 2018-02-17 RX ADMIN — CLOTRIMAZOLE SCH APPLIC: 1 CREAM TOPICAL at 09:15

## 2018-02-17 NOTE — PCM.PYCHPN
Psychiatric Progress Note





- Psychiatric Progress Note


Patient seen today, length of contact: 25 min


Patient Chief Complaint: 


"I am better Dr. Chen"


Problems Identified/Issues Discussed: 


 I reviewed recent notes and met with patient at bedside. Patient is known to 

me from our interviews last week. She had presented as disorganized, emotional, 

impulsive, sexually preoccupied and unpredictable. Over the week it appears 

that her thought process has improved considerably and she can engage in a much 

more productive interview. She is well-oriented this morning. She remembers me 

and my name from last week's encounger. Her thought process is still a little 

tangential though focus is definitely improving and responses are much more 

relevant and consistent.  Emotions appear to be in better control and she is 

less labile. Patient doesn't appear to be hallucinating and she denies having 

any perceptual disturbance. Overt delusions were not elicited. 


Patient denies any new discomfort or pain. Tolerating medications and slept 

well last night. 


Staff notes also confirm her improvement over the week though she can still be 

labile and demanding. There were no reports of overt psychosis, recent or 

aggression/agitation on the unit. 








Diagnostic Results: 


Schizoaffective disorder


PCP abuse





Medication Change: Yes (haldol increased)


Medical Record Reviewed: Yes





Mental Status Examination





- Cognitive Function


Orientation: Person


Memory: Impaired


Attention: WNL (somewhat better)


Concentration: Poor (some improvement)


Association: Loose


Fund of Knowledge: Poor





- Mood


Mood: Depressed ( "I am better, Dr. Chen")





- Affect


Affect: Broad, Other (labile)





- Speech


Speech: Appropriate (overproductive)





- Formal Thought Process


Formal Thought Process: Hallucinations (denies), Delusions (none elicited this 

morning), Loosening of associations (Improving well), Circumstantial, 

Perservation (Improving well)





- Suicidal Ideation


Suicidal Ideation: No





- Homicidal Ideation


Homicidal Ideation: No





Goal/Treatment Plan





- Goal/Treatment Plan


Need for Continued Stay: Remain at risks for inpatient hospitalization, Severe 

depression anxiety, Discharge may exacerbated symptoms, Failed transitioning, 

Severe functional impairment


Progress Toward Problem(s) and Goals/Treatment Plan: 





* c/w current tx and plan


* c/w Haldol 10/5/10 and benadryl 50 mg HS. 


Due to pregnancy will continue to be conservative with medication changes 

unless patient demonstrates potentially dangerous behavior to herself or others 

(though patient's agreement to terminate pregnancy has been noted in Dr. Calixto's note) 


* No new weekend labs thus far


* Vitals reviewed and noted below: 


 Selected Entries











  02/17/18





  07:49


 


Temperature 98.3 F


 


Pulse Rate 84


 


Respiratory 20





Rate 


 


Blood Pressure 118/75








  


Estimated Date of D/C: 02/19/18

## 2018-02-18 RX ADMIN — AMMONIUM LACTATE SCH APPLIC: 12 CREAM TOPICAL at 08:46

## 2018-02-18 RX ADMIN — CLOTRIMAZOLE SCH APPLIC: 1 CREAM TOPICAL at 17:37

## 2018-02-18 RX ADMIN — THERA TABS SCH TAB: TAB at 08:44

## 2018-02-18 RX ADMIN — LEVALBUTEROL SCH MG: 0.63 SOLUTION RESPIRATORY (INHALATION) at 22:33

## 2018-02-18 RX ADMIN — CLOTRIMAZOLE SCH APPLIC: 1 CREAM TOPICAL at 08:47

## 2018-02-18 NOTE — PCM.PYCHPN
Psychiatric Progress Note





- Psychiatric Progress Note


Patient seen today, length of contact: 25 min


Patient Chief Complaint: 


"I am better Dr. Chen"


Problems Identified/Issues Discussed: 


I reviewed recent notes and met with patient at bedside. Last weekend she 

presented as disorganized, emotional, impulsive, sexually preoccupied and 

unpredictable. Over the week, her thought process has cleared considerably and 

she can now engage in a much more productive interview. 


Today patient remains well-oriented. Thought process is still a little 

tangential though focus is definitely improving and responses are much more 

relevant and consistent.  Emotions appear to be in better control and she is 

less labile. Patient doesn't appear to be hallucinating and she denies having 

any perceptual disturbance. Overt delusions were not elicited. 


Patient denies any new discomfort or pain. Tolerating medications and slept 

well again last night. 


Staff notes also confirm her improvement over the week though she still can be 

labile. Refused group discussion yesterday even with encouragement. There were 

no reports of overt psychosis, recent or aggression/agitation on the unit. 

Behavior is much better controlled. 





  


Diagnostic Results: 


Schizoaffective disorder


PCP abuse





Medication Change: Yes (haldol increased)


Medical Record Reviewed: Yes





Mental Status Examination





- Cognitive Function


Orientation: Person


Memory: Impaired


Attention: WNL (somewhat better)


Concentration: Poor (some improvement)


Association: Loose


Fund of Knowledge: Poor





- Mood


Mood: Depressed ( "I am better, Dr. Chen")





- Affect


Affect: Broad, Other (labile)





- Speech


Speech: Appropriate (overproductive)





- Formal Thought Process


Formal Thought Process: Hallucinations (denied all weekend), Delusions (none 

elicited this weekend), Loosening of associations (Improving well), 

Circumstantial, Perservation (Improving well)





- Suicidal Ideation


Suicidal Ideation: No





- Homicidal Ideation


Homicidal Ideation: No





Goal/Treatment Plan





- Goal/Treatment Plan


Need for Continued Stay: Remain at risks for inpatient hospitalization, Severe 

depression anxiety, Discharge may exacerbated symptoms, Failed transitioning, 

Severe functional impairment


Progress Toward Problem(s) and Goals/Treatment Plan: 





* c/w current tx and plan


* c/w Haldol 10/5/10 and benadryl 50 mg HS. 


Due to pregnancy will continue to be conservative with medication changes 

unless patient demonstrates potentially dangerous behavior to herself or others 

(though patient's agreement to terminate pregnancy has been noted in Dr. Calixto's note) 


* No new weekend labs  


* Vitals reviewed and noted below:


 Selected Entries











  02/18/18





  07:24


 


Temperature 98.3 F


 


Pulse Rate 80


 


Respiratory 20





Rate 


 


Blood Pressure 109/67





 


  


Estimated Date of D/C: 02/19/18

## 2018-02-19 VITALS
SYSTOLIC BLOOD PRESSURE: 112 MMHG | HEART RATE: 88 BPM | DIASTOLIC BLOOD PRESSURE: 58 MMHG | RESPIRATION RATE: 17 BRPM | TEMPERATURE: 98.2 F

## 2018-02-19 RX ADMIN — AMMONIUM LACTATE SCH APPLIC: 12 CREAM TOPICAL at 09:35

## 2018-02-19 RX ADMIN — LEVALBUTEROL SCH MG: 0.63 SOLUTION RESPIRATORY (INHALATION) at 08:45

## 2018-02-19 RX ADMIN — THERA TABS SCH TAB: TAB at 09:29

## 2018-02-19 RX ADMIN — CLOTRIMAZOLE SCH APPLIC: 1 CREAM TOPICAL at 09:32

## 2018-02-20 NOTE — PN
DATE:  02/19/2018



SUBJECTIVE:  The patient was seen in the room 517, bed 2.  The patient is

sitting up in the bed and lying in the bed.  The patient is alert, awake,

responsive.  Does not appear to be in any distress.  Overnight nurse's

notes were reviewed.  The patient slept without any adverse event.  The

patient was cooperative with medication.  The patient was found to be

cooperative.  The patient denied any auditory or visual hallucination.  The

patient is today to be discharged.  The patient is to be discharged home. 

The patient was given referral for OB/GYN for termination of pregnancy,

which the patient is interested in.  The patient is to be discharged home

to her boyfriend with all prescriptions.



PHYSICAL EXAMINATION:

VITAL SIGNS:  T-max 98.2, pulse 88, blood pressure 121/85, respirations 17.

The patient is seen in room 517.

HEENT:  Head examination normocephalic, atraumatic.  HEENT examination

shows pinkish pale conjunctivae.  Anicteric sclerae.  No oropharyngeal

lesion.  No neck rigidity.

CHEST:  Symmetrical.

LUNGS:  Shows no rales, crackles or wheezing.

CARDIOVASCULAR:  S1, S2.  Regular rhythm.  Questionable soft systolic

murmur, left sternal border, left second intercostal space, right second

intercostal space.

ABDOMEN:  Morbidly obese.

GENITALIA:  Female.

RECTAL:  Deferred.

EXTREMITY:  Shows chronic nonpitting swelling of the lower extremity.  No

calf tenderness, no Homans' sign.  MUSCULOSKELETAL:  Shows a body mass

index of 55.  Gait examination is independent.



DIAGNOSTICS:  None.



IMPRESSION AND PLAN:

1.  Resolved questionable lower lip herpes labialis.

2.  History of hypertension.  History of nicotine, alcohol and

polysubstance abuse dependent.

3.  Super morbid obesity with elevated body mass index of greater than 55.

4.  Normocytic anemia.

5.  Morbid obesity.

6.  Tangential thought process.

7.  Transient tachycardia.

8.  Normocytic anemia.

9.  Hypokalemia.

10.  Gestational state with elevated beta SCG level of greater than 30,000.

11.  Trace proteinuria.

12.  Urine drug screen positive for PCP.

13.  Tenia pedis.



Plan at this time, the patient is to be discharge and cleared by

Psychiatry.  The patient was advised close followup with the psychiatrist

and OB/GYN.  The patient's today's medications are Benadryl 50 mg at

bedtime p.r.n., folic acid 1 mg daily, Haldol 5 mg IM q. 6 hours p.r.n.,

Haldol 10 mg a.m. and at bedtime, Haldol 5 mg at 04:00 p.m., Lac-Hydrin

lotion to the feet, Lotrimin cream to the feet twice a day, nicotine patch

21 mg daily, multivitamin 1 tablet daily.  The patient is to finish today's

dose of Valtrex 500 twice a day.  The patient is discharged home after

cleared by Psychiatry.  The patient's discharge medications:  Lac-Hydrin

lotion to both feet 12% daily, Lotrimin cream to both feet daily, folic

acid 1 mg daily, Haldol 10 mg a.m. and at bedtime, Haldol 5 mg at 04:00

p.m., Xopenex nebulizer 0.63 mg twice a day, multivitamin 1 tablet daily,

nicotine patch 21 mg daily.  The patient was given Valtrex 500 twice a day

by Dr. Calixto.  The patient was discharged home.  The patient was

advised to follow up in the office if she wishes to, otherwise the patient

can continue with her regular physician followup.



Dictated and electronically signed, not read.





__________________________________________

Georeg Davis MD

 



DD:  02/19/2018 20:53:10

DT:  02/19/2018 20:57:56

Job # [id number]
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
## 2018-09-15 ENCOUNTER — HOSPITAL ENCOUNTER (EMERGENCY)
Dept: HOSPITAL 42 - ED | Age: 38
Discharge: HOME | End: 2018-09-15
Payer: COMMERCIAL

## 2018-09-15 VITALS
TEMPERATURE: 98.8 F | RESPIRATION RATE: 18 BRPM | SYSTOLIC BLOOD PRESSURE: 137 MMHG | HEART RATE: 98 BPM | DIASTOLIC BLOOD PRESSURE: 78 MMHG | OXYGEN SATURATION: 98 %

## 2018-09-15 VITALS — BODY MASS INDEX: 57.5 KG/M2

## 2018-09-15 DIAGNOSIS — Z00.8: Primary | ICD-10-CM
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
## 2018-09-17 ENCOUNTER — HOSPITAL ENCOUNTER (EMERGENCY)
Dept: HOSPITAL 42 - ED | Age: 38
Discharge: HOME | End: 2018-09-17
Payer: COMMERCIAL

## 2018-09-17 VITALS
TEMPERATURE: 98.2 F | RESPIRATION RATE: 16 BRPM | HEART RATE: 97 BPM | SYSTOLIC BLOOD PRESSURE: 150 MMHG | OXYGEN SATURATION: 99 % | DIASTOLIC BLOOD PRESSURE: 87 MMHG

## 2018-09-17 VITALS — BODY MASS INDEX: 61.2 KG/M2

## 2018-09-17 DIAGNOSIS — E11.9: ICD-10-CM

## 2018-09-17 DIAGNOSIS — Z87.891: ICD-10-CM

## 2018-09-17 DIAGNOSIS — I10: ICD-10-CM

## 2018-09-17 DIAGNOSIS — F20.9: Primary | ICD-10-CM

## 2018-09-17 DIAGNOSIS — F31.9: ICD-10-CM

## 2018-09-17 LAB
ALBUMIN SERPL-MCNC: 3.8 G/DL (ref 3–4.8)
ALBUMIN/GLOB SERPL: 1.2 {RATIO} (ref 1.1–1.8)
ALT SERPL-CCNC: 23 U/L (ref 7–56)
APAP SERPL-MCNC: < 10 UG/ML (ref 10–20)
APPEARANCE UR: (no result)
AST SERPL-CCNC: 18 U/L (ref 14–36)
BACTERIA #/AREA URNS HPF: (no result) /[HPF]
BASOPHILS # BLD AUTO: 0.02 K/MM3 (ref 0–2)
BASOPHILS NFR BLD: 0.2 % (ref 0–3)
BILIRUB UR-MCNC: NEGATIVE MG/DL
BUN SERPL-MCNC: 13 MG/DL (ref 7–21)
CALCIUM SERPL-MCNC: 9 MG/DL (ref 8.4–10.5)
COLOR UR: YELLOW
EOSINOPHIL # BLD: 0.2 10*3/UL (ref 0–0.7)
EOSINOPHIL NFR BLD: 2.5 % (ref 1.5–5)
ERYTHROCYTE [DISTWIDTH] IN BLOOD BY AUTOMATED COUNT: 16 % (ref 11.5–14.5)
GFR NON-AFRICAN AMERICAN: > 60
GLUCOSE UR STRIP-MCNC: NEGATIVE MG/DL
GRANULOCYTES # BLD: 4.78 10*3/UL (ref 1.4–6.5)
GRANULOCYTES NFR BLD: 55.9 % (ref 50–68)
HGB BLD-MCNC: 12.8 G/DL (ref 12–16)
LEUKOCYTE ESTERASE UR-ACNC: (no result) LEU/UL
LYMPHOCYTES # BLD: 2.9 10*3/UL (ref 1.2–3.4)
LYMPHOCYTES NFR BLD AUTO: 34.3 % (ref 22–35)
MCH RBC QN AUTO: 26.5 PG (ref 25–35)
MCHC RBC AUTO-ENTMCNC: 32.8 G/DL (ref 31–37)
MCV RBC AUTO: 80.7 FL (ref 80–105)
MONOCYTES # BLD AUTO: 0.6 10*3/UL (ref 0.1–0.6)
MONOCYTES NFR BLD: 7.1 % (ref 1–6)
PH UR STRIP: 6 [PH] (ref 4.7–8)
PLATELET # BLD: 313 10^3/UL (ref 120–450)
PMV BLD AUTO: 9.4 FL (ref 7–11)
PROT UR STRIP-MCNC: NEGATIVE MG/DL
RBC # BLD AUTO: 4.83 10^6/UL (ref 3.5–6.1)
RBC # UR STRIP: (no result) /UL
RBC #/AREA URNS HPF: (no result) /HPF (ref 0–2)
SALICYLATES SERPL-MCNC: < 1 MG/DL (ref 2–20)
SP GR UR STRIP: <= 1.005 (ref 1–1.03)
UROBILINOGEN UR STRIP-ACNC: 0.2 E.U./DL
WBC # BLD AUTO: 8.6 10^3/UL (ref 4.5–11)

## 2018-09-17 NOTE — RAD
Date of service: 



09/17/2018



HISTORY:

pes eval  



COMPARISON:

10/07/2017. 



FINDINGS:



LUNGS:

The lungs are well inflated.  There is mild pulmonary venous 

congestion.



PLEURA:

No significant pleural effusion identified, no pneumothorax apparent.



CARDIOVASCULAR:

There is mild cardiomegaly and prominent central vasculature.



OSSEOUS STRUCTURES:

No significant abnormalities.



VISUALIZED UPPER ABDOMEN:

Normal.



OTHER FINDINGS:

None.



IMPRESSION:

No active pulmonary disease.
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
## 2018-09-18 NOTE — CARD
--------------- APPROVED REPORT --------------





Date of service: 09/17/2018



EKG Measurement

Heart [id number]

MI [id number]

[id number]

[id number]

[id number]



<Conclusion>

Normal sinus rhythm

Normal ECG

## 2019-04-20 ENCOUNTER — HOSPITAL ENCOUNTER (EMERGENCY)
Dept: HOSPITAL 42 - ED | Age: 39
Discharge: HOME | End: 2019-04-20
Payer: COMMERCIAL

## 2019-04-20 VITALS
RESPIRATION RATE: 18 BRPM | TEMPERATURE: 97.5 F | OXYGEN SATURATION: 97 % | SYSTOLIC BLOOD PRESSURE: 137 MMHG | HEART RATE: 90 BPM | DIASTOLIC BLOOD PRESSURE: 90 MMHG

## 2019-04-20 VITALS — BODY MASS INDEX: 60.7 KG/M2

## 2019-04-20 DIAGNOSIS — F17.210: ICD-10-CM

## 2019-04-20 DIAGNOSIS — E11.9: ICD-10-CM

## 2019-04-20 DIAGNOSIS — R52: Primary | ICD-10-CM

## 2019-04-20 DIAGNOSIS — F20.9: ICD-10-CM

## 2019-04-20 DIAGNOSIS — I10: ICD-10-CM

## 2019-04-20 DIAGNOSIS — F31.9: ICD-10-CM
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
